# Patient Record
Sex: FEMALE | Race: WHITE | Employment: FULL TIME | URBAN - NONMETROPOLITAN AREA
[De-identification: names, ages, dates, MRNs, and addresses within clinical notes are randomized per-mention and may not be internally consistent; named-entity substitution may affect disease eponyms.]

---

## 2017-01-20 PROBLEM — O41.00X0 OLIGOHYDRAMNIOS: Status: ACTIVE | Noted: 2017-01-20

## 2018-05-17 ENCOUNTER — NURSE TRIAGE (OUTPATIENT)
Dept: ADMINISTRATIVE | Age: 23
End: 2018-05-17

## 2018-05-19 ENCOUNTER — HOSPITAL ENCOUNTER (EMERGENCY)
Age: 23
Discharge: HOME | End: 2018-05-19
Attending: EMERGENCY MEDICINE
Payer: COMMERCIAL

## 2018-05-19 VITALS
OXYGEN SATURATION: 96 % | RESPIRATION RATE: 21 BRPM | HEIGHT: 61 IN | BODY MASS INDEX: 28.51 KG/M2 | TEMPERATURE: 99.1 F | DIASTOLIC BLOOD PRESSURE: 53 MMHG | HEART RATE: 114 BPM | SYSTOLIC BLOOD PRESSURE: 93 MMHG | WEIGHT: 151 LBS

## 2018-05-19 DIAGNOSIS — R65.10 SIRS (SYSTEMIC INFLAMMATORY RESPONSE SYNDROME) (HCC): ICD-10-CM

## 2018-05-19 DIAGNOSIS — J02.9 ACUTE PHARYNGITIS, UNSPECIFIED ETIOLOGY: Primary | ICD-10-CM

## 2018-05-19 LAB
ANION GAP SERPL CALCULATED.3IONS-SCNC: 13 MEQ/L (ref 8–16)
BACTERIA: ABNORMAL /HPF
BASOPHILS # BLD: 0.1 %
BASOPHILS ABSOLUTE: 0 THOU/MM3 (ref 0–0.1)
BILIRUBIN URINE: NEGATIVE
BLOOD, URINE: ABNORMAL
BUN BLDV-MCNC: 12 MG/DL (ref 7–22)
CALCIUM SERPL-MCNC: 8.4 MG/DL (ref 8.5–10.5)
CASTS 2: ABNORMAL /LPF
CASTS UA: ABNORMAL /LPF
CHARACTER, URINE: CLEAR
CHLORIDE BLD-SCNC: 98 MEQ/L (ref 98–111)
CO2: 24 MEQ/L (ref 23–33)
COLOR: YELLOW
CREAT SERPL-MCNC: 0.9 MG/DL (ref 0.4–1.2)
CRYSTALS, UA: ABNORMAL
EOSINOPHIL # BLD: 0 %
EOSINOPHILS ABSOLUTE: 0 THOU/MM3 (ref 0–0.4)
EPITHELIAL CELLS, UA: ABNORMAL /HPF
FLU A ANTIGEN: NEGATIVE
FLU B ANTIGEN: NEGATIVE
GFR SERPL CREATININE-BSD FRML MDRD: 79 ML/MIN/1.73M2
GLUCOSE BLD-MCNC: 105 MG/DL (ref 70–108)
GLUCOSE URINE: NEGATIVE MG/DL
GROUP A STREP CULTURE, REFLEX: NEGATIVE
HCT VFR BLD CALC: 38.5 % (ref 37–47)
HEMOGLOBIN: 13.4 GM/DL (ref 12–16)
HETEROPHILE ANTIBODIES: NEGATIVE
KETONES, URINE: NEGATIVE
LEUKOCYTE ESTERASE, URINE: NEGATIVE
LYMPHOCYTES # BLD: 17.1 %
LYMPHOCYTES ABSOLUTE: 1.8 THOU/MM3 (ref 1–4.8)
MCH RBC QN AUTO: 30.9 PG (ref 27–31)
MCHC RBC AUTO-ENTMCNC: 34.8 GM/DL (ref 33–37)
MCV RBC AUTO: 88.7 FL (ref 81–99)
MISCELLANEOUS 2: ABNORMAL
MONOCYTES # BLD: 8.8 %
MONOCYTES ABSOLUTE: 0.9 THOU/MM3 (ref 0.4–1.3)
NITRITE, URINE: NEGATIVE
NUCLEATED RED BLOOD CELLS: 0 /100 WBC
OSMOLALITY CALCULATION: 270.2 MOSMOL/KG (ref 275–300)
PDW BLD-RTO: 13.1 % (ref 11.5–14.5)
PH UA: 5
PLATELET # BLD: 213 THOU/MM3 (ref 130–400)
PMV BLD AUTO: 8.6 FL (ref 7.4–10.4)
POTASSIUM SERPL-SCNC: 3.6 MEQ/L (ref 3.5–5.2)
PREGNANCY, SERUM: NEGATIVE
PROTEIN UA: NEGATIVE
RBC # BLD: 4.33 MILL/MM3 (ref 4.2–5.4)
RBC URINE: ABNORMAL /HPF
REFLEX THROAT C + S: NORMAL
RENAL EPITHELIAL, UA: ABNORMAL
SEG NEUTROPHILS: 74 %
SEGMENTED NEUTROPHILS ABSOLUTE COUNT: 7.9 THOU/MM3 (ref 1.8–7.7)
SODIUM BLD-SCNC: 135 MEQ/L (ref 135–145)
SPECIFIC GRAVITY, URINE: 1.02 (ref 1–1.03)
UROBILINOGEN, URINE: 0.2 EU/DL
WBC # BLD: 10.7 THOU/MM3 (ref 4.8–10.8)
WBC UA: ABNORMAL /HPF
YEAST: ABNORMAL

## 2018-05-19 PROCEDURE — 96365 THER/PROPH/DIAG IV INF INIT: CPT

## 2018-05-19 PROCEDURE — 36415 COLL VENOUS BLD VENIPUNCTURE: CPT

## 2018-05-19 PROCEDURE — 87070 CULTURE OTHR SPECIMN AEROBIC: CPT

## 2018-05-19 PROCEDURE — 86308 HETEROPHILE ANTIBODY SCREEN: CPT

## 2018-05-19 PROCEDURE — 87880 STREP A ASSAY W/OPTIC: CPT

## 2018-05-19 PROCEDURE — 85025 COMPLETE CBC W/AUTO DIFF WBC: CPT

## 2018-05-19 PROCEDURE — 2580000003 HC RX 258: Performed by: EMERGENCY MEDICINE

## 2018-05-19 PROCEDURE — 99283 EMERGENCY DEPT VISIT LOW MDM: CPT

## 2018-05-19 PROCEDURE — 87040 BLOOD CULTURE FOR BACTERIA: CPT

## 2018-05-19 PROCEDURE — 84703 CHORIONIC GONADOTROPIN ASSAY: CPT

## 2018-05-19 PROCEDURE — 81001 URINALYSIS AUTO W/SCOPE: CPT

## 2018-05-19 PROCEDURE — 80048 BASIC METABOLIC PNL TOTAL CA: CPT

## 2018-05-19 PROCEDURE — 87804 INFLUENZA ASSAY W/OPTIC: CPT

## 2018-05-19 PROCEDURE — 6360000002 HC RX W HCPCS: Performed by: EMERGENCY MEDICINE

## 2018-05-19 PROCEDURE — 6370000000 HC RX 637 (ALT 250 FOR IP): Performed by: EMERGENCY MEDICINE

## 2018-05-19 RX ORDER — 0.9 % SODIUM CHLORIDE 0.9 %
1000 INTRAVENOUS SOLUTION INTRAVENOUS ONCE
Status: COMPLETED | OUTPATIENT
Start: 2018-05-19 | End: 2018-05-19

## 2018-05-19 RX ORDER — ACETAMINOPHEN 500 MG
1000 TABLET ORAL ONCE
Status: COMPLETED | OUTPATIENT
Start: 2018-05-19 | End: 2018-05-19

## 2018-05-19 RX ORDER — 0.9 % SODIUM CHLORIDE 0.9 %
500 INTRAVENOUS SOLUTION INTRAVENOUS ONCE
Status: DISCONTINUED | OUTPATIENT
Start: 2018-05-19 | End: 2018-05-19 | Stop reason: HOSPADM

## 2018-05-19 RX ORDER — IBUPROFEN 800 MG/1
800 TABLET ORAL ONCE
Status: COMPLETED | OUTPATIENT
Start: 2018-05-19 | End: 2018-05-19

## 2018-05-19 RX ADMIN — ACETAMINOPHEN 1000 MG: 500 TABLET ORAL at 04:24

## 2018-05-19 RX ADMIN — IBUPROFEN 800 MG: 800 TABLET ORAL at 05:24

## 2018-05-19 RX ADMIN — SODIUM CHLORIDE 1000 ML: 9 INJECTION, SOLUTION INTRAVENOUS at 04:24

## 2018-05-19 RX ADMIN — CEFTRIAXONE SODIUM 1 G: 1 INJECTION, POWDER, FOR SOLUTION INTRAMUSCULAR; INTRAVENOUS at 04:24

## 2018-05-19 ASSESSMENT — ENCOUNTER SYMPTOMS
SHORTNESS OF BREATH: 0
RHINORRHEA: 0
EYE PAIN: 0
ABDOMINAL PAIN: 0
NAUSEA: 0
WHEEZING: 0
DIARRHEA: 0
EYE DISCHARGE: 0
BACK PAIN: 0
COUGH: 1
VOMITING: 0
SORE THROAT: 1

## 2018-05-19 ASSESSMENT — PAIN DESCRIPTION - LOCATION
LOCATION: THROAT
LOCATION: THROAT

## 2018-05-19 ASSESSMENT — PAIN SCALES - GENERAL
PAINLEVEL_OUTOF10: 9
PAINLEVEL_OUTOF10: 9

## 2018-05-19 ASSESSMENT — PAIN DESCRIPTION - PAIN TYPE
TYPE: ACUTE PAIN
TYPE: ACUTE PAIN

## 2018-05-19 ASSESSMENT — PAIN DESCRIPTION - FREQUENCY
FREQUENCY: CONTINUOUS
FREQUENCY: CONTINUOUS

## 2018-05-19 ASSESSMENT — PAIN DESCRIPTION - DESCRIPTORS: DESCRIPTORS: BURNING

## 2018-05-21 LAB — THROAT/NOSE CULTURE: NORMAL

## 2018-05-24 LAB
BLOOD CULTURE, ROUTINE: NORMAL
BLOOD CULTURE, ROUTINE: NORMAL

## 2018-08-21 ENCOUNTER — HOSPITAL ENCOUNTER (EMERGENCY)
Age: 23
Discharge: HOME | End: 2018-08-21
Attending: EMERGENCY MEDICINE
Payer: COMMERCIAL

## 2018-08-21 ENCOUNTER — APPOINTMENT (OUTPATIENT)
Dept: CT IMAGING | Age: 23
End: 2018-08-21
Payer: COMMERCIAL

## 2018-08-21 VITALS
BODY MASS INDEX: 28.51 KG/M2 | OXYGEN SATURATION: 97 % | TEMPERATURE: 99.2 F | RESPIRATION RATE: 18 BRPM | SYSTOLIC BLOOD PRESSURE: 107 MMHG | HEART RATE: 80 BPM | WEIGHT: 151 LBS | DIASTOLIC BLOOD PRESSURE: 71 MMHG | HEIGHT: 61 IN

## 2018-08-21 DIAGNOSIS — N30.01 ACUTE CYSTITIS WITH HEMATURIA: Primary | ICD-10-CM

## 2018-08-21 LAB
ANION GAP SERPL CALCULATED.3IONS-SCNC: 12 MEQ/L (ref 8–16)
BACTERIA: ABNORMAL /HPF
BASOPHILS # BLD: 0.4 %
BASOPHILS ABSOLUTE: 0 THOU/MM3 (ref 0–0.1)
BILIRUBIN URINE: NEGATIVE
BLOOD, URINE: ABNORMAL
BUN BLDV-MCNC: 12 MG/DL (ref 7–22)
CALCIUM SERPL-MCNC: 9.3 MG/DL (ref 8.5–10.5)
CASTS 2: ABNORMAL /LPF
CASTS UA: ABNORMAL /LPF
CHARACTER, URINE: ABNORMAL
CHLORIDE BLD-SCNC: 102 MEQ/L (ref 98–111)
CO2: 24 MEQ/L (ref 23–33)
COLOR: YELLOW
CREAT SERPL-MCNC: 0.8 MG/DL (ref 0.4–1.2)
CRYSTALS, UA: ABNORMAL
EOSINOPHIL # BLD: 0.9 %
EOSINOPHILS ABSOLUTE: 0.1 THOU/MM3 (ref 0–0.4)
EPITHELIAL CELLS, UA: ABNORMAL /HPF
ERYTHROCYTE [DISTWIDTH] IN BLOOD BY AUTOMATED COUNT: 13.2 % (ref 11.5–14.5)
ERYTHROCYTE [DISTWIDTH] IN BLOOD BY AUTOMATED COUNT: 43 FL (ref 35–45)
GFR SERPL CREATININE-BSD FRML MDRD: > 90 ML/MIN/1.73M2
GLUCOSE BLD-MCNC: 95 MG/DL (ref 70–108)
GLUCOSE URINE: NEGATIVE MG/DL
HCT VFR BLD CALC: 41.6 % (ref 37–47)
HEMOGLOBIN: 14.4 GM/DL (ref 12–16)
IMMATURE GRANS (ABS): 0.06 THOU/MM3 (ref 0–0.07)
IMMATURE GRANULOCYTES: 0.5 %
KETONES, URINE: NEGATIVE
LEUKOCYTE ESTERASE, URINE: ABNORMAL
LYMPHOCYTES # BLD: 25.7 %
LYMPHOCYTES ABSOLUTE: 2.9 THOU/MM3 (ref 1–4.8)
MCH RBC QN AUTO: 31.2 PG (ref 26–33)
MCHC RBC AUTO-ENTMCNC: 34.6 GM/DL (ref 32.2–35.5)
MCV RBC AUTO: 90 FL (ref 81–99)
MISCELLANEOUS 2: ABNORMAL
MONOCYTES # BLD: 6.2 %
MONOCYTES ABSOLUTE: 0.7 THOU/MM3 (ref 0.4–1.3)
NITRITE, URINE: POSITIVE
NUCLEATED RED BLOOD CELLS: 0 /100 WBC
OSMOLALITY CALCULATION: 275.2 MOSMOL/KG (ref 275–300)
PH UA: 5.5
PLATELET # BLD: 278 THOU/MM3 (ref 130–400)
PMV BLD AUTO: 9.6 FL (ref 9.4–12.4)
POTASSIUM SERPL-SCNC: 3.9 MEQ/L (ref 3.5–5.2)
PREGNANCY, SERUM: NEGATIVE
PREGNANCY, URINE: NEGATIVE
PROTEIN UA: 30
RBC # BLD: 4.62 MILL/MM3 (ref 4.2–5.4)
RBC URINE: ABNORMAL /HPF
RENAL EPITHELIAL, UA: ABNORMAL
SEG NEUTROPHILS: 66.3 %
SEGMENTED NEUTROPHILS ABSOLUTE COUNT: 7.6 THOU/MM3 (ref 1.8–7.7)
SODIUM BLD-SCNC: 138 MEQ/L (ref 135–145)
SPECIFIC GRAVITY, URINE: 1.02 (ref 1–1.03)
UROBILINOGEN, URINE: 0.2 EU/DL
WBC # BLD: 11.4 THOU/MM3 (ref 4.8–10.8)
WBC UA: ABNORMAL /HPF
YEAST: ABNORMAL

## 2018-08-21 PROCEDURE — 81001 URINALYSIS AUTO W/SCOPE: CPT

## 2018-08-21 PROCEDURE — 87186 SC STD MICRODIL/AGAR DIL: CPT

## 2018-08-21 PROCEDURE — 87184 SC STD DISK METHOD PER PLATE: CPT

## 2018-08-21 PROCEDURE — 74176 CT ABD & PELVIS W/O CONTRAST: CPT

## 2018-08-21 PROCEDURE — 84703 CHORIONIC GONADOTROPIN ASSAY: CPT

## 2018-08-21 PROCEDURE — 36415 COLL VENOUS BLD VENIPUNCTURE: CPT

## 2018-08-21 PROCEDURE — 99284 EMERGENCY DEPT VISIT MOD MDM: CPT

## 2018-08-21 PROCEDURE — 85025 COMPLETE CBC W/AUTO DIFF WBC: CPT

## 2018-08-21 PROCEDURE — 87086 URINE CULTURE/COLONY COUNT: CPT

## 2018-08-21 PROCEDURE — 87077 CULTURE AEROBIC IDENTIFY: CPT

## 2018-08-21 PROCEDURE — 81025 URINE PREGNANCY TEST: CPT

## 2018-08-21 PROCEDURE — 80048 BASIC METABOLIC PNL TOTAL CA: CPT

## 2018-08-21 RX ORDER — SULFAMETHOXAZOLE AND TRIMETHOPRIM 800; 160 MG/1; MG/1
1 TABLET ORAL 2 TIMES DAILY
Qty: 6 TABLET | Refills: 0 | Status: SHIPPED | OUTPATIENT
Start: 2018-08-21 | End: 2018-08-24

## 2018-08-21 ASSESSMENT — ENCOUNTER SYMPTOMS
BACK PAIN: 0
NAUSEA: 0
COUGH: 0
WHEEZING: 0
ABDOMINAL PAIN: 0
RHINORRHEA: 0
VOMITING: 0
EYE DISCHARGE: 0
SORE THROAT: 0
DIARRHEA: 0
SHORTNESS OF BREATH: 0
EYE PAIN: 0

## 2018-08-21 ASSESSMENT — PAIN DESCRIPTION - ORIENTATION: ORIENTATION: RIGHT;LOWER

## 2018-08-21 ASSESSMENT — PAIN DESCRIPTION - PAIN TYPE: TYPE: ACUTE PAIN

## 2018-08-21 ASSESSMENT — PAIN DESCRIPTION - LOCATION: LOCATION: FLANK;BACK

## 2018-08-21 ASSESSMENT — PAIN SCALES - GENERAL: PAINLEVEL_OUTOF10: 8

## 2018-08-22 NOTE — ED PROVIDER NOTES
[facility]  eMERGENCY dEPARTMENT eNCOUnter          [address] [facility]       Chief Complaint   Patient presents with    Back Pain    Flank Pain       Nurses Notes reviewed and I agree except as noted in the HPI. HISTORY OF PRESENT Astrid Hope is a 26 y.o. female who presents to the Emergency Department with a medical history of nephrolithiasis for the evalution for the evaluation of right sided flank pain. She states that her pain became present at 1000 this morning and reports that it became increasingly exacerbated throughout the day. She rates her current pain as a 8/10 in severity. She states that her pain tracks towards the right side of her abdomen. No additional symptoms or complaints at this time. The HPI was provided by the patient. REVIEW OF SYSTEMS     Review of Systems   Constitutional: Negative for appetite change, chills, fatigue and fever. HENT: Negative for congestion, ear pain, rhinorrhea and sore throat. Eyes: Negative for pain, discharge and visual disturbance. Respiratory: Negative for cough, shortness of breath and wheezing. Cardiovascular: Negative for chest pain, palpitations and leg swelling. Gastrointestinal: Negative for abdominal pain, diarrhea, nausea and vomiting. Genitourinary: Positive for flank pain (right). Negative for difficulty urinating, dysuria, hematuria and vaginal discharge. Musculoskeletal: Negative for arthralgias, back pain, joint swelling and neck pain. Skin: Negative for pallor and rash. Neurological: Negative for dizziness, syncope, weakness, light-headedness, numbness and headaches. Hematological: Negative for adenopathy. Psychiatric/Behavioral: Negative for confusion and suicidal ideas. The patient is not nervous/anxious. PAST MEDICAL HISTORY    has a past medical history of Kidney stone.     SURGICAL HISTORY      has a past surgical history that includes Tympanostomy tube placement; other following:     Blood, Urine LARGE (*)     Protein, UA 30 (*)     Nitrite, Urine POSITIVE (*)     Leukocyte Esterase, Urine LARGE (*)     Character, Urine CLOUDY (*)     All other components within normal limits   BASIC METABOLIC PANEL   HCG, SERUM, QUALITATIVE   PREGNANCY, URINE   ANION GAP   OSMOLALITY   GLOMERULAR FILTRATION RATE, ESTIMATED       EMERGENCY DEPARTMENT COURSE:   Vitals:    Vitals:    08/21/18 1936 08/21/18 2118   BP: 128/71 107/71   Pulse: 82 80   Resp: 25 18   Temp: 99.2 °F (37.3 °C)    TempSrc: Oral    SpO2: 100% 97%   Weight: 151 lb (68.5 kg)    Height: 5' 1\" (1.549 m)        8:33 PM: The patient was seen and evaluated. Labs and imaging were ordered and completed. MDM:  Patient presented with complaint of UTI like symptoms, she has history of kidney stones. CAT scan of the abdomen shows no kidney stones. Otherwise she is nontoxic, patient discharged home with antibiotics. CRITICAL CARE:   None     CONSULTS:  None    PROCEDURES:  None     FINAL IMPRESSION      1. Acute cystitis with hematuria          DISPOSITION/PLAN       PATIENT REFERRED TO:  Luda Fang MD  [address] [facility]  [phone]    In 3 days  RE-CHECK AND FURTHER TESTING AS NEEDED      DISCHARGE MEDICATIONS:  Discharge Medication List as of 8/21/2018  9:18 PM      START taking these medications    Details   sulfamethoxazole-trimethoprim (BACTRIM DS) 800-160 MG per tablet Take 1 tablet by mouth 2 times daily for 3 days, Disp-6 tablet, R-0Print             (Please note that portions of this note were completed with a voice recognition program.  Efforts were made to edit the dictations but occasionally words are mis-transcribed.)    Scribe:  Tuan Black 8/21/18 8:33 PM Scribing for and in the presence of Taty Diane DO.     Signed by: Duane Cristina, 08/23/18 9:57 PM    Provider:  I personally performed the services described in the documentation, reviewed and edited the documentation which was dictated to the scribe in my presence, and it accurately records my words and actions.     Kj Wooten,  8/21/18 9:57 PM        Kj Wooten DO  08/23/18 9915

## 2018-08-23 LAB
ORGANISM: ABNORMAL
URINE CULTURE REFLEX: ABNORMAL

## 2018-10-31 ENCOUNTER — HOSPITAL ENCOUNTER (EMERGENCY)
Age: 23
Discharge: HOME | DRG: 690 | End: 2018-10-31
Payer: COMMERCIAL

## 2018-10-31 ENCOUNTER — APPOINTMENT (OUTPATIENT)
Dept: GENERAL RADIOLOGY | Age: 23
DRG: 690 | End: 2018-10-31
Payer: COMMERCIAL

## 2018-10-31 VITALS
DIASTOLIC BLOOD PRESSURE: 64 MMHG | RESPIRATION RATE: 16 BRPM | OXYGEN SATURATION: 99 % | SYSTOLIC BLOOD PRESSURE: 102 MMHG | TEMPERATURE: 98.9 F | HEART RATE: 106 BPM

## 2018-10-31 DIAGNOSIS — N39.0 URINARY TRACT INFECTION WITH HEMATURIA, SITE UNSPECIFIED: Primary | ICD-10-CM

## 2018-10-31 DIAGNOSIS — R31.9 URINARY TRACT INFECTION WITH HEMATURIA, SITE UNSPECIFIED: Primary | ICD-10-CM

## 2018-10-31 LAB
ANION GAP SERPL CALCULATED.3IONS-SCNC: 14 MEQ/L (ref 8–16)
BACTERIA: ABNORMAL /HPF
BASOPHILS # BLD: 0.2 %
BASOPHILS ABSOLUTE: 0 THOU/MM3 (ref 0–0.1)
BILIRUBIN URINE: NEGATIVE
BLOOD, URINE: ABNORMAL
BUN BLDV-MCNC: 9 MG/DL (ref 7–22)
CALCIUM SERPL-MCNC: 8.8 MG/DL (ref 8.5–10.5)
CASTS 2: ABNORMAL /LPF
CASTS UA: ABNORMAL /LPF
CHARACTER, URINE: ABNORMAL
CHLORIDE BLD-SCNC: 102 MEQ/L (ref 98–111)
CO2: 19 MEQ/L (ref 23–33)
COLOR: YELLOW
CREAT SERPL-MCNC: 0.8 MG/DL (ref 0.4–1.2)
CRYSTALS, UA: ABNORMAL
EOSINOPHIL # BLD: 0 %
EOSINOPHILS ABSOLUTE: 0 THOU/MM3 (ref 0–0.4)
EPITHELIAL CELLS, UA: ABNORMAL /HPF
ERYTHROCYTE [DISTWIDTH] IN BLOOD BY AUTOMATED COUNT: 12.7 % (ref 11.5–14.5)
ERYTHROCYTE [DISTWIDTH] IN BLOOD BY AUTOMATED COUNT: 42.5 FL (ref 35–45)
FLU A ANTIGEN: NEGATIVE
FLU B ANTIGEN: NEGATIVE
GFR SERPL CREATININE-BSD FRML MDRD: > 90 ML/MIN/1.73M2
GLUCOSE BLD-MCNC: 128 MG/DL (ref 70–108)
GLUCOSE URINE: NEGATIVE MG/DL
GROUP A STREP CULTURE, REFLEX: NEGATIVE
HCT VFR BLD CALC: 41.2 % (ref 37–47)
HEMOGLOBIN: 13.8 GM/DL (ref 12–16)
IMMATURE GRANS (ABS): 0.08 THOU/MM3 (ref 0–0.07)
IMMATURE GRANULOCYTES: 0.5 %
KETONES, URINE: NEGATIVE
LACTIC ACID: 2 MMOL/L (ref 0.5–2.2)
LEUKOCYTE ESTERASE, URINE: ABNORMAL
LYMPHOCYTES # BLD: 7.9 %
LYMPHOCYTES ABSOLUTE: 1.2 THOU/MM3 (ref 1–4.8)
MCH RBC QN AUTO: 30.7 PG (ref 26–33)
MCHC RBC AUTO-ENTMCNC: 33.5 GM/DL (ref 32.2–35.5)
MCV RBC AUTO: 91.8 FL (ref 81–99)
MISCELLANEOUS 2: ABNORMAL
MONOCYTES # BLD: 8 %
MONOCYTES ABSOLUTE: 1.3 THOU/MM3 (ref 0.4–1.3)
MUCUS: ABNORMAL
NITRITE, URINE: POSITIVE
NUCLEATED RED BLOOD CELLS: 0 /100 WBC
OSMOLALITY CALCULATION: 270.4 MOSMOL/KG (ref 275–300)
PH UA: 6
PLATELET # BLD: 231 THOU/MM3 (ref 130–400)
PLATELET ESTIMATE: ABNORMAL
PMV BLD AUTO: 9.8 FL (ref 9.4–12.4)
POTASSIUM SERPL-SCNC: 3.8 MEQ/L (ref 3.5–5.2)
PREGNANCY, SERUM: NEGATIVE
PROCALCITONIN: 0.4 NG/ML (ref 0.01–0.09)
PROTEIN UA: 30
RBC # BLD: 4.49 MILL/MM3 (ref 4.2–5.4)
RBC URINE: ABNORMAL /HPF
REFLEX THROAT C + S: NORMAL
RENAL EPITHELIAL, UA: ABNORMAL
SCAN OF BLOOD SMEAR: NORMAL
SEG NEUTROPHILS: 83.4 %
SEGMENTED NEUTROPHILS ABSOLUTE COUNT: 13.1 THOU/MM3 (ref 1.8–7.7)
SODIUM BLD-SCNC: 135 MEQ/L (ref 135–145)
SPECIFIC GRAVITY, URINE: 1.01 (ref 1–1.03)
UROBILINOGEN, URINE: 0.2 EU/DL
WBC # BLD: 15.7 THOU/MM3 (ref 4.8–10.8)
WBC UA: > 200 /HPF
YEAST: ABNORMAL

## 2018-10-31 PROCEDURE — 6360000002 HC RX W HCPCS: Performed by: EMERGENCY MEDICINE

## 2018-10-31 PROCEDURE — 87070 CULTURE OTHR SPECIMN AEROBIC: CPT

## 2018-10-31 PROCEDURE — 87040 BLOOD CULTURE FOR BACTERIA: CPT

## 2018-10-31 PROCEDURE — 2580000003 HC RX 258: Performed by: EMERGENCY MEDICINE

## 2018-10-31 PROCEDURE — 6370000000 HC RX 637 (ALT 250 FOR IP): Performed by: PHYSICIAN ASSISTANT

## 2018-10-31 PROCEDURE — 87184 SC STD DISK METHOD PER PLATE: CPT

## 2018-10-31 PROCEDURE — 96365 THER/PROPH/DIAG IV INF INIT: CPT

## 2018-10-31 PROCEDURE — 84145 PROCALCITONIN (PCT): CPT

## 2018-10-31 PROCEDURE — 71046 X-RAY EXAM CHEST 2 VIEWS: CPT

## 2018-10-31 PROCEDURE — 2580000003 HC RX 258: Performed by: PHYSICIAN ASSISTANT

## 2018-10-31 PROCEDURE — 87077 CULTURE AEROBIC IDENTIFY: CPT

## 2018-10-31 PROCEDURE — 87186 SC STD MICRODIL/AGAR DIL: CPT

## 2018-10-31 PROCEDURE — 84703 CHORIONIC GONADOTROPIN ASSAY: CPT

## 2018-10-31 PROCEDURE — 83605 ASSAY OF LACTIC ACID: CPT

## 2018-10-31 PROCEDURE — 87086 URINE CULTURE/COLONY COUNT: CPT

## 2018-10-31 PROCEDURE — 99283 EMERGENCY DEPT VISIT LOW MDM: CPT

## 2018-10-31 PROCEDURE — 80048 BASIC METABOLIC PNL TOTAL CA: CPT

## 2018-10-31 PROCEDURE — 85025 COMPLETE CBC W/AUTO DIFF WBC: CPT

## 2018-10-31 PROCEDURE — 87880 STREP A ASSAY W/OPTIC: CPT

## 2018-10-31 PROCEDURE — 36415 COLL VENOUS BLD VENIPUNCTURE: CPT

## 2018-10-31 PROCEDURE — 87804 INFLUENZA ASSAY W/OPTIC: CPT

## 2018-10-31 PROCEDURE — 81001 URINALYSIS AUTO W/SCOPE: CPT

## 2018-10-31 RX ORDER — 0.9 % SODIUM CHLORIDE 0.9 %
1000 INTRAVENOUS SOLUTION INTRAVENOUS ONCE
Status: COMPLETED | OUTPATIENT
Start: 2018-10-31 | End: 2018-10-31

## 2018-10-31 RX ORDER — ACETAMINOPHEN 500 MG
1000 TABLET ORAL ONCE
Status: COMPLETED | OUTPATIENT
Start: 2018-10-31 | End: 2018-10-31

## 2018-10-31 RX ORDER — SULFAMETHOXAZOLE AND TRIMETHOPRIM 800; 160 MG/1; MG/1
1 TABLET ORAL 2 TIMES DAILY
Qty: 14 TABLET | Refills: 0 | Status: ON HOLD | OUTPATIENT
Start: 2018-10-31 | End: 2018-11-02

## 2018-10-31 RX ADMIN — ACETAMINOPHEN 1000 MG: 500 TABLET, FILM COATED ORAL at 12:14

## 2018-10-31 RX ADMIN — SODIUM CHLORIDE 1000 ML: 9 INJECTION, SOLUTION INTRAVENOUS at 12:48

## 2018-10-31 RX ADMIN — CEFTRIAXONE SODIUM 1 G: 1 INJECTION, POWDER, FOR SOLUTION INTRAMUSCULAR; INTRAVENOUS at 13:15

## 2018-10-31 ASSESSMENT — ENCOUNTER SYMPTOMS
VOMITING: 0
FACIAL SWELLING: 0
SINUS PAIN: 0
SINUS PRESSURE: 0
DIARRHEA: 0
ABDOMINAL PAIN: 0
COLOR CHANGE: 0
NAUSEA: 0
TROUBLE SWALLOWING: 0
RHINORRHEA: 0
SHORTNESS OF BREATH: 0
COUGH: 0
SORE THROAT: 0

## 2018-10-31 ASSESSMENT — PAIN DESCRIPTION - FREQUENCY: FREQUENCY: CONTINUOUS

## 2018-10-31 ASSESSMENT — PAIN DESCRIPTION - LOCATION: LOCATION: HEAD

## 2018-10-31 ASSESSMENT — PAIN SCALES - GENERAL
PAINLEVEL_OUTOF10: 5
PAINLEVEL_OUTOF10: 8
PAINLEVEL_OUTOF10: 8

## 2018-10-31 ASSESSMENT — PAIN DESCRIPTION - PAIN TYPE: TYPE: ACUTE PAIN

## 2018-10-31 NOTE — ED PROVIDER NOTES
[facility]  eMERGENCY dEPARTMENT eNCOUnter          CHIEF COMPLAINT       Chief Complaint   Patient presents with    Generalized Body Aches       Nurses Notes reviewed and I agree except as noted in the HPI. HISTORY OF PRESENT Lionel Yates is a 26 y.o. female who presents to the Emergency Department for the evaluation of Fever, body aches, chills. Patient reports that her symptoms began yesterday morning and have persisted. She states that having gotten any worse or better. She states she feels that she cannot get warm. She denies being around anybody else sick. She denies sore throat, she denies cough or shortness of breath. She denies urinary symptoms. She does voice some minor right flank pain when asked, but states has not been severe. She denies voiding more frequently, denies urgency, dysuria. The HPI was provided by the patient. REVIEW OF SYSTEMS     Review of Systems   Constitutional: Positive for appetite change, chills, fatigue and fever. Negative for activity change and diaphoresis. HENT: Negative for facial swelling, rhinorrhea, sinus pain, sinus pressure, sore throat and trouble swallowing. Respiratory: Negative for cough and shortness of breath. Cardiovascular: Negative for chest pain and leg swelling. Gastrointestinal: Negative for abdominal pain, diarrhea, nausea and vomiting. Genitourinary: Positive for flank pain (right, mild). Negative for difficulty urinating, dyspareunia, dysuria and frequency. Musculoskeletal: Negative for arthralgias. Skin: Negative for color change and pallor. Neurological: Negative for dizziness. Psychiatric/Behavioral: Negative for behavioral problems. PAST MEDICAL HISTORY    has a past medical history of Kidney stone.     SURGICAL HISTORY      has a past surgical history that includes Tympanostomy tube placement; other surgical history (16 April 2015); and Dilation and curettage of uterus (12/21/15). CURRENT MEDICATIONS       Discharge Medication List as of 10/31/2018  3:50 PM          ALLERGIES     has No Known Allergies. FAMILY HISTORY     indicated that her mother is alive. She indicated that her father is alive. She indicated that the status of her paternal grandfather is unknown.    family history includes Heart Disease in her father and paternal grandfather. SOCIAL HISTORY      reports that she has never smoked. She has never used smokeless tobacco. She reports that she does not drink alcohol or use drugs. PHYSICAL EXAM     INITIAL VITALS:  oral temperature is 98.9 °F (37.2 °C). Her blood pressure is 102/64 and her pulse is 106. Her respiration is 16 and oxygen saturation is 99%. Physical Exam   Constitutional: She is oriented to person, place, and time. She appears well-developed and well-nourished. No distress. HENT:   Head: Normocephalic. Right Ear: Tympanic membrane, external ear and ear canal normal.   Left Ear: Tympanic membrane and external ear normal.   Nose: Nose normal.   Mouth/Throat: Uvula is midline, oropharynx is clear and moist and mucous membranes are normal. Tonsils are 0 on the right. Tonsils are 0 on the left. Eyes: Pupils are equal, round, and reactive to light. EOM are normal. Right eye exhibits no discharge. Left eye exhibits no discharge. Neck: Normal range of motion. Cardiovascular: Regular rhythm. Tachycardia present. Pulmonary/Chest: Effort normal and breath sounds normal. No respiratory distress. She has no wheezes. Abdominal: Soft. Bowel sounds are normal. She exhibits no distension. There is no tenderness. There is CVA tenderness (mild CVA tenderness). There is no guarding, no tenderness at McBurney's point and negative Meng's sign. Neurological: She is alert and oriented to person, place, and time. Skin: Skin is warm and dry. Capillary refill takes less than 2 seconds. Psychiatric: She has a normal mood and affect.  Her behavior is normal. Thought content normal.        DIFFERENTIAL DIAGNOSIS:   UTI, influenza, pneumonia, viral illness, sepsis    DIAGNOSTIC RESULTS     EKG: All EKG's are interpreted by the Emergency Department Physician who either signs or Co-signs this chart in the absence of a cardiologist.    None    RADIOLOGY: non-plainfilm images(s) such as CT, Ultrasound and MRI are read by the radiologist.    XR CHEST STANDARD (2 VW)   Final Result      No acute findings      **This report has been created using voice recognition software. It may contain minor errors which are inherent in voice recognition technology. **      Final report electronically signed by Dr. John Cho on 10/31/2018 12:50 PM          LABS:     Labs Reviewed   CBC WITH AUTO DIFFERENTIAL - Abnormal; Notable for the following:        Result Value    WBC 15.7 (*)     Segs Absolute 13.1 (*)     Immature Grans (Abs) 0.08 (*)     All other components within normal limits   BASIC METABOLIC PANEL - Abnormal; Notable for the following:     CO2 19 (*)     Glucose 128 (*)     All other components within normal limits   PROCALCITONIN - Abnormal; Notable for the following:     Procalcitonin 0.40 (*)     All other components within normal limits   URINE WITH REFLEXED MICRO - Abnormal; Notable for the following:     Blood, Urine MODERATE (*)     Protein, UA 30 (*)     Nitrite, Urine POSITIVE (*)     Leukocyte Esterase, Urine LARGE (*)     Character, Urine TURBID (*)     All other components within normal limits   OSMOLALITY - Abnormal; Notable for the following:     Osmolality Calc 270.4 (*)     All other components within normal limits   RAPID INFLUENZA A/B ANTIGENS   CULTURE BLOOD #1   CULTURE BLOOD #2   URINE CULTURE, REFLEXED   THROAT CULTURE    Narrative:     Source: throat       Site: throat          Current Antibiotics: none   GROUP A STREP, REFLEX   HCG, SERUM, QUALITATIVE   LACTIC ACID, PLASMA   SCAN OF BLOOD SMEAR   GLOMERULAR FILTRATION RATE, ESTIMATED

## 2018-11-01 ENCOUNTER — HOSPITAL ENCOUNTER (INPATIENT)
Age: 23
LOS: 1 days | Discharge: HOME | DRG: 690 | End: 2018-11-02
Attending: EMERGENCY MEDICINE | Admitting: INTERNAL MEDICINE
Payer: COMMERCIAL

## 2018-11-01 ENCOUNTER — NURSE TRIAGE (OUTPATIENT)
Dept: ADMINISTRATIVE | Age: 23
End: 2018-11-01

## 2018-11-01 DIAGNOSIS — N10 ACUTE PYELONEPHRITIS: Primary | ICD-10-CM

## 2018-11-01 LAB
ALBUMIN SERPL-MCNC: 3.2 G/DL (ref 3.5–5.1)
ALP BLD-CCNC: 50 U/L (ref 38–126)
ALT SERPL-CCNC: 11 U/L (ref 11–66)
ANION GAP SERPL CALCULATED.3IONS-SCNC: 13 MEQ/L (ref 8–16)
AST SERPL-CCNC: 14 U/L (ref 5–40)
BACTERIA: ABNORMAL
BASOPHILS # BLD: 0.1 %
BASOPHILS ABSOLUTE: 0 THOU/MM3 (ref 0–0.1)
BILIRUB SERPL-MCNC: 0.4 MG/DL (ref 0.3–1.2)
BILIRUBIN URINE: ABNORMAL
BLOOD, URINE: ABNORMAL
BUN BLDV-MCNC: 9 MG/DL (ref 7–22)
CALCIUM SERPL-MCNC: 8.8 MG/DL (ref 8.5–10.5)
CASTS: ABNORMAL /LPF
CASTS: ABNORMAL /LPF
CHARACTER, URINE: ABNORMAL
CHLORIDE BLD-SCNC: 102 MEQ/L (ref 98–111)
CO2: 20 MEQ/L (ref 23–33)
COLOR: ABNORMAL
CREAT SERPL-MCNC: 0.8 MG/DL (ref 0.4–1.2)
CRYSTALS: ABNORMAL
EOSINOPHIL # BLD: 0 %
EOSINOPHILS ABSOLUTE: 0 THOU/MM3 (ref 0–0.4)
EPITHELIAL CELLS, UA: ABNORMAL /HPF
ERYTHROCYTE [DISTWIDTH] IN BLOOD BY AUTOMATED COUNT: 12.7 % (ref 11.5–14.5)
ERYTHROCYTE [DISTWIDTH] IN BLOOD BY AUTOMATED COUNT: 43.2 FL (ref 35–45)
GFR SERPL CREATININE-BSD FRML MDRD: > 90 ML/MIN/1.73M2
GLUCOSE BLD-MCNC: 106 MG/DL (ref 70–108)
GLUCOSE, URINE: NEGATIVE MG/DL
HCT VFR BLD CALC: 38.1 % (ref 37–47)
HEMOGLOBIN: 12.5 GM/DL (ref 12–16)
ICTOTEST: NEGATIVE
IMMATURE GRANS (ABS): 0.06 THOU/MM3 (ref 0–0.07)
IMMATURE GRANULOCYTES: 0.4 %
KETONES, URINE: 80
LACTIC ACID, SEPSIS: 0.8 MMOL/L (ref 0.5–1.9)
LACTIC ACID, SEPSIS: 1.5 MMOL/L (ref 0.5–1.9)
LEUKOCYTE EST, POC: ABNORMAL
LYMPHOCYTES # BLD: 7.1 %
LYMPHOCYTES ABSOLUTE: 1.1 THOU/MM3 (ref 1–4.8)
MCH RBC QN AUTO: 30.7 PG (ref 26–33)
MCHC RBC AUTO-ENTMCNC: 32.8 GM/DL (ref 32.2–35.5)
MCV RBC AUTO: 93.6 FL (ref 81–99)
MISCELLANEOUS LAB TEST RESULT: ABNORMAL
MONOCYTES # BLD: 9.7 %
MONOCYTES ABSOLUTE: 1.5 THOU/MM3 (ref 0.4–1.3)
NITRITE, URINE: NEGATIVE
NUCLEATED RED BLOOD CELLS: 0 /100 WBC
OSMOLALITY CALCULATION: 269.2 MOSMOL/KG (ref 275–300)
PH UA: 6
PLATELET # BLD: 212 THOU/MM3 (ref 130–400)
PMV BLD AUTO: 9.8 FL (ref 9.4–12.4)
POTASSIUM SERPL-SCNC: 3.9 MEQ/L (ref 3.5–5.2)
PROTEIN UA: 100 MG/DL
RBC # BLD: 4.07 MILL/MM3 (ref 4.2–5.4)
RBC URINE: > 200 /HPF
RENAL EPITHELIAL, UA: ABNORMAL
SEG NEUTROPHILS: 82.7 %
SEGMENTED NEUTROPHILS ABSOLUTE COUNT: 12.6 THOU/MM3 (ref 1.8–7.7)
SODIUM BLD-SCNC: 135 MEQ/L (ref 135–145)
SPECIFIC GRAVITY UA: 1.02 (ref 1–1.03)
TOTAL PROTEIN: 7 G/DL (ref 6.1–8)
UROBILINOGEN, URINE: 0.2 EU/DL
WBC # BLD: 15.2 THOU/MM3 (ref 4.8–10.8)
WBC UA: ABNORMAL /HPF
YEAST: ABNORMAL

## 2018-11-01 PROCEDURE — 6360000002 HC RX W HCPCS: Performed by: EMERGENCY MEDICINE

## 2018-11-01 PROCEDURE — 6370000000 HC RX 637 (ALT 250 FOR IP): Performed by: EMERGENCY MEDICINE

## 2018-11-01 PROCEDURE — 36415 COLL VENOUS BLD VENIPUNCTURE: CPT

## 2018-11-01 PROCEDURE — 99284 EMERGENCY DEPT VISIT MOD MDM: CPT

## 2018-11-01 PROCEDURE — 2709999900 HC NON-CHARGEABLE SUPPLY

## 2018-11-01 PROCEDURE — 96365 THER/PROPH/DIAG IV INF INIT: CPT

## 2018-11-01 PROCEDURE — 6360000002 HC RX W HCPCS: Performed by: INTERNAL MEDICINE

## 2018-11-01 PROCEDURE — 2580000003 HC RX 258: Performed by: EMERGENCY MEDICINE

## 2018-11-01 PROCEDURE — 2580000003 HC RX 258: Performed by: INTERNAL MEDICINE

## 2018-11-01 PROCEDURE — 87040 BLOOD CULTURE FOR BACTERIA: CPT

## 2018-11-01 PROCEDURE — 1200000000 HC SEMI PRIVATE

## 2018-11-01 PROCEDURE — 80053 COMPREHEN METABOLIC PANEL: CPT

## 2018-11-01 PROCEDURE — 85025 COMPLETE CBC W/AUTO DIFF WBC: CPT

## 2018-11-01 PROCEDURE — 81001 URINALYSIS AUTO W/SCOPE: CPT

## 2018-11-01 PROCEDURE — 99222 1ST HOSP IP/OBS MODERATE 55: CPT | Performed by: INTERNAL MEDICINE

## 2018-11-01 PROCEDURE — 87086 URINE CULTURE/COLONY COUNT: CPT

## 2018-11-01 PROCEDURE — 94760 N-INVAS EAR/PLS OXIMETRY 1: CPT

## 2018-11-01 PROCEDURE — 83605 ASSAY OF LACTIC ACID: CPT

## 2018-11-01 RX ORDER — FAMOTIDINE 20 MG/1
20 TABLET, FILM COATED ORAL 2 TIMES DAILY
Status: DISCONTINUED | OUTPATIENT
Start: 2018-11-01 | End: 2018-11-02 | Stop reason: HOSPADM

## 2018-11-01 RX ORDER — SODIUM CHLORIDE 9 MG/ML
INJECTION, SOLUTION INTRAVENOUS CONTINUOUS
Status: DISCONTINUED | OUTPATIENT
Start: 2018-11-01 | End: 2018-11-02 | Stop reason: HOSPADM

## 2018-11-01 RX ORDER — 0.9 % SODIUM CHLORIDE 0.9 %
1000 INTRAVENOUS SOLUTION INTRAVENOUS ONCE
Status: COMPLETED | OUTPATIENT
Start: 2018-11-01 | End: 2018-11-01

## 2018-11-01 RX ORDER — SODIUM CHLORIDE 0.9 % (FLUSH) 0.9 %
10 SYRINGE (ML) INJECTION PRN
Status: DISCONTINUED | OUTPATIENT
Start: 2018-11-01 | End: 2018-11-02 | Stop reason: HOSPADM

## 2018-11-01 RX ORDER — ACETAMINOPHEN 325 MG/1
650 TABLET ORAL EVERY 4 HOURS PRN
Status: DISCONTINUED | OUTPATIENT
Start: 2018-11-01 | End: 2018-11-02 | Stop reason: HOSPADM

## 2018-11-01 RX ORDER — POTASSIUM CHLORIDE 7.45 MG/ML
10 INJECTION INTRAVENOUS PRN
Status: DISCONTINUED | OUTPATIENT
Start: 2018-11-01 | End: 2018-11-02 | Stop reason: HOSPADM

## 2018-11-01 RX ORDER — SODIUM CHLORIDE 0.9 % (FLUSH) 0.9 %
10 SYRINGE (ML) INJECTION EVERY 12 HOURS SCHEDULED
Status: DISCONTINUED | OUTPATIENT
Start: 2018-11-01 | End: 2018-11-02 | Stop reason: HOSPADM

## 2018-11-01 RX ORDER — ONDANSETRON 2 MG/ML
4 INJECTION INTRAMUSCULAR; INTRAVENOUS EVERY 6 HOURS PRN
Status: DISCONTINUED | OUTPATIENT
Start: 2018-11-01 | End: 2018-11-02 | Stop reason: HOSPADM

## 2018-11-01 RX ORDER — POTASSIUM CHLORIDE 20 MEQ/1
40 TABLET, EXTENDED RELEASE ORAL PRN
Status: DISCONTINUED | OUTPATIENT
Start: 2018-11-01 | End: 2018-11-02 | Stop reason: HOSPADM

## 2018-11-01 RX ORDER — ACETAMINOPHEN 500 MG
1000 TABLET ORAL ONCE
Status: COMPLETED | OUTPATIENT
Start: 2018-11-01 | End: 2018-11-01

## 2018-11-01 RX ORDER — POTASSIUM CHLORIDE 20MEQ/15ML
40 LIQUID (ML) ORAL PRN
Status: DISCONTINUED | OUTPATIENT
Start: 2018-11-01 | End: 2018-11-02 | Stop reason: HOSPADM

## 2018-11-01 RX ADMIN — CEFTRIAXONE SODIUM 1 G: 1 INJECTION, POWDER, FOR SOLUTION INTRAMUSCULAR; INTRAVENOUS at 16:26

## 2018-11-01 RX ADMIN — PIPERACILLIN SODIUM,TAZOBACTAM SODIUM 3.38 G: 3; .375 INJECTION, POWDER, FOR SOLUTION INTRAVENOUS at 20:42

## 2018-11-01 RX ADMIN — SODIUM CHLORIDE 1000 ML: 9 INJECTION, SOLUTION INTRAVENOUS at 16:25

## 2018-11-01 RX ADMIN — SODIUM CHLORIDE: 9 INJECTION, SOLUTION INTRAVENOUS at 18:35

## 2018-11-01 RX ADMIN — ACETAMINOPHEN 1000 MG: 500 TABLET, FILM COATED ORAL at 16:26

## 2018-11-01 ASSESSMENT — PAIN SCALES - GENERAL
PAINLEVEL_OUTOF10: 10
PAINLEVEL_OUTOF10: 0
PAINLEVEL_OUTOF10: 10
PAINLEVEL_OUTOF10: 5

## 2018-11-01 ASSESSMENT — ENCOUNTER SYMPTOMS
RESPIRATORY NEGATIVE: 1
EYES NEGATIVE: 1
GASTROINTESTINAL NEGATIVE: 1

## 2018-11-01 ASSESSMENT — PAIN DESCRIPTION - LOCATION
LOCATION: ABDOMEN
LOCATION: ABDOMEN

## 2018-11-01 ASSESSMENT — PAIN DESCRIPTION - PAIN TYPE
TYPE: ACUTE PAIN
TYPE: ACUTE PAIN

## 2018-11-02 ENCOUNTER — TELEPHONE (OUTPATIENT)
Dept: FAMILY MEDICINE CLINIC | Age: 23
End: 2018-11-02

## 2018-11-02 VITALS
SYSTOLIC BLOOD PRESSURE: 91 MMHG | TEMPERATURE: 97.9 F | WEIGHT: 167.2 LBS | HEIGHT: 61 IN | RESPIRATION RATE: 20 BRPM | BODY MASS INDEX: 31.57 KG/M2 | OXYGEN SATURATION: 98 % | HEART RATE: 81 BPM | DIASTOLIC BLOOD PRESSURE: 56 MMHG

## 2018-11-02 LAB
ANION GAP SERPL CALCULATED.3IONS-SCNC: 10 MEQ/L (ref 8–16)
BASOPHILS # BLD: 0.2 %
BASOPHILS ABSOLUTE: 0 THOU/MM3 (ref 0–0.1)
BUN BLDV-MCNC: 7 MG/DL (ref 7–22)
CALCIUM SERPL-MCNC: 8.5 MG/DL (ref 8.5–10.5)
CHLORIDE BLD-SCNC: 107 MEQ/L (ref 98–111)
CO2: 20 MEQ/L (ref 23–33)
CREAT SERPL-MCNC: 0.7 MG/DL (ref 0.4–1.2)
EOSINOPHIL # BLD: 0.5 %
EOSINOPHILS ABSOLUTE: 0.1 THOU/MM3 (ref 0–0.4)
ERYTHROCYTE [DISTWIDTH] IN BLOOD BY AUTOMATED COUNT: 12.8 % (ref 11.5–14.5)
ERYTHROCYTE [DISTWIDTH] IN BLOOD BY AUTOMATED COUNT: 42.7 FL (ref 35–45)
GFR SERPL CREATININE-BSD FRML MDRD: > 90 ML/MIN/1.73M2
GLUCOSE BLD-MCNC: 87 MG/DL (ref 70–108)
HCT VFR BLD CALC: 33.9 % (ref 37–47)
HEMOGLOBIN: 11.2 GM/DL (ref 12–16)
IMMATURE GRANS (ABS): 0.03 THOU/MM3 (ref 0–0.07)
IMMATURE GRANULOCYTES: 0.3 %
LYMPHOCYTES # BLD: 24.2 %
LYMPHOCYTES ABSOLUTE: 2.4 THOU/MM3 (ref 1–4.8)
MCH RBC QN AUTO: 30.4 PG (ref 26–33)
MCHC RBC AUTO-ENTMCNC: 33 GM/DL (ref 32.2–35.5)
MCV RBC AUTO: 91.9 FL (ref 81–99)
MONOCYTES # BLD: 9.6 %
MONOCYTES ABSOLUTE: 1 THOU/MM3 (ref 0.4–1.3)
NUCLEATED RED BLOOD CELLS: 0 /100 WBC
ORGANISM: ABNORMAL
ORGANISM: ABNORMAL
PLATELET # BLD: 200 THOU/MM3 (ref 130–400)
PMV BLD AUTO: 9.9 FL (ref 9.4–12.4)
POTASSIUM REFLEX MAGNESIUM: 3.7 MEQ/L (ref 3.5–5.2)
RBC # BLD: 3.69 MILL/MM3 (ref 4.2–5.4)
SEG NEUTROPHILS: 65.2 %
SEGMENTED NEUTROPHILS ABSOLUTE COUNT: 6.6 THOU/MM3 (ref 1.8–7.7)
SODIUM BLD-SCNC: 137 MEQ/L (ref 135–145)
THROAT/NOSE CULTURE: NORMAL
URINE CULTURE REFLEX: ABNORMAL
URINE CULTURE, ROUTINE: ABNORMAL
URINE CULTURE, ROUTINE: ABNORMAL
WBC # BLD: 10.1 THOU/MM3 (ref 4.8–10.8)

## 2018-11-02 PROCEDURE — 85025 COMPLETE CBC W/AUTO DIFF WBC: CPT

## 2018-11-02 PROCEDURE — 2580000003 HC RX 258: Performed by: INTERNAL MEDICINE

## 2018-11-02 PROCEDURE — 6370000000 HC RX 637 (ALT 250 FOR IP): Performed by: INTERNAL MEDICINE

## 2018-11-02 PROCEDURE — 80048 BASIC METABOLIC PNL TOTAL CA: CPT

## 2018-11-02 PROCEDURE — 2709999900 HC NON-CHARGEABLE SUPPLY

## 2018-11-02 PROCEDURE — 6360000002 HC RX W HCPCS: Performed by: INTERNAL MEDICINE

## 2018-11-02 PROCEDURE — 99238 HOSP IP/OBS DSCHRG MGMT 30/<: CPT | Performed by: INTERNAL MEDICINE

## 2018-11-02 PROCEDURE — 36415 COLL VENOUS BLD VENIPUNCTURE: CPT

## 2018-11-02 RX ORDER — SULFAMETHOXAZOLE AND TRIMETHOPRIM 800; 160 MG/1; MG/1
1 TABLET ORAL EVERY 12 HOURS SCHEDULED
Status: DISCONTINUED | OUTPATIENT
Start: 2018-11-02 | End: 2018-11-02 | Stop reason: HOSPADM

## 2018-11-02 RX ORDER — SULFAMETHOXAZOLE AND TRIMETHOPRIM 800; 160 MG/1; MG/1
1 TABLET ORAL 2 TIMES DAILY
Qty: 14 TABLET | Refills: 0 | Status: SHIPPED | OUTPATIENT
Start: 2018-11-02 | End: 2018-11-16

## 2018-11-02 RX ORDER — GENTAMICIN SULFATE 80 MG/100ML
80 INJECTION, SOLUTION INTRAVENOUS ONCE
Status: COMPLETED | OUTPATIENT
Start: 2018-11-02 | End: 2018-11-02

## 2018-11-02 RX ADMIN — SULFAMETHOXAZOLE AND TRIMETHOPRIM 1 TABLET: 800; 160 TABLET ORAL at 10:56

## 2018-11-02 RX ADMIN — PIPERACILLIN SODIUM,TAZOBACTAM SODIUM 3.38 G: 3; .375 INJECTION, POWDER, FOR SOLUTION INTRAVENOUS at 05:35

## 2018-11-02 RX ADMIN — FAMOTIDINE 20 MG: 20 TABLET ORAL at 08:19

## 2018-11-02 RX ADMIN — GENTAMICIN SULFATE 80 MG: 80 INJECTION, SOLUTION INTRAVENOUS at 10:56

## 2018-11-02 ASSESSMENT — PAIN DESCRIPTION - PROGRESSION
CLINICAL_PROGRESSION: NOT CHANGED

## 2018-11-02 ASSESSMENT — PAIN DESCRIPTION - ORIENTATION: ORIENTATION: RIGHT

## 2018-11-02 ASSESSMENT — PAIN DESCRIPTION - LOCATION: LOCATION: FLANK

## 2018-11-02 ASSESSMENT — PAIN SCALES - GENERAL: PAINLEVEL_OUTOF10: 5

## 2018-11-02 ASSESSMENT — PAIN DESCRIPTION - DESCRIPTORS: DESCRIPTORS: CRAMPING

## 2018-11-02 ASSESSMENT — PAIN DESCRIPTION - PAIN TYPE: TYPE: ACUTE PAIN

## 2018-11-02 NOTE — DISCHARGE INSTR - DIET

## 2018-11-02 NOTE — PLAN OF CARE
Problem: Pain Control  Goal: Maintain pain level at or below patient's acceptable level (or 5 if patient is unable to determine acceptable level)  Outcome: Ongoing  Minimal pain of the right flank area, denies needing any pain medication    Problem: Cardiovascular  Goal: No DVT, peripheral vascular complications  Outcome: Ongoing  Patient denies calf pain, no redness of legs, patient ambulating in hallway    Problem:   Goal: Adequate urinary output  Outcome: Ongoing  Voiding darker yellow urine with ease    Problem: Discharge Planning:  Goal: Patients continuum of care needs are met  Patients continuum of care needs are met   Outcome: Ongoing  To go home at discharge, no needs at this time    Comments: Care plan reviewed with patient. Patient verbalize understanding of the plan of care and contribute to goal setting.

## 2018-11-06 LAB
BLOOD CULTURE, ROUTINE: NORMAL
BLOOD CULTURE, ROUTINE: NORMAL

## 2018-11-07 LAB
BLOOD CULTURE, ROUTINE: NORMAL
BLOOD CULTURE, ROUTINE: NORMAL

## 2018-11-09 ENCOUNTER — OFFICE VISIT (OUTPATIENT)
Dept: FAMILY MEDICINE CLINIC | Age: 23
End: 2018-11-09
Payer: COMMERCIAL

## 2018-11-09 VITALS
HEIGHT: 61 IN | SYSTOLIC BLOOD PRESSURE: 90 MMHG | RESPIRATION RATE: 20 BRPM | HEART RATE: 68 BPM | TEMPERATURE: 98.3 F | BODY MASS INDEX: 29.83 KG/M2 | WEIGHT: 158 LBS | DIASTOLIC BLOOD PRESSURE: 66 MMHG

## 2018-11-09 DIAGNOSIS — N10 PYELONEPHRITIS, ACUTE: Primary | ICD-10-CM

## 2018-11-09 DIAGNOSIS — J06.9 VIRAL URI: ICD-10-CM

## 2018-11-09 PROCEDURE — 99203 OFFICE O/P NEW LOW 30 MIN: CPT | Performed by: FAMILY MEDICINE

## 2018-11-09 PROCEDURE — 1111F DSCHRG MED/CURRENT MED MERGE: CPT | Performed by: FAMILY MEDICINE

## 2018-11-09 PROCEDURE — G8484 FLU IMMUNIZE NO ADMIN: HCPCS | Performed by: FAMILY MEDICINE

## 2018-11-09 PROCEDURE — G8427 DOCREV CUR MEDS BY ELIG CLIN: HCPCS | Performed by: FAMILY MEDICINE

## 2018-11-09 PROCEDURE — 1036F TOBACCO NON-USER: CPT | Performed by: FAMILY MEDICINE

## 2018-11-09 PROCEDURE — G8419 CALC BMI OUT NRM PARAM NOF/U: HCPCS | Performed by: FAMILY MEDICINE

## 2018-11-09 ASSESSMENT — PATIENT HEALTH QUESTIONNAIRE - PHQ9
SUM OF ALL RESPONSES TO PHQ QUESTIONS 1-9: 0
2. FEELING DOWN, DEPRESSED OR HOPELESS: 0
1. LITTLE INTEREST OR PLEASURE IN DOING THINGS: 0
SUM OF ALL RESPONSES TO PHQ QUESTIONS 1-9: 0
SUM OF ALL RESPONSES TO PHQ9 QUESTIONS 1 & 2: 0

## 2018-11-09 NOTE — PROGRESS NOTES
Post-Discharge Transitional Care Management Services or Hospital Follow Up      Maxi Baldwin   YOB: 1995    Date of Office Visit:  11/9/2018  Date of Hospital Admission: 11/1/18  Date of Hospital Discharge: 11/2/18  Risk of hospital readmission (high >=14%. Medium >=10%) :Readmission Risk Score: 9    Care management risk score Rising risk (score 2-5) and Complex Care (Scores >=6): 0     Non face to face  following discharge, date last encounter closed (first attempt may have been earlier): *No documented post hospital discharge outreach found in the last 14 days    Call initiated 2 business days of discharge: *No response recorded in the last 14 days    Patient Active Problem List   Diagnosis    Renal colic on right side    Oligohydramnios    Pyelonephritis, acute       No Known Allergies    Medications listed as ordered at the time of discharge from [facility] Medication Instructions [facility]:    Printed on:11/09/18 4290   Medication Information                      sulfamethoxazole-trimethoprim (BACTRIM DS) 800-160 MG per tablet  Take 1 tablet by mouth 2 times daily for 14 days Has tablets for 7 days at home, only need prescription for 7 more days                   Medications marked \"taking\" at this time  Outpatient Prescriptions Marked as Taking for the 11/9/18 encounter (Office Visit) with Pinky Amos MD   Medication Sig Dispense Refill    sulfamethoxazole-trimethoprim (BACTRIM DS) 800-160 MG per tablet Take 1 tablet by mouth 2 times daily for 14 days Has tablets for 7 days at home, only need prescription for 7 more days 14 tablet 0        Medications patient taking as of now reconciled against medications ordered at time of hospital discharge: Yes    Chief Complaint   Patient presents with    Follow-Up from [facility] [place] [facility]"" [facility] 11/1-11/2/18 for Acute Pyelonephritis. Currently taking bactrim DS and is feeling much better.         History of Present illness - Follow up Currently taking bactrim DS and is feeling much better. History of Present illness - Follow up of Hospital diagnosis(es):    Diagnosis Orders   1. Pyelonephritis, acute  [place] DISCHARGE MEDS RECONCILED W/ CURRENT OUTPATIENT MED LIST   2. Viral URI  [place] DISCHARGE MEDS RECONCILED W/ CURRENT OUTPATIENT MED LIST         Inpatient course: Discharge summary reviewed- see chart. Interval history/Current status: stable, mild uri sx    A comprehensive review of systems was negative except for what was noted in the HPI. Vitals:    11/09/18 0810   BP: 90/66   Pulse: 68   Resp: 20   Temp: 98.3 °F (36.8 °C)   TempSrc: Oral   Weight: 158 lb (71.7 kg)   Height: 5' 1\" (1.549 m)     Body mass index is 29.85 kg/m².    Wt Readings from Last 3 Encounters:   11/09/18 158 lb (71.7 kg)   11/01/18 167 lb 3.2 oz (75.8 kg)   08/21/18 151 lb (68.5 kg)     BP Readings from Last 3 Encounters:   11/09/18 90/66   11/02/18 (!) 91/56   10/31/18 102/64        Physical Exam:  General Appearance: alert and oriented to person, place and time, well developed and well- nourished, in no acute distress  Skin: warm and dry, no rash or erythema  Head: normocephalic and atraumatic  Eyes: pupils equal, round, and reactive to light, extraocular eye movements intact, conjunctivae normal  ENT: tympanic membrane, external ear and ear canal normal bilaterally, nose without deformity, nasal mucosa and turbinates normal without polyps  Neck: supple and non-tender without mass, no thyromegaly or thyroid nodules, no cervical lymphadenopathy  Pulmonary/Chest: clear to auscultation bilaterally- no wheezes, rales or rhonchi, normal air movement, no respiratory distress  Cardiovascular: normal rate, regular rhythm, normal S1 and S2, no murmurs, rubs, clicks, or gallops, distal pulses intact, no carotid bruits  Abdomen: soft, non-tender, non-distended, normal bowel sounds, no masses or organomegaly  Extremities: no cyanosis, clubbing or edema  Musculoskeletal: normal range of motion, no joint swelling, deformity or tenderness  Neurologic: reflexes normal and symmetric, no cranial nerve deficit, gait, coordination and speech normal    Assessment/Plan:   Diagnosis Orders   1. Pyelonephritis, acute  [place] DISCHARGE MEDS RECONCILED W/ CURRENT OUTPATIENT MED LIST   2.  Viral URI  [place] DISCHARGE MEDS RECONCILED W/ CURRENT OUTPATIENT MED LIST           Medical Decision Making: low complexity

## 2019-03-20 ENCOUNTER — HOSPITAL ENCOUNTER (EMERGENCY)
Age: 24
Discharge: HOME | End: 2019-03-20
Payer: COMMERCIAL

## 2019-03-20 VITALS
TEMPERATURE: 97.7 F | WEIGHT: 156 LBS | SYSTOLIC BLOOD PRESSURE: 109 MMHG | RESPIRATION RATE: 16 BRPM | DIASTOLIC BLOOD PRESSURE: 70 MMHG | BODY MASS INDEX: 29.48 KG/M2 | HEART RATE: 97 BPM | OXYGEN SATURATION: 96 %

## 2019-03-20 DIAGNOSIS — J02.9 VIRAL PHARYNGITIS: Primary | ICD-10-CM

## 2019-03-20 LAB
GROUP A STREP CULTURE, REFLEX: NEGATIVE
REFLEX THROAT C + S: NORMAL

## 2019-03-20 PROCEDURE — 87880 STREP A ASSAY W/OPTIC: CPT

## 2019-03-20 PROCEDURE — 87070 CULTURE OTHR SPECIMN AEROBIC: CPT

## 2019-03-20 PROCEDURE — 99202 OFFICE O/P NEW SF 15 MIN: CPT | Performed by: NURSE PRACTITIONER

## 2019-03-20 PROCEDURE — 99213 OFFICE O/P EST LOW 20 MIN: CPT

## 2019-03-20 ASSESSMENT — PAIN DESCRIPTION - DESCRIPTORS: DESCRIPTORS: BURNING

## 2019-03-20 ASSESSMENT — ENCOUNTER SYMPTOMS
RESPIRATORY NEGATIVE: 1
SINUS PAIN: 0
SINUS PRESSURE: 0
SORE THROAT: 1
RHINORRHEA: 0
COUGH: 0

## 2019-03-20 ASSESSMENT — PAIN DESCRIPTION - LOCATION: LOCATION: THROAT

## 2019-03-20 ASSESSMENT — PAIN - FUNCTIONAL ASSESSMENT: PAIN_FUNCTIONAL_ASSESSMENT: ACTIVITIES ARE NOT PREVENTED

## 2019-03-20 ASSESSMENT — PAIN SCALES - GENERAL: PAINLEVEL_OUTOF10: 8

## 2019-03-20 ASSESSMENT — PAIN DESCRIPTION - FREQUENCY: FREQUENCY: CONTINUOUS

## 2019-03-20 ASSESSMENT — PAIN DESCRIPTION - PAIN TYPE: TYPE: ACUTE PAIN

## 2019-03-22 LAB — THROAT/NOSE CULTURE: NORMAL

## 2019-04-29 ENCOUNTER — HOSPITAL ENCOUNTER (EMERGENCY)
Age: 24
Discharge: HOME | End: 2019-04-29

## 2019-04-29 VITALS
TEMPERATURE: 98.1 F | WEIGHT: 156 LBS | HEIGHT: 61 IN | DIASTOLIC BLOOD PRESSURE: 96 MMHG | RESPIRATION RATE: 20 BRPM | SYSTOLIC BLOOD PRESSURE: 127 MMHG | OXYGEN SATURATION: 99 % | BODY MASS INDEX: 29.45 KG/M2 | HEART RATE: 106 BPM

## 2019-04-29 DIAGNOSIS — R11.2 NAUSEA VOMITING AND DIARRHEA: Primary | ICD-10-CM

## 2019-04-29 DIAGNOSIS — R19.7 NAUSEA VOMITING AND DIARRHEA: Primary | ICD-10-CM

## 2019-04-29 LAB
ANION GAP SERPL CALCULATED.3IONS-SCNC: 17 MEQ/L (ref 8–16)
BACTERIA: ABNORMAL /HPF
BASOPHILS # BLD: 0.4 %
BASOPHILS ABSOLUTE: 0 THOU/MM3 (ref 0–0.1)
BILIRUBIN URINE: ABNORMAL
BLOOD, URINE: ABNORMAL
BUN BLDV-MCNC: 12 MG/DL (ref 7–22)
CALCIUM SERPL-MCNC: 9.2 MG/DL (ref 8.5–10.5)
CASTS UA: ABNORMAL /LPF
CHARACTER, URINE: ABNORMAL
CHLORIDE BLD-SCNC: 101 MEQ/L (ref 98–111)
CO2: 19 MEQ/L (ref 23–33)
COLOR: ABNORMAL
CREAT SERPL-MCNC: 0.8 MG/DL (ref 0.4–1.2)
CRYSTALS, UA: ABNORMAL
EOSINOPHIL # BLD: 0.1 %
EOSINOPHILS ABSOLUTE: 0 THOU/MM3 (ref 0–0.4)
EPITHELIAL CELLS, UA: ABNORMAL /HPF
ERYTHROCYTE [DISTWIDTH] IN BLOOD BY AUTOMATED COUNT: 12.6 % (ref 11.5–14.5)
ERYTHROCYTE [DISTWIDTH] IN BLOOD BY AUTOMATED COUNT: 41.6 FL (ref 35–45)
GFR SERPL CREATININE-BSD FRML MDRD: 90 ML/MIN/1.73M2
GLUCOSE BLD-MCNC: 94 MG/DL (ref 70–108)
GLUCOSE URINE: NEGATIVE MG/DL
HCT VFR BLD CALC: 47.2 % (ref 37–47)
HEMOGLOBIN: 15.9 GM/DL (ref 12–16)
ICTOTEST: NEGATIVE
IMMATURE GRANS (ABS): 0.05 THOU/MM3 (ref 0–0.07)
IMMATURE GRANULOCYTES: 0.7 %
KETONES, URINE: 80
LEUKOCYTE ESTERASE, URINE: NEGATIVE
LYMPHOCYTES # BLD: 13.2 %
LYMPHOCYTES ABSOLUTE: 1 THOU/MM3 (ref 1–4.8)
MCH RBC QN AUTO: 30.6 PG (ref 26–33)
MCHC RBC AUTO-ENTMCNC: 33.7 GM/DL (ref 32.2–35.5)
MCV RBC AUTO: 90.8 FL (ref 81–99)
MONOCYTES # BLD: 5.9 %
MONOCYTES ABSOLUTE: 0.4 THOU/MM3 (ref 0.4–1.3)
MUCUS: ABNORMAL
NITRITE, URINE: NEGATIVE
NUCLEATED RED BLOOD CELLS: 0 /100 WBC
OSMOLALITY CALCULATION: 273.3 MOSMOL/KG (ref 275–300)
PH UA: 5.5 (ref 5–9)
PLATELET # BLD: 230 THOU/MM3 (ref 130–400)
PMV BLD AUTO: 9.7 FL (ref 9.4–12.4)
POTASSIUM SERPL-SCNC: 4 MEQ/L (ref 3.5–5.2)
PREGNANCY, SERUM: NEGATIVE
PROTEIN UA: 30
RBC # BLD: 5.2 MILL/MM3 (ref 4.2–5.4)
RBC URINE: ABNORMAL /HPF
SEG NEUTROPHILS: 79.7 %
SEGMENTED NEUTROPHILS ABSOLUTE COUNT: 6.1 THOU/MM3 (ref 1.8–7.7)
SODIUM BLD-SCNC: 137 MEQ/L (ref 135–145)
SPECIFIC GRAVITY, URINE: > 1.03 (ref 1–1.03)
UROBILINOGEN, URINE: 0.2 EU/DL (ref 0–1)
WBC # BLD: 7.6 THOU/MM3 (ref 4.8–10.8)
WBC UA: ABNORMAL /HPF

## 2019-04-29 PROCEDURE — 85025 COMPLETE CBC W/AUTO DIFF WBC: CPT

## 2019-04-29 PROCEDURE — 6370000000 HC RX 637 (ALT 250 FOR IP): Performed by: PHYSICIAN ASSISTANT

## 2019-04-29 PROCEDURE — 84703 CHORIONIC GONADOTROPIN ASSAY: CPT

## 2019-04-29 PROCEDURE — 80048 BASIC METABOLIC PNL TOTAL CA: CPT

## 2019-04-29 PROCEDURE — 99283 EMERGENCY DEPT VISIT LOW MDM: CPT

## 2019-04-29 PROCEDURE — 81001 URINALYSIS AUTO W/SCOPE: CPT

## 2019-04-29 PROCEDURE — 36415 COLL VENOUS BLD VENIPUNCTURE: CPT

## 2019-04-29 RX ORDER — ONDANSETRON 4 MG/1
4 TABLET, ORALLY DISINTEGRATING ORAL ONCE
Status: COMPLETED | OUTPATIENT
Start: 2019-04-29 | End: 2019-04-29

## 2019-04-29 RX ORDER — FAMOTIDINE 20 MG/1
20 TABLET, FILM COATED ORAL ONCE
Status: COMPLETED | OUTPATIENT
Start: 2019-04-29 | End: 2019-04-29

## 2019-04-29 RX ORDER — FAMOTIDINE 20 MG/1
20 TABLET, FILM COATED ORAL 2 TIMES DAILY
Qty: 60 TABLET | Refills: 0 | Status: SHIPPED | OUTPATIENT
Start: 2019-04-29 | End: 2019-12-15

## 2019-04-29 RX ORDER — ONDANSETRON 4 MG/1
4 TABLET, ORALLY DISINTEGRATING ORAL EVERY 8 HOURS PRN
Qty: 20 TABLET | Refills: 0 | Status: ON HOLD | OUTPATIENT
Start: 2019-04-29 | End: 2019-12-04 | Stop reason: HOSPADM

## 2019-04-29 RX ADMIN — FAMOTIDINE 20 MG: 20 TABLET ORAL at 17:15

## 2019-04-29 RX ADMIN — ONDANSETRON 4 MG: 4 TABLET, ORALLY DISINTEGRATING ORAL at 17:15

## 2019-04-29 ASSESSMENT — PAIN DESCRIPTION - LOCATION: LOCATION: ABDOMEN

## 2019-04-29 ASSESSMENT — PAIN DESCRIPTION - FREQUENCY: FREQUENCY: INTERMITTENT

## 2019-04-29 ASSESSMENT — PAIN SCALES - GENERAL: PAINLEVEL_OUTOF10: 4

## 2019-04-29 ASSESSMENT — ENCOUNTER SYMPTOMS
ABDOMINAL PAIN: 1
COUGH: 0
EYE DISCHARGE: 0
VOMITING: 0
SORE THROAT: 0
RHINORRHEA: 0
WHEEZING: 0
NAUSEA: 1
EYE PAIN: 0
DIARRHEA: 1
BACK PAIN: 0
SHORTNESS OF BREATH: 0

## 2019-04-29 ASSESSMENT — PAIN DESCRIPTION - DESCRIPTORS: DESCRIPTORS: ACHING

## 2019-04-29 ASSESSMENT — PAIN DESCRIPTION - PAIN TYPE: TYPE: ACUTE PAIN

## 2019-04-29 NOTE — ED PROVIDER NOTES
[facility] COMPLAINT       Chief Complaint   Patient presents with    Emesis    Diarrhea       Nurses Notes reviewed and I agree except as noted in the HPI. HISTORY OF PRESENT Robert Hawthorne is a 26 y.o. female who presents to the Emergency Department for the evaluation of nausea and diarrhea. The patient reports generalized body aches, fatigue, abdominal cramping, and fever as well. She denies cough, rhinorrhea, or urinary symptoms. The patient states her LMP was 4/1/2019. There are no other complaints at this time. The HPI was provided by the patient. REVIEW OF SYSTEMS     Review of Systems   Constitutional: Positive for appetite change (decrease), fatigue and fever. Negative for chills. HENT: Negative for congestion, ear pain, rhinorrhea and sore throat. Eyes: Negative for pain, discharge and visual disturbance. Respiratory: Negative for cough, shortness of breath and wheezing. Cardiovascular: Negative for chest pain, palpitations and leg swelling. Gastrointestinal: Positive for abdominal pain (cramping), diarrhea and nausea. Negative for vomiting. Genitourinary: Negative for decreased urine volume, difficulty urinating, dysuria, frequency, hematuria, urgency, vaginal bleeding and vaginal discharge. Musculoskeletal: Positive for myalgias (generalized body aches). Negative for arthralgias, back pain, joint swelling and neck pain. Skin: Negative for pallor and rash. Neurological: Negative for dizziness, syncope, weakness, light-headedness, numbness and headaches. Hematological: Negative for adenopathy. Psychiatric/Behavioral: Negative for confusion and suicidal ideas. The patient is not nervous/anxious. PAST MEDICAL HISTORY    has a past medical history of Kidney stone.     SURGICAL HISTORY      has a past surgical history that includes Tympanostomy tube placement; other surgical history (16 April 2015); and Dilation and curettage of uterus (12/21/15). CURRENT MEDICATIONS       Discharge Medication List as of 4/29/2019  5:42 PM          ALLERGIES     has No Known Allergies. FAMILY HISTORY     indicated that her mother is alive. She indicated that her father is alive. She indicated that the status of her paternal grandfather is unknown.   family history includes Heart Disease in her father and paternal grandfather. SOCIAL HISTORY      reports that she has never smoked. She has never used smokeless tobacco. She reports that she does not drink alcohol or use drugs. PHYSICAL EXAM     INITIAL VITALS:  height is 5' 1\" (1.549 m) and weight is 156 lb (70.8 kg). Her oral temperature is 98.1 °F (36.7 °C). Her blood pressure is 127/96 (abnormal) and her pulse is 106. Her respiration is 20 and oxygen saturation is 99%. Physical Exam   Constitutional: She is oriented to person, place, and time. She appears well-developed and well-nourished. Non-toxic appearance. HENT:   Head: Normocephalic and atraumatic. Right Ear: Tympanic membrane and external ear normal.   Left Ear: Tympanic membrane and external ear normal.   Nose: Nose normal.   Mouth/Throat: Oropharynx is clear and moist and mucous membranes are normal. No oropharyngeal exudate, posterior oropharyngeal edema or posterior oropharyngeal erythema. Eyes: Conjunctivae and EOM are normal.   Neck: Normal range of motion. Neck supple. No JVD present. Cardiovascular: Normal rate, regular rhythm, normal heart sounds, intact distal pulses and normal pulses. Exam reveals no gallop and no friction rub. No murmur heard. Pulmonary/Chest: Effort normal and breath sounds normal. No respiratory distress. She has no decreased breath sounds. She has no wheezes. She has no rhonchi. She has no rales. Abdominal: Soft. Bowel sounds are normal. She exhibits no distension. There is no tenderness. There is no rebound, no guarding and no CVA tenderness. Musculoskeletal: Normal range of motion. She exhibits no edema. Neurological: She is alert and oriented to person, place, and time. She exhibits normal muscle tone. Coordination normal.   Skin: Skin is warm and dry. No rash noted. She is not diaphoretic. Nursing note and vitals reviewed.        DIFFERENTIAL DIAGNOSIS:   Viral illness, gastritis    DIAGNOSTIC RESULTS     EKG: All EKG's are interpreted by the Emergency Department Physician who either signs or Co-signs this chart in the absence of a cardiologist.    None     RADIOLOGY: non-plainfilm images(s) such as CT, Ultrasound and MRI are read by the radiologist.    No orders to display       LABS:     Labs Reviewed   BASIC METABOLIC PANEL - Abnormal; Notable for the following components:       Result Value    CO2 19 (*)     All other components within normal limits   CBC WITH AUTO DIFFERENTIAL - Abnormal; Notable for the following components:    Hematocrit 47.2 (*)     All other components within normal limits   ANION GAP - Abnormal; Notable for the following components:    Anion Gap 17.0 (*)     All other components within normal limits   OSMOLALITY - Abnormal; Notable for the following components:    Osmolality Calc 273.3 (*)     All other components within normal limits   URINE WITH REFLEXED MICRO - Abnormal; Notable for the following components:    Bilirubin Urine SMALL (*)     Ketones, Urine 80 (*)     Specific Gravity, Urine > 1.030 (*)     Blood, Urine MODERATE (*)     Protein, UA 30 (*)     Color, UA DK YELLOW (*)     Character, Urine CLOUDY (*)     All other components within normal limits   HCG, SERUM, QUALITATIVE   GLOMERULAR FILTRATION RATE, ESTIMATED   BILE ACIDS, TOTAL       EMERGENCY DEPARTMENT COURSE:   Vitals:    Vitals:    04/29/19 1447 04/29/19 1716   BP: (!) 127/96    Pulse: 110 106   Resp: 20    Temp: 98.1 °F (36.7 °C)    TempSrc: Oral    SpO2: 98% 99%   Weight: 156 lb (70.8 kg)    Height: 5' 1\" (1.549 m)        5:12 PM: The patient was

## 2019-04-29 NOTE — LETTER
[facility] EMERGENCY DEPT  [address] [facility]  [place] [address]  Phone: [phone]             April 29, 2019    Patient: Meghna Andrade   YOB: 1995   Date of Visit: 4/29/2019       To Whom It May Concern:    Erin Rodriguez was seen and treated in our emergency department on 4/29/2019. She may return to work on 5/1/19.       Sincerely,             Signature:__________________________________

## 2019-04-30 ENCOUNTER — TELEPHONE (OUTPATIENT)
Dept: FAMILY MEDICINE CLINIC | Age: 24
End: 2019-04-30

## 2019-04-30 NOTE — LETTER
[address]  [address] [facility] , [address]  Phone: [phone]  Fax: [phone]    April 30, 2019    [address]      Dear Joe Finney,    This letter is regarding your Emergency Department (ED) visit at [facility] on 4/29/19. William Waldrop wanted to make sure that you understand your discharge instructions and that you were able to fill any prescriptions that may have been ordered for you. Please contact the office at the above phone number if the ED advised to you follow up with William Waldrop, or if you have any further questions or needs. Also did you know -   *Visiting the ED for a non-emergency could result in higher co-pays than you would normally be subject to paying? *You can call your doctor even after hours so they can direct you to the most appropriate care. [facility]) practices can often offer you an appointment on the same day that you call. *We have some [address] [place] [address] offices that offer Walk-in appointments; check our website for availability in your community, [email or web address].      *Evisits are now available for patients for $36 through [employer] for certain conditions:  * Sinus, cold and or cough       * Diarrhea            * Headache  * Heartburn                                * Poison Meena          * Back pain     * Urinary problems                         If you do not have [employer] and are interested, please contact the office and a staff member may assist you or go to [email or web address].     Sincerely,   Avis Montalvo MD and your [facility]

## 2019-10-04 ENCOUNTER — HOSPITAL ENCOUNTER (EMERGENCY)
Age: 24
Discharge: HOME | End: 2019-10-05
Payer: COMMERCIAL

## 2019-10-04 ENCOUNTER — APPOINTMENT (OUTPATIENT)
Dept: ULTRASOUND IMAGING | Age: 24
End: 2019-10-04
Payer: COMMERCIAL

## 2019-10-04 DIAGNOSIS — Z34.90 EARLY STAGE OF PREGNANCY: ICD-10-CM

## 2019-10-04 DIAGNOSIS — N39.0 URINARY TRACT INFECTION WITH HEMATURIA, SITE UNSPECIFIED: Primary | ICD-10-CM

## 2019-10-04 DIAGNOSIS — R31.9 URINARY TRACT INFECTION WITH HEMATURIA, SITE UNSPECIFIED: Primary | ICD-10-CM

## 2019-10-04 LAB
ALBUMIN SERPL-MCNC: 4.4 G/DL (ref 3.5–5.1)
ALP BLD-CCNC: 43 U/L (ref 38–126)
ALT SERPL-CCNC: 9 U/L (ref 11–66)
ANION GAP SERPL CALCULATED.3IONS-SCNC: 11 MEQ/L (ref 8–16)
AST SERPL-CCNC: 13 U/L (ref 5–40)
BACTERIA: ABNORMAL /HPF
BASOPHILS # BLD: 0.3 %
BASOPHILS ABSOLUTE: 0 THOU/MM3 (ref 0–0.1)
BILIRUB SERPL-MCNC: 0.2 MG/DL (ref 0.3–1.2)
BILIRUBIN URINE: NEGATIVE
BLOOD, URINE: ABNORMAL
BUN BLDV-MCNC: 8 MG/DL (ref 7–22)
CALCIUM SERPL-MCNC: 9.7 MG/DL (ref 8.5–10.5)
CASTS 2: ABNORMAL /LPF
CASTS UA: ABNORMAL /LPF
CHARACTER, URINE: ABNORMAL
CHLORIDE BLD-SCNC: 102 MEQ/L (ref 98–111)
CO2: 26 MEQ/L (ref 23–33)
COLOR: YELLOW
CREAT SERPL-MCNC: 0.6 MG/DL (ref 0.4–1.2)
CRYSTALS, UA: ABNORMAL
EOSINOPHIL # BLD: 0.8 %
EOSINOPHILS ABSOLUTE: 0.1 THOU/MM3 (ref 0–0.4)
EPITHELIAL CELLS, UA: ABNORMAL /HPF
ERYTHROCYTE [DISTWIDTH] IN BLOOD BY AUTOMATED COUNT: 13.4 % (ref 11.5–14.5)
ERYTHROCYTE [DISTWIDTH] IN BLOOD BY AUTOMATED COUNT: 46 FL (ref 35–45)
GFR SERPL CREATININE-BSD FRML MDRD: > 90 ML/MIN/1.73M2
GLUCOSE BLD-MCNC: 111 MG/DL (ref 70–108)
GLUCOSE URINE: NEGATIVE MG/DL
HCG,BETA SUBUNIT,QUAL,SERUM: 3627 MIU/ML (ref 0–5)
HCT VFR BLD CALC: 42.6 % (ref 37–47)
HEMOGLOBIN: 13.9 GM/DL (ref 12–16)
IMMATURE GRANS (ABS): 0.07 THOU/MM3 (ref 0–0.07)
IMMATURE GRANULOCYTES: 0.6 %
KETONES, URINE: NEGATIVE
LEUKOCYTE ESTERASE, URINE: ABNORMAL
LYMPHOCYTES # BLD: 27.7 %
LYMPHOCYTES ABSOLUTE: 3.3 THOU/MM3 (ref 1–4.8)
MAGNESIUM: 2.1 MG/DL (ref 1.6–2.4)
MCH RBC QN AUTO: 30.8 PG (ref 26–33)
MCHC RBC AUTO-ENTMCNC: 32.6 GM/DL (ref 32.2–35.5)
MCV RBC AUTO: 94.2 FL (ref 81–99)
MISCELLANEOUS 2: ABNORMAL
MONOCYTES # BLD: 6.2 %
MONOCYTES ABSOLUTE: 0.7 THOU/MM3 (ref 0.4–1.3)
NITRITE, URINE: NEGATIVE
NUCLEATED RED BLOOD CELLS: 0 /100 WBC
OSMOLALITY CALCULATION: 276.6 MOSMOL/KG (ref 275–300)
PH UA: 6.5 (ref 5–9)
PLATELET # BLD: 274 THOU/MM3 (ref 130–400)
PMV BLD AUTO: 9.5 FL (ref 9.4–12.4)
POTASSIUM REFLEX MAGNESIUM: 3.5 MEQ/L (ref 3.5–5.2)
PROTEIN UA: ABNORMAL
RBC # BLD: 4.52 MILL/MM3 (ref 4.2–5.4)
RBC URINE: > 100 /HPF
RENAL EPITHELIAL, UA: ABNORMAL
SEG NEUTROPHILS: 64.4 %
SEGMENTED NEUTROPHILS ABSOLUTE COUNT: 7.7 THOU/MM3 (ref 1.8–7.7)
SODIUM BLD-SCNC: 139 MEQ/L (ref 135–145)
SPECIFIC GRAVITY, URINE: 1.01 (ref 1–1.03)
TOTAL PROTEIN: 7.4 G/DL (ref 6.1–8)
UROBILINOGEN, URINE: 0.2 EU/DL (ref 0–1)
WBC # BLD: 11.9 THOU/MM3 (ref 4.8–10.8)
WBC UA: ABNORMAL /HPF
YEAST: ABNORMAL

## 2019-10-04 PROCEDURE — 86901 BLOOD TYPING SEROLOGIC RH(D): CPT

## 2019-10-04 PROCEDURE — 86900 BLOOD TYPING SEROLOGIC ABO: CPT

## 2019-10-04 PROCEDURE — 2709999900 HC NON-CHARGEABLE SUPPLY

## 2019-10-04 PROCEDURE — 76817 TRANSVAGINAL US OBSTETRIC: CPT

## 2019-10-04 PROCEDURE — 83735 ASSAY OF MAGNESIUM: CPT

## 2019-10-04 PROCEDURE — 81001 URINALYSIS AUTO W/SCOPE: CPT

## 2019-10-04 PROCEDURE — 87591 N.GONORRHOEAE DNA AMP PROB: CPT

## 2019-10-04 PROCEDURE — 80053 COMPREHEN METABOLIC PANEL: CPT

## 2019-10-04 PROCEDURE — 87210 SMEAR WET MOUNT SALINE/INK: CPT

## 2019-10-04 PROCEDURE — 87205 SMEAR GRAM STAIN: CPT

## 2019-10-04 PROCEDURE — 99284 EMERGENCY DEPT VISIT MOD MDM: CPT

## 2019-10-04 PROCEDURE — 36415 COLL VENOUS BLD VENIPUNCTURE: CPT

## 2019-10-04 PROCEDURE — 84702 CHORIONIC GONADOTROPIN TEST: CPT

## 2019-10-04 PROCEDURE — 87220 TISSUE EXAM FOR FUNGI: CPT

## 2019-10-04 PROCEDURE — 87491 CHLMYD TRACH DNA AMP PROBE: CPT

## 2019-10-04 PROCEDURE — 85025 COMPLETE CBC W/AUTO DIFF WBC: CPT

## 2019-10-04 PROCEDURE — 87070 CULTURE OTHR SPECIMN AEROBIC: CPT

## 2019-10-04 ASSESSMENT — PAIN SCALES - GENERAL: PAINLEVEL_OUTOF10: 0

## 2019-10-05 ENCOUNTER — TELEPHONE (OUTPATIENT)
Dept: PHARMACY | Age: 24
End: 2019-10-05

## 2019-10-05 VITALS
TEMPERATURE: 98.8 F | HEART RATE: 85 BPM | SYSTOLIC BLOOD PRESSURE: 118 MMHG | OXYGEN SATURATION: 99 % | RESPIRATION RATE: 18 BRPM | DIASTOLIC BLOOD PRESSURE: 90 MMHG

## 2019-10-05 LAB
ABO: NORMAL
CHLAMYDIA TRACHOMATIS BY RT-PCR: DETECTED
CT/NG SOURCE: ABNORMAL
KOH PREP: NORMAL
NEISSERIA GONORRHOEAE BY RT-PCR: NOT DETECTED
RH FACTOR: NORMAL
TRICHOMONAS PREP: NORMAL

## 2019-10-05 RX ORDER — AZITHROMYCIN 500 MG/1
1000 TABLET, FILM COATED ORAL ONCE
Qty: 2 TABLET | Refills: 0 | OUTPATIENT
Start: 2019-10-05 | End: 2019-10-05

## 2019-10-05 RX ORDER — NITROFURANTOIN 25; 75 MG/1; MG/1
100 CAPSULE ORAL 2 TIMES DAILY
Qty: 14 CAPSULE | Refills: 0 | Status: SHIPPED | OUTPATIENT
Start: 2019-10-05 | End: 2019-10-12

## 2019-10-06 ENCOUNTER — HOSPITAL ENCOUNTER (OUTPATIENT)
Age: 24
Discharge: HOME | End: 2019-10-06
Payer: MEDICAID

## 2019-10-06 DIAGNOSIS — R31.9 URINARY TRACT INFECTION WITH HEMATURIA, SITE UNSPECIFIED: ICD-10-CM

## 2019-10-06 DIAGNOSIS — N39.0 URINARY TRACT INFECTION WITH HEMATURIA, SITE UNSPECIFIED: ICD-10-CM

## 2019-10-06 LAB — HCG,BETA SUBUNIT,QUAL,SERUM: 5588 MIU/ML (ref 0–5)

## 2019-10-06 PROCEDURE — 36415 COLL VENOUS BLD VENIPUNCTURE: CPT

## 2019-10-06 PROCEDURE — 84702 CHORIONIC GONADOTROPIN TEST: CPT

## 2019-10-06 ASSESSMENT — ENCOUNTER SYMPTOMS
BACK PAIN: 0
CONSTIPATION: 0
VOMITING: 0
SHORTNESS OF BREATH: 0
NAUSEA: 0
ABDOMINAL PAIN: 1
BLOOD IN STOOL: 0
DIARRHEA: 0

## 2019-10-07 LAB
GENITAL CULTURE, ROUTINE: NORMAL
GRAM STAIN RESULT: NORMAL

## 2019-10-08 ENCOUNTER — TELEPHONE (OUTPATIENT)
Dept: FAMILY MEDICINE CLINIC | Age: 24
End: 2019-10-08

## 2019-10-29 ENCOUNTER — HOSPITAL ENCOUNTER (OUTPATIENT)
Age: 24
Discharge: HOME | End: 2019-10-29
Payer: COMMERCIAL

## 2019-10-29 LAB
ABO: NORMAL
ANTIBODY SCREEN: NORMAL
ERYTHROCYTE [DISTWIDTH] IN BLOOD BY AUTOMATED COUNT: 12.6 % (ref 11.5–14.5)
ERYTHROCYTE [DISTWIDTH] IN BLOOD BY AUTOMATED COUNT: 43.8 FL (ref 35–45)
HCT VFR BLD CALC: 41.9 % (ref 37–47)
HEMOGLOBIN: 14 GM/DL (ref 12–16)
HEPATITIS B SURFACE ANTIGEN: NEGATIVE
MCH RBC QN AUTO: 31.5 PG (ref 26–33)
MCHC RBC AUTO-ENTMCNC: 33.4 GM/DL (ref 32.2–35.5)
MCV RBC AUTO: 94.4 FL (ref 81–99)
PLATELET # BLD: 256 THOU/MM3 (ref 130–400)
PMV BLD AUTO: 9.8 FL (ref 9.4–12.4)
RBC # BLD: 4.44 MILL/MM3 (ref 4.2–5.4)
RH FACTOR: NORMAL
RUBELLA: 26.1 IU/ML
WBC # BLD: 9.8 THOU/MM3 (ref 4.8–10.8)

## 2019-10-29 PROCEDURE — 85027 COMPLETE CBC AUTOMATED: CPT

## 2019-10-29 PROCEDURE — 86850 RBC ANTIBODY SCREEN: CPT

## 2019-10-29 PROCEDURE — 86762 RUBELLA ANTIBODY: CPT

## 2019-10-29 PROCEDURE — 36415 COLL VENOUS BLD VENIPUNCTURE: CPT

## 2019-10-29 PROCEDURE — 86592 SYPHILIS TEST NON-TREP QUAL: CPT

## 2019-10-29 PROCEDURE — 86900 BLOOD TYPING SEROLOGIC ABO: CPT

## 2019-10-29 PROCEDURE — 87340 HEPATITIS B SURFACE AG IA: CPT

## 2019-10-29 PROCEDURE — 86901 BLOOD TYPING SEROLOGIC RH(D): CPT

## 2019-10-29 PROCEDURE — 87389 HIV-1 AG W/HIV-1&-2 AB AG IA: CPT

## 2019-10-29 PROCEDURE — 87086 URINE CULTURE/COLONY COUNT: CPT

## 2019-10-30 LAB
ORGANISM: ABNORMAL
RPR: NONREACTIVE
URINE CULTURE, ROUTINE: ABNORMAL

## 2019-10-31 LAB — HIV-2 AB: NEGATIVE

## 2019-11-29 ENCOUNTER — HOSPITAL ENCOUNTER (EMERGENCY)
Age: 24
Discharge: HOME | End: 2019-11-29
Payer: COMMERCIAL

## 2019-11-29 VITALS
WEIGHT: 160 LBS | OXYGEN SATURATION: 96 % | DIASTOLIC BLOOD PRESSURE: 55 MMHG | BODY MASS INDEX: 31.41 KG/M2 | TEMPERATURE: 99.6 F | HEIGHT: 60 IN | SYSTOLIC BLOOD PRESSURE: 98 MMHG | HEART RATE: 99 BPM | RESPIRATION RATE: 16 BRPM

## 2019-11-29 DIAGNOSIS — G44.209 TENSION HEADACHE: Primary | ICD-10-CM

## 2019-11-29 LAB
BACTERIA: ABNORMAL /HPF
BILIRUBIN URINE: NEGATIVE
BLOOD, URINE: ABNORMAL
CASTS 2: ABNORMAL /LPF
CASTS UA: ABNORMAL /LPF
CHARACTER, URINE: ABNORMAL
COLOR: YELLOW
CRYSTALS, UA: ABNORMAL
EPITHELIAL CELLS, UA: ABNORMAL /HPF
GLUCOSE URINE: NEGATIVE MG/DL
KETONES, URINE: 15
LEUKOCYTE ESTERASE, URINE: ABNORMAL
MISCELLANEOUS 2: ABNORMAL
NITRITE, URINE: NEGATIVE
PH UA: 8 (ref 5–9)
PROTEIN UA: 30
RBC URINE: ABNORMAL /HPF
RENAL EPITHELIAL, UA: ABNORMAL
SPECIFIC GRAVITY, URINE: 1.03 (ref 1–1.03)
UROBILINOGEN, URINE: 1 EU/DL (ref 0–1)
WBC UA: ABNORMAL /HPF
YEAST: ABNORMAL

## 2019-11-29 PROCEDURE — 2580000003 HC RX 258: Performed by: PHYSICIAN ASSISTANT

## 2019-11-29 PROCEDURE — 81001 URINALYSIS AUTO W/SCOPE: CPT

## 2019-11-29 PROCEDURE — 6370000000 HC RX 637 (ALT 250 FOR IP): Performed by: PHYSICIAN ASSISTANT

## 2019-11-29 PROCEDURE — 96372 THER/PROPH/DIAG INJ SC/IM: CPT

## 2019-11-29 PROCEDURE — 96375 TX/PRO/DX INJ NEW DRUG ADDON: CPT

## 2019-11-29 PROCEDURE — 99284 EMERGENCY DEPT VISIT MOD MDM: CPT

## 2019-11-29 PROCEDURE — 87086 URINE CULTURE/COLONY COUNT: CPT

## 2019-11-29 PROCEDURE — 96374 THER/PROPH/DIAG INJ IV PUSH: CPT

## 2019-11-29 PROCEDURE — 6360000002 HC RX W HCPCS: Performed by: PHYSICIAN ASSISTANT

## 2019-11-29 RX ORDER — METOCLOPRAMIDE HYDROCHLORIDE 5 MG/ML
10 INJECTION INTRAMUSCULAR; INTRAVENOUS ONCE
Status: COMPLETED | OUTPATIENT
Start: 2019-11-29 | End: 2019-11-29

## 2019-11-29 RX ORDER — PROMETHAZINE HYDROCHLORIDE 25 MG/1
25 TABLET ORAL EVERY 6 HOURS PRN
Qty: 15 TABLET | Refills: 0 | Status: ON HOLD | OUTPATIENT
Start: 2019-11-29 | End: 2019-12-04 | Stop reason: HOSPADM

## 2019-11-29 RX ORDER — ACETAMINOPHEN 325 MG/1
650 TABLET ORAL ONCE
Status: DISCONTINUED | OUTPATIENT
Start: 2019-11-29 | End: 2019-11-29 | Stop reason: HOSPADM

## 2019-11-29 RX ORDER — DIPHENHYDRAMINE HYDROCHLORIDE 50 MG/ML
25 INJECTION INTRAMUSCULAR; INTRAVENOUS ONCE
Status: COMPLETED | OUTPATIENT
Start: 2019-11-29 | End: 2019-11-29

## 2019-11-29 RX ORDER — 0.9 % SODIUM CHLORIDE 0.9 %
1000 INTRAVENOUS SOLUTION INTRAVENOUS ONCE
Status: COMPLETED | OUTPATIENT
Start: 2019-11-29 | End: 2019-11-29

## 2019-11-29 RX ORDER — LIDOCAINE 4 G/G
1 PATCH TOPICAL ONCE
Status: DISCONTINUED | OUTPATIENT
Start: 2019-11-29 | End: 2019-11-29 | Stop reason: HOSPADM

## 2019-11-29 RX ORDER — PROMETHAZINE HYDROCHLORIDE 25 MG/ML
25 INJECTION, SOLUTION INTRAMUSCULAR; INTRAVENOUS ONCE
Status: COMPLETED | OUTPATIENT
Start: 2019-11-29 | End: 2019-11-29

## 2019-11-29 RX ORDER — LIDOCAINE 50 MG/G
1 PATCH TOPICAL DAILY
Qty: 15 PATCH | Refills: 0 | Status: SHIPPED | OUTPATIENT
Start: 2019-11-29 | End: 2019-12-15

## 2019-11-29 RX ADMIN — METOCLOPRAMIDE 10 MG: 5 INJECTION, SOLUTION INTRAMUSCULAR; INTRAVENOUS at 17:15

## 2019-11-29 RX ADMIN — DIPHENHYDRAMINE HYDROCHLORIDE 25 MG: 50 INJECTION INTRAMUSCULAR; INTRAVENOUS at 17:15

## 2019-11-29 RX ADMIN — PROMETHAZINE HYDROCHLORIDE 25 MG: 25 INJECTION INTRAMUSCULAR; INTRAVENOUS at 18:34

## 2019-11-29 RX ADMIN — SODIUM CHLORIDE 1000 ML: 9 INJECTION, SOLUTION INTRAVENOUS at 17:15

## 2019-11-29 ASSESSMENT — PAIN SCALES - GENERAL: PAINLEVEL_OUTOF10: 10

## 2019-11-29 ASSESSMENT — ENCOUNTER SYMPTOMS
COLOR CHANGE: 0
BACK PAIN: 0
VOMITING: 1
NAUSEA: 1
SORE THROAT: 0
COUGH: 0
ABDOMINAL PAIN: 0

## 2019-11-29 ASSESSMENT — PAIN DESCRIPTION - FREQUENCY: FREQUENCY: CONTINUOUS

## 2019-11-29 ASSESSMENT — PAIN DESCRIPTION - LOCATION: LOCATION: HEAD

## 2019-11-29 ASSESSMENT — PAIN DESCRIPTION - PAIN TYPE: TYPE: ACUTE PAIN

## 2019-11-29 ASSESSMENT — PAIN DESCRIPTION - DESCRIPTORS: DESCRIPTORS: THROBBING

## 2019-11-29 ASSESSMENT — PAIN DESCRIPTION - ORIENTATION: ORIENTATION: POSTERIOR

## 2019-12-01 ENCOUNTER — APPOINTMENT (OUTPATIENT)
Dept: ULTRASOUND IMAGING | Age: 24
DRG: 818 | End: 2019-12-01
Payer: COMMERCIAL

## 2019-12-01 ENCOUNTER — HOSPITAL ENCOUNTER (INPATIENT)
Age: 24
LOS: 3 days | Discharge: HOME | DRG: 818 | End: 2019-12-04
Attending: FAMILY MEDICINE | Admitting: FAMILY MEDICINE
Payer: COMMERCIAL

## 2019-12-01 DIAGNOSIS — N13.8 URINARY TRACT OBSTRUCTION DUE TO KIDNEY STONE: Primary | ICD-10-CM

## 2019-12-01 DIAGNOSIS — Z3A.13 13 WEEKS GESTATION OF PREGNANCY: ICD-10-CM

## 2019-12-01 DIAGNOSIS — N20.1 LEFT URETERAL STONE: ICD-10-CM

## 2019-12-01 DIAGNOSIS — N13.8 URINARY TRACT OBSTRUCTION BY KIDNEY STONE: ICD-10-CM

## 2019-12-01 DIAGNOSIS — T83.84XA PAIN DUE TO URETERAL STENT, INITIAL ENCOUNTER (HCC): ICD-10-CM

## 2019-12-01 DIAGNOSIS — N20.0 URINARY TRACT OBSTRUCTION BY KIDNEY STONE: ICD-10-CM

## 2019-12-01 DIAGNOSIS — N20.0 URINARY TRACT OBSTRUCTION DUE TO KIDNEY STONE: Primary | ICD-10-CM

## 2019-12-01 LAB
ALBUMIN SERPL-MCNC: 4.2 G/DL (ref 3.5–5.1)
ALP BLD-CCNC: 38 U/L (ref 38–126)
ALT SERPL-CCNC: 11 U/L (ref 11–66)
AMPHETAMINE+METHAMPHETAMINE URINE SCREEN: NEGATIVE
ANION GAP SERPL CALCULATED.3IONS-SCNC: 18 MEQ/L (ref 8–16)
AST SERPL-CCNC: 16 U/L (ref 5–40)
BACTERIA: ABNORMAL /HPF
BARBITURATE QUANTITATIVE URINE: NEGATIVE
BASOPHILS # BLD: 0.1 %
BASOPHILS ABSOLUTE: 0 THOU/MM3 (ref 0–0.1)
BENZODIAZEPINE QUANTITATIVE URINE: NEGATIVE
BILIRUB SERPL-MCNC: 0.3 MG/DL (ref 0.3–1.2)
BILIRUBIN DIRECT: < 0.2 MG/DL (ref 0–0.3)
BILIRUBIN URINE: NEGATIVE
BLOOD, URINE: ABNORMAL
BUN BLDV-MCNC: 9 MG/DL (ref 7–22)
CALCIUM SERPL-MCNC: 9.6 MG/DL (ref 8.5–10.5)
CANNABINOID QUANTITATIVE URINE: POSITIVE
CASTS 2: ABNORMAL /LPF
CASTS UA: ABNORMAL /LPF
CHARACTER, URINE: CLEAR
CHLORIDE BLD-SCNC: 103 MEQ/L (ref 98–111)
CO2: 20 MEQ/L (ref 23–33)
COCAINE METABOLITE QUANTITATIVE URINE: NEGATIVE
COLOR: YELLOW
CREAT SERPL-MCNC: 0.6 MG/DL (ref 0.4–1.2)
CRYSTALS, UA: ABNORMAL
EOSINOPHIL # BLD: 0.4 %
EOSINOPHILS ABSOLUTE: 0.1 THOU/MM3 (ref 0–0.4)
EPITHELIAL CELLS, UA: ABNORMAL /HPF
ERYTHROCYTE [DISTWIDTH] IN BLOOD BY AUTOMATED COUNT: 12.2 % (ref 11.5–14.5)
ERYTHROCYTE [DISTWIDTH] IN BLOOD BY AUTOMATED COUNT: 42.2 FL (ref 35–45)
GFR SERPL CREATININE-BSD FRML MDRD: > 90 ML/MIN/1.73M2
GLUCOSE BLD-MCNC: 100 MG/DL (ref 70–108)
GLUCOSE URINE: NEGATIVE MG/DL
HCG,BETA SUBUNIT,QUAL,SERUM: ABNORMAL MIU/ML (ref 0–5)
HCT VFR BLD CALC: 42 % (ref 37–47)
HEMOGLOBIN: 14.2 GM/DL (ref 12–16)
IMMATURE GRANS (ABS): 0.11 THOU/MM3 (ref 0–0.07)
IMMATURE GRANULOCYTES: 0.8 %
KETONES, URINE: >= 160
LEUKOCYTE ESTERASE, URINE: ABNORMAL
LIPASE: 22.3 U/L (ref 5.6–51.3)
LYMPHOCYTES # BLD: 19.8 %
LYMPHOCYTES ABSOLUTE: 2.7 THOU/MM3 (ref 1–4.8)
MCH RBC QN AUTO: 31.8 PG (ref 26–33)
MCHC RBC AUTO-ENTMCNC: 33.8 GM/DL (ref 32.2–35.5)
MCV RBC AUTO: 94 FL (ref 81–99)
MISCELLANEOUS 2: ABNORMAL
MONOCYTES # BLD: 5.6 %
MONOCYTES ABSOLUTE: 0.8 THOU/MM3 (ref 0.4–1.3)
MUCUS: ABNORMAL
NITRITE, URINE: NEGATIVE
NUCLEATED RED BLOOD CELLS: 0 /100 WBC
OPIATES, URINE: NEGATIVE
ORGANISM: ABNORMAL
OSMOLALITY CALCULATION: 280 MOSMOL/KG (ref 275–300)
OXYCODONE: NEGATIVE
PH UA: 7 (ref 5–9)
PHENCYCLIDINE QUANTITATIVE URINE: NEGATIVE
PLATELET # BLD: 225 THOU/MM3 (ref 130–400)
PMV BLD AUTO: 10.6 FL (ref 9.4–12.4)
POTASSIUM SERPL-SCNC: 3.9 MEQ/L (ref 3.5–5.2)
PROTEIN UA: 30
RBC # BLD: 4.47 MILL/MM3 (ref 4.2–5.4)
RBC URINE: ABNORMAL /HPF
RENAL EPITHELIAL, UA: ABNORMAL
SEG NEUTROPHILS: 73.3 %
SEGMENTED NEUTROPHILS ABSOLUTE COUNT: 10 THOU/MM3 (ref 1.8–7.7)
SODIUM BLD-SCNC: 141 MEQ/L (ref 135–145)
SPECIFIC GRAVITY, URINE: 1.02 (ref 1–1.03)
TOTAL PROTEIN: 7.4 G/DL (ref 6.1–8)
URINE CULTURE REFLEX: ABNORMAL
UROBILINOGEN, URINE: 1 EU/DL (ref 0–1)
WBC # BLD: 13.6 THOU/MM3 (ref 4.8–10.8)
WBC UA: ABNORMAL /HPF
YEAST: ABNORMAL

## 2019-12-01 PROCEDURE — 76801 OB US < 14 WKS SINGLE FETUS: CPT

## 2019-12-01 PROCEDURE — 6370000000 HC RX 637 (ALT 250 FOR IP): Performed by: PHYSICIAN ASSISTANT

## 2019-12-01 PROCEDURE — 1200000003 HC TELEMETRY R&B

## 2019-12-01 PROCEDURE — 6360000002 HC RX W HCPCS: Performed by: PHYSICIAN ASSISTANT

## 2019-12-01 PROCEDURE — 80307 DRUG TEST PRSMV CHEM ANLYZR: CPT

## 2019-12-01 PROCEDURE — 76770 US EXAM ABDO BACK WALL COMP: CPT

## 2019-12-01 PROCEDURE — 80053 COMPREHEN METABOLIC PANEL: CPT

## 2019-12-01 PROCEDURE — 36415 COLL VENOUS BLD VENIPUNCTURE: CPT

## 2019-12-01 PROCEDURE — 99223 1ST HOSP IP/OBS HIGH 75: CPT | Performed by: FAMILY MEDICINE

## 2019-12-01 PROCEDURE — 85025 COMPLETE CBC W/AUTO DIFF WBC: CPT

## 2019-12-01 PROCEDURE — 87086 URINE CULTURE/COLONY COUNT: CPT

## 2019-12-01 PROCEDURE — 84702 CHORIONIC GONADOTROPIN TEST: CPT

## 2019-12-01 PROCEDURE — 83690 ASSAY OF LIPASE: CPT

## 2019-12-01 PROCEDURE — 81001 URINALYSIS AUTO W/SCOPE: CPT

## 2019-12-01 PROCEDURE — 82248 BILIRUBIN DIRECT: CPT

## 2019-12-01 PROCEDURE — 2580000003 HC RX 258: Performed by: PHYSICIAN ASSISTANT

## 2019-12-01 PROCEDURE — 99285 EMERGENCY DEPT VISIT HI MDM: CPT

## 2019-12-01 RX ORDER — ONDANSETRON 2 MG/ML
4 INJECTION INTRAMUSCULAR; INTRAVENOUS EVERY 6 HOURS PRN
Status: DISCONTINUED | OUTPATIENT
Start: 2019-12-01 | End: 2019-12-04 | Stop reason: HOSPADM

## 2019-12-01 RX ORDER — ACETAMINOPHEN 325 MG/1
650 TABLET ORAL EVERY 4 HOURS PRN
Status: DISCONTINUED | OUTPATIENT
Start: 2019-12-01 | End: 2019-12-02

## 2019-12-01 RX ORDER — ONDANSETRON 4 MG/1
4 TABLET, ORALLY DISINTEGRATING ORAL EVERY 8 HOURS PRN
Status: DISCONTINUED | OUTPATIENT
Start: 2019-12-01 | End: 2019-12-01

## 2019-12-01 RX ORDER — SODIUM CHLORIDE 9 MG/ML
INJECTION, SOLUTION INTRAVENOUS CONTINUOUS
Status: DISCONTINUED | OUTPATIENT
Start: 2019-12-01 | End: 2019-12-03

## 2019-12-01 RX ORDER — 0.9 % SODIUM CHLORIDE 0.9 %
1000 INTRAVENOUS SOLUTION INTRAVENOUS ONCE
Status: COMPLETED | OUTPATIENT
Start: 2019-12-01 | End: 2019-12-01

## 2019-12-01 RX ORDER — SODIUM CHLORIDE 0.9 % (FLUSH) 0.9 %
10 SYRINGE (ML) INJECTION PRN
Status: DISCONTINUED | OUTPATIENT
Start: 2019-12-01 | End: 2019-12-04 | Stop reason: HOSPADM

## 2019-12-01 RX ORDER — ACETAMINOPHEN 325 MG/1
650 TABLET ORAL ONCE
Status: COMPLETED | OUTPATIENT
Start: 2019-12-01 | End: 2019-12-01

## 2019-12-01 RX ORDER — METOCLOPRAMIDE HYDROCHLORIDE 5 MG/ML
10 INJECTION INTRAMUSCULAR; INTRAVENOUS ONCE
Status: COMPLETED | OUTPATIENT
Start: 2019-12-01 | End: 2019-12-01

## 2019-12-01 RX ORDER — SODIUM CHLORIDE 0.9 % (FLUSH) 0.9 %
10 SYRINGE (ML) INJECTION EVERY 12 HOURS SCHEDULED
Status: DISCONTINUED | OUTPATIENT
Start: 2019-12-01 | End: 2019-12-04 | Stop reason: HOSPADM

## 2019-12-01 RX ORDER — CEFAZOLIN SODIUM 1 G/50ML
1 INJECTION, SOLUTION INTRAVENOUS EVERY 8 HOURS
Status: DISCONTINUED | OUTPATIENT
Start: 2019-12-02 | End: 2019-12-04 | Stop reason: HOSPADM

## 2019-12-01 RX ADMIN — SODIUM CHLORIDE: 9 INJECTION, SOLUTION INTRAVENOUS at 21:27

## 2019-12-01 RX ADMIN — SODIUM CHLORIDE 1000 ML: 9 INJECTION, SOLUTION INTRAVENOUS at 19:10

## 2019-12-01 RX ADMIN — ACETAMINOPHEN 650 MG: 325 TABLET ORAL at 21:27

## 2019-12-01 RX ADMIN — CEFAZOLIN 1 G: 1 INJECTION, POWDER, FOR SOLUTION INTRAMUSCULAR; INTRAVENOUS at 23:14

## 2019-12-01 RX ADMIN — METOCLOPRAMIDE 10 MG: 5 INJECTION, SOLUTION INTRAMUSCULAR; INTRAVENOUS at 21:27

## 2019-12-01 ASSESSMENT — PAIN DESCRIPTION - FREQUENCY
FREQUENCY: CONTINUOUS

## 2019-12-01 ASSESSMENT — PAIN DESCRIPTION - ORIENTATION
ORIENTATION: RIGHT;LEFT;LOWER
ORIENTATION: LEFT;RIGHT
ORIENTATION: RIGHT;LEFT;LOWER
ORIENTATION: RIGHT;LEFT;LOWER

## 2019-12-01 ASSESSMENT — ENCOUNTER SYMPTOMS
CONSTIPATION: 0
SHORTNESS OF BREATH: 0
VOMITING: 1
BACK PAIN: 0
NAUSEA: 1
DIARRHEA: 0
ABDOMINAL PAIN: 1
COLOR CHANGE: 0

## 2019-12-01 ASSESSMENT — PAIN DESCRIPTION - PROGRESSION: CLINICAL_PROGRESSION: GRADUALLY WORSENING

## 2019-12-01 ASSESSMENT — PAIN - FUNCTIONAL ASSESSMENT: PAIN_FUNCTIONAL_ASSESSMENT: ACTIVITIES ARE NOT PREVENTED

## 2019-12-01 ASSESSMENT — PAIN DESCRIPTION - DESCRIPTORS
DESCRIPTORS: CRAMPING;DISCOMFORT
DESCRIPTORS: SHARP
DESCRIPTORS: CRAMPING;DISCOMFORT
DESCRIPTORS: CRAMPING

## 2019-12-01 ASSESSMENT — PAIN DESCRIPTION - LOCATION
LOCATION: ABDOMEN

## 2019-12-01 ASSESSMENT — PAIN SCALES - GENERAL
PAINLEVEL_OUTOF10: 3
PAINLEVEL_OUTOF10: 7
PAINLEVEL_OUTOF10: 3
PAINLEVEL_OUTOF10: 7
PAINLEVEL_OUTOF10: 10

## 2019-12-01 ASSESSMENT — PAIN DESCRIPTION - PAIN TYPE
TYPE: ACUTE PAIN

## 2019-12-01 ASSESSMENT — PAIN DESCRIPTION - ONSET: ONSET: ON-GOING

## 2019-12-02 ENCOUNTER — ANESTHESIA EVENT (OUTPATIENT)
Dept: OPERATING ROOM | Age: 24
DRG: 818 | End: 2019-12-02
Payer: COMMERCIAL

## 2019-12-02 ENCOUNTER — APPOINTMENT (OUTPATIENT)
Dept: ULTRASOUND IMAGING | Age: 24
DRG: 818 | End: 2019-12-02
Payer: COMMERCIAL

## 2019-12-02 ENCOUNTER — TELEPHONE (OUTPATIENT)
Dept: UROLOGY | Age: 24
End: 2019-12-02

## 2019-12-02 ENCOUNTER — ANESTHESIA (OUTPATIENT)
Dept: OPERATING ROOM | Age: 24
DRG: 818 | End: 2019-12-02
Payer: COMMERCIAL

## 2019-12-02 VITALS — DIASTOLIC BLOOD PRESSURE: 71 MMHG | SYSTOLIC BLOOD PRESSURE: 108 MMHG | OXYGEN SATURATION: 97 %

## 2019-12-02 LAB
ALBUMIN SERPL-MCNC: 3.1 G/DL (ref 3.5–5.1)
ALP BLD-CCNC: 27 U/L (ref 38–126)
ALT SERPL-CCNC: 8 U/L (ref 11–66)
ANION GAP SERPL CALCULATED.3IONS-SCNC: 13 MEQ/L (ref 8–16)
AST SERPL-CCNC: 12 U/L (ref 5–40)
BASOPHILS # BLD: 0.1 %
BASOPHILS ABSOLUTE: 0 THOU/MM3 (ref 0–0.1)
BILIRUB SERPL-MCNC: 0.3 MG/DL (ref 0.3–1.2)
BUN BLDV-MCNC: 6 MG/DL (ref 7–22)
CALCIUM SERPL-MCNC: 8.3 MG/DL (ref 8.5–10.5)
CHLORIDE BLD-SCNC: 109 MEQ/L (ref 98–111)
CO2: 17 MEQ/L (ref 23–33)
CREAT SERPL-MCNC: 0.4 MG/DL (ref 0.4–1.2)
EOSINOPHIL # BLD: 0.3 %
EOSINOPHILS ABSOLUTE: 0 THOU/MM3 (ref 0–0.4)
ERYTHROCYTE [DISTWIDTH] IN BLOOD BY AUTOMATED COUNT: 12.3 % (ref 11.5–14.5)
ERYTHROCYTE [DISTWIDTH] IN BLOOD BY AUTOMATED COUNT: 42.3 FL (ref 35–45)
GFR SERPL CREATININE-BSD FRML MDRD: > 90 ML/MIN/1.73M2
GLUCOSE BLD-MCNC: 92 MG/DL (ref 70–108)
HCT VFR BLD CALC: 33.3 % (ref 37–47)
HEMOGLOBIN: 11.3 GM/DL (ref 12–16)
IMMATURE GRANS (ABS): 0.05 THOU/MM3 (ref 0–0.07)
IMMATURE GRANULOCYTES: 0.6 %
INR BLD: 1.09 (ref 0.85–1.13)
LYMPHOCYTES # BLD: 30.2 %
LYMPHOCYTES ABSOLUTE: 2.7 THOU/MM3 (ref 1–4.8)
MAGNESIUM: 1.7 MG/DL (ref 1.6–2.4)
MCH RBC QN AUTO: 32 PG (ref 26–33)
MCHC RBC AUTO-ENTMCNC: 33.9 GM/DL (ref 32.2–35.5)
MCV RBC AUTO: 94.3 FL (ref 81–99)
MONOCYTES # BLD: 5.5 %
MONOCYTES ABSOLUTE: 0.5 THOU/MM3 (ref 0.4–1.3)
NUCLEATED RED BLOOD CELLS: 0 /100 WBC
OSMOLALITY CALCULATION: 274.8 MOSMOL/KG (ref 275–300)
PLATELET # BLD: 186 THOU/MM3 (ref 130–400)
PMV BLD AUTO: 10.5 FL (ref 9.4–12.4)
POTASSIUM REFLEX MAGNESIUM: 3.4 MEQ/L (ref 3.5–5.2)
RBC # BLD: 3.53 MILL/MM3 (ref 4.2–5.4)
SEG NEUTROPHILS: 63.3 %
SEGMENTED NEUTROPHILS ABSOLUTE COUNT: 5.7 THOU/MM3 (ref 1.8–7.7)
SODIUM BLD-SCNC: 139 MEQ/L (ref 135–145)
TOTAL PROTEIN: 5.5 G/DL (ref 6.1–8)
WBC # BLD: 9 THOU/MM3 (ref 4.8–10.8)

## 2019-12-02 PROCEDURE — 36415 COLL VENOUS BLD VENIPUNCTURE: CPT

## 2019-12-02 PROCEDURE — 6360000002 HC RX W HCPCS: Performed by: ANESTHESIOLOGY

## 2019-12-02 PROCEDURE — 83735 ASSAY OF MAGNESIUM: CPT

## 2019-12-02 PROCEDURE — C1769 GUIDE WIRE: HCPCS | Performed by: UROLOGY

## 2019-12-02 PROCEDURE — 6370000000 HC RX 637 (ALT 250 FOR IP): Performed by: FAMILY MEDICINE

## 2019-12-02 PROCEDURE — 2709999900 HC NON-CHARGEABLE SUPPLY

## 2019-12-02 PROCEDURE — 85610 PROTHROMBIN TIME: CPT

## 2019-12-02 PROCEDURE — 3600000013 HC SURGERY LEVEL 3 ADDTL 15MIN: Performed by: UROLOGY

## 2019-12-02 PROCEDURE — 6360000002 HC RX W HCPCS: Performed by: FAMILY MEDICINE

## 2019-12-02 PROCEDURE — 3600000003 HC SURGERY LEVEL 3 BASE: Performed by: UROLOGY

## 2019-12-02 PROCEDURE — 2580000003 HC RX 258: Performed by: FAMILY MEDICINE

## 2019-12-02 PROCEDURE — 1200000003 HC TELEMETRY R&B

## 2019-12-02 PROCEDURE — 99253 IP/OBS CNSLTJ NEW/EST LOW 45: CPT | Performed by: NURSE PRACTITIONER

## 2019-12-02 PROCEDURE — 99232 SBSQ HOSP IP/OBS MODERATE 35: CPT | Performed by: FAMILY MEDICINE

## 2019-12-02 PROCEDURE — 2709999900 HC NON-CHARGEABLE SUPPLY: Performed by: UROLOGY

## 2019-12-02 PROCEDURE — 2500000003 HC RX 250 WO HCPCS: Performed by: ANESTHESIOLOGY

## 2019-12-02 PROCEDURE — C2617 STENT, NON-COR, TEM W/O DEL: HCPCS | Performed by: UROLOGY

## 2019-12-02 PROCEDURE — 85025 COMPLETE CBC W/AUTO DIFF WBC: CPT

## 2019-12-02 PROCEDURE — 0T778DZ DILATION OF LEFT URETER WITH INTRALUMINAL DEVICE, VIA NATURAL OR ARTIFICIAL OPENING ENDOSCOPIC: ICD-10-PCS | Performed by: UROLOGY

## 2019-12-02 PROCEDURE — 80053 COMPREHEN METABOLIC PANEL: CPT

## 2019-12-02 PROCEDURE — 76775 US EXAM ABDO BACK WALL LIM: CPT

## 2019-12-02 PROCEDURE — 3700000000 HC ANESTHESIA ATTENDED CARE: Performed by: UROLOGY

## 2019-12-02 PROCEDURE — 94760 N-INVAS EAR/PLS OXIMETRY 1: CPT

## 2019-12-02 PROCEDURE — 6360000002 HC RX W HCPCS: Performed by: OBSTETRICS & GYNECOLOGY

## 2019-12-02 PROCEDURE — 3700000001 HC ADD 15 MINUTES (ANESTHESIA): Performed by: UROLOGY

## 2019-12-02 DEVICE — URETERAL STENT
Type: IMPLANTABLE DEVICE | Site: URETER | Status: FUNCTIONAL
Brand: PERCUFLEX™ PLUS

## 2019-12-02 RX ORDER — PROPOFOL 10 MG/ML
INJECTION, EMULSION INTRAVENOUS PRN
Status: DISCONTINUED | OUTPATIENT
Start: 2019-12-02 | End: 2019-12-02 | Stop reason: SDUPTHER

## 2019-12-02 RX ORDER — ACETAMINOPHEN 325 MG/1
650 TABLET ORAL EVERY 4 HOURS PRN
Status: DISCONTINUED | OUTPATIENT
Start: 2019-12-02 | End: 2019-12-04 | Stop reason: HOSPADM

## 2019-12-02 RX ORDER — LIDOCAINE HCL/PF 100 MG/5ML
SYRINGE (ML) INJECTION PRN
Status: DISCONTINUED | OUTPATIENT
Start: 2019-12-02 | End: 2019-12-02 | Stop reason: SDUPTHER

## 2019-12-02 RX ORDER — METOCLOPRAMIDE HYDROCHLORIDE 5 MG/ML
10 INJECTION INTRAMUSCULAR; INTRAVENOUS EVERY 6 HOURS PRN
Status: DISCONTINUED | OUTPATIENT
Start: 2019-12-02 | End: 2019-12-04 | Stop reason: HOSPADM

## 2019-12-02 RX ORDER — CEFAZOLIN SODIUM 1 G/3ML
INJECTION, POWDER, FOR SOLUTION INTRAMUSCULAR; INTRAVENOUS PRN
Status: DISCONTINUED | OUTPATIENT
Start: 2019-12-02 | End: 2019-12-02 | Stop reason: SDUPTHER

## 2019-12-02 RX ORDER — POTASSIUM CHLORIDE 7.45 MG/ML
10 INJECTION INTRAVENOUS
Status: COMPLETED | OUTPATIENT
Start: 2019-12-02 | End: 2019-12-02

## 2019-12-02 RX ORDER — MORPHINE SULFATE 2 MG/ML
2 INJECTION, SOLUTION INTRAMUSCULAR; INTRAVENOUS
Status: DISCONTINUED | OUTPATIENT
Start: 2019-12-02 | End: 2019-12-04 | Stop reason: HOSPADM

## 2019-12-02 RX ORDER — FENTANYL CITRATE 50 UG/ML
INJECTION, SOLUTION INTRAMUSCULAR; INTRAVENOUS PRN
Status: DISCONTINUED | OUTPATIENT
Start: 2019-12-02 | End: 2019-12-02 | Stop reason: SDUPTHER

## 2019-12-02 RX ORDER — OXYCODONE HYDROCHLORIDE AND ACETAMINOPHEN 5; 325 MG/1; MG/1
2 TABLET ORAL EVERY 4 HOURS PRN
Status: DISCONTINUED | OUTPATIENT
Start: 2019-12-02 | End: 2019-12-04 | Stop reason: HOSPADM

## 2019-12-02 RX ORDER — OXYCODONE HYDROCHLORIDE AND ACETAMINOPHEN 5; 325 MG/1; MG/1
1 TABLET ORAL EVERY 4 HOURS PRN
Status: DISCONTINUED | OUTPATIENT
Start: 2019-12-02 | End: 2019-12-04 | Stop reason: HOSPADM

## 2019-12-02 RX ADMIN — POTASSIUM CHLORIDE 10 MEQ: 7.46 INJECTION, SOLUTION INTRAVENOUS at 17:11

## 2019-12-02 RX ADMIN — METOCLOPRAMIDE 10 MG: 5 INJECTION, SOLUTION INTRAMUSCULAR; INTRAVENOUS at 20:34

## 2019-12-02 RX ADMIN — CEFAZOLIN SODIUM 1 G: 1 INJECTION, SOLUTION INTRAVENOUS at 15:49

## 2019-12-02 RX ADMIN — PROPOFOL 30 MG: 10 INJECTION, EMULSION INTRAVENOUS at 13:32

## 2019-12-02 RX ADMIN — CEFAZOLIN SODIUM 1 G: 1 INJECTION, SOLUTION INTRAVENOUS at 22:13

## 2019-12-02 RX ADMIN — MORPHINE SULFATE 2 MG: 2 INJECTION, SOLUTION INTRAMUSCULAR; INTRAVENOUS at 17:07

## 2019-12-02 RX ADMIN — POTASSIUM CHLORIDE 10 MEQ: 7.46 INJECTION, SOLUTION INTRAVENOUS at 14:49

## 2019-12-02 RX ADMIN — SODIUM CHLORIDE: 9 INJECTION, SOLUTION INTRAVENOUS at 01:13

## 2019-12-02 RX ADMIN — Medication 10 ML: at 00:22

## 2019-12-02 RX ADMIN — POTASSIUM CHLORIDE 10 MEQ: 7.46 INJECTION, SOLUTION INTRAVENOUS at 19:17

## 2019-12-02 RX ADMIN — MORPHINE SULFATE 2 MG: 2 INJECTION, SOLUTION INTRAMUSCULAR; INTRAVENOUS at 19:23

## 2019-12-02 RX ADMIN — Medication 40 MG: at 13:26

## 2019-12-02 RX ADMIN — PROPOFOL 30 MG: 10 INJECTION, EMULSION INTRAVENOUS at 13:24

## 2019-12-02 RX ADMIN — PROPOFOL 30 MG: 10 INJECTION, EMULSION INTRAVENOUS at 13:30

## 2019-12-02 RX ADMIN — ONDANSETRON 4 MG: 2 INJECTION INTRAMUSCULAR; INTRAVENOUS at 15:44

## 2019-12-02 RX ADMIN — SODIUM CHLORIDE: 9 INJECTION, SOLUTION INTRAVENOUS at 11:34

## 2019-12-02 RX ADMIN — POTASSIUM CHLORIDE 10 MEQ: 7.46 INJECTION, SOLUTION INTRAVENOUS at 22:11

## 2019-12-02 RX ADMIN — CEFAZOLIN 1000 MG: 1 INJECTION, POWDER, FOR SOLUTION INTRAMUSCULAR; INTRAVENOUS; PARENTERAL at 13:30

## 2019-12-02 RX ADMIN — ONDANSETRON 4 MG: 2 INJECTION INTRAMUSCULAR; INTRAVENOUS at 00:19

## 2019-12-02 RX ADMIN — ACETAMINOPHEN 650 MG: 325 TABLET ORAL at 01:27

## 2019-12-02 RX ADMIN — FENTANYL CITRATE 100 MCG: 50 INJECTION INTRAMUSCULAR; INTRAVENOUS at 13:24

## 2019-12-02 RX ADMIN — CEFAZOLIN SODIUM 1 G: 1 INJECTION, SOLUTION INTRAVENOUS at 06:20

## 2019-12-02 RX ADMIN — PROPOFOL 30 MG: 10 INJECTION, EMULSION INTRAVENOUS at 13:28

## 2019-12-02 ASSESSMENT — PAIN DESCRIPTION - PROGRESSION
CLINICAL_PROGRESSION: NOT CHANGED

## 2019-12-02 ASSESSMENT — PAIN SCALES - GENERAL
PAINLEVEL_OUTOF10: 10
PAINLEVEL_OUTOF10: 0
PAINLEVEL_OUTOF10: 8
PAINLEVEL_OUTOF10: 5
PAINLEVEL_OUTOF10: 10
PAINLEVEL_OUTOF10: 0
PAINLEVEL_OUTOF10: 6
PAINLEVEL_OUTOF10: 5
PAINLEVEL_OUTOF10: 10
PAINLEVEL_OUTOF10: 8

## 2019-12-02 ASSESSMENT — PAIN - FUNCTIONAL ASSESSMENT
PAIN_FUNCTIONAL_ASSESSMENT: ACTIVITIES ARE NOT PREVENTED

## 2019-12-02 ASSESSMENT — PULMONARY FUNCTION TESTS
PIF_VALUE: 1
PIF_VALUE: 0
PIF_VALUE: 1
PIF_VALUE: 0
PIF_VALUE: 1

## 2019-12-02 ASSESSMENT — PAIN DESCRIPTION - ONSET
ONSET: ON-GOING

## 2019-12-02 ASSESSMENT — PAIN DESCRIPTION - DESCRIPTORS
DESCRIPTORS: SHARP
DESCRIPTORS: SHARP
DESCRIPTORS: ACHING

## 2019-12-02 ASSESSMENT — PAIN DESCRIPTION - PAIN TYPE
TYPE: ACUTE PAIN;SURGICAL PAIN

## 2019-12-02 ASSESSMENT — PAIN DESCRIPTION - LOCATION
LOCATION: ABDOMEN

## 2019-12-02 ASSESSMENT — PAIN DESCRIPTION - ORIENTATION
ORIENTATION: LOWER
ORIENTATION: LEFT;LOWER
ORIENTATION: LEFT;LOWER

## 2019-12-02 ASSESSMENT — PAIN DESCRIPTION - FREQUENCY
FREQUENCY: CONTINUOUS

## 2019-12-03 ENCOUNTER — TELEPHONE (OUTPATIENT)
Dept: UROLOGY | Age: 24
End: 2019-12-03

## 2019-12-03 DIAGNOSIS — Z01.818 PRE-OP TESTING: ICD-10-CM

## 2019-12-03 DIAGNOSIS — N20.0 KIDNEY STONE: Primary | ICD-10-CM

## 2019-12-03 DIAGNOSIS — Z3A.13 13 WEEKS GESTATION OF PREGNANCY: ICD-10-CM

## 2019-12-03 LAB
ANION GAP SERPL CALCULATED.3IONS-SCNC: 17 MEQ/L (ref 8–16)
BASOPHILS # BLD: 0.2 %
BASOPHILS ABSOLUTE: 0 THOU/MM3 (ref 0–0.1)
BUN BLDV-MCNC: 3 MG/DL (ref 7–22)
CALCIUM SERPL-MCNC: 8.5 MG/DL (ref 8.5–10.5)
CHLORIDE BLD-SCNC: 103 MEQ/L (ref 98–111)
CO2: 17 MEQ/L (ref 23–33)
CREAT SERPL-MCNC: 0.4 MG/DL (ref 0.4–1.2)
EOSINOPHIL # BLD: 0.5 %
EOSINOPHILS ABSOLUTE: 0 THOU/MM3 (ref 0–0.4)
ERYTHROCYTE [DISTWIDTH] IN BLOOD BY AUTOMATED COUNT: 12.3 % (ref 11.5–14.5)
ERYTHROCYTE [DISTWIDTH] IN BLOOD BY AUTOMATED COUNT: 42.3 FL (ref 35–45)
GFR SERPL CREATININE-BSD FRML MDRD: > 90 ML/MIN/1.73M2
GLUCOSE BLD-MCNC: 83 MG/DL (ref 70–108)
HCT VFR BLD CALC: 34.8 % (ref 37–47)
HEMOGLOBIN: 11.6 GM/DL (ref 12–16)
IMMATURE GRANS (ABS): 0.04 THOU/MM3 (ref 0–0.07)
IMMATURE GRANULOCYTES: 0.5 %
LYMPHOCYTES # BLD: 27.9 %
LYMPHOCYTES ABSOLUTE: 2.3 THOU/MM3 (ref 1–4.8)
MAGNESIUM: 1.6 MG/DL (ref 1.6–2.4)
MCH RBC QN AUTO: 31.4 PG (ref 26–33)
MCHC RBC AUTO-ENTMCNC: 33.3 GM/DL (ref 32.2–35.5)
MCV RBC AUTO: 94.1 FL (ref 81–99)
MONOCYTES # BLD: 6.1 %
MONOCYTES ABSOLUTE: 0.5 THOU/MM3 (ref 0.4–1.3)
NUCLEATED RED BLOOD CELLS: 0 /100 WBC
ORGANISM: ABNORMAL
PLATELET # BLD: 183 THOU/MM3 (ref 130–400)
PMV BLD AUTO: 10.2 FL (ref 9.4–12.4)
POTASSIUM SERPL-SCNC: 3.4 MEQ/L (ref 3.5–5.2)
RBC # BLD: 3.7 MILL/MM3 (ref 4.2–5.4)
SEG NEUTROPHILS: 64.8 %
SEGMENTED NEUTROPHILS ABSOLUTE COUNT: 5.2 THOU/MM3 (ref 1.8–7.7)
SODIUM BLD-SCNC: 137 MEQ/L (ref 135–145)
URINE CULTURE REFLEX: ABNORMAL
WBC # BLD: 8.1 THOU/MM3 (ref 4.8–10.8)

## 2019-12-03 PROCEDURE — 6360000002 HC RX W HCPCS: Performed by: FAMILY MEDICINE

## 2019-12-03 PROCEDURE — 83735 ASSAY OF MAGNESIUM: CPT

## 2019-12-03 PROCEDURE — 2580000003 HC RX 258: Performed by: FAMILY MEDICINE

## 2019-12-03 PROCEDURE — 6370000000 HC RX 637 (ALT 250 FOR IP): Performed by: OBSTETRICS & GYNECOLOGY

## 2019-12-03 PROCEDURE — 2709999900 HC NON-CHARGEABLE SUPPLY

## 2019-12-03 PROCEDURE — 1200000003 HC TELEMETRY R&B

## 2019-12-03 PROCEDURE — 6370000000 HC RX 637 (ALT 250 FOR IP): Performed by: FAMILY MEDICINE

## 2019-12-03 PROCEDURE — 6370000000 HC RX 637 (ALT 250 FOR IP): Performed by: NURSE PRACTITIONER

## 2019-12-03 PROCEDURE — 6360000002 HC RX W HCPCS: Performed by: OBSTETRICS & GYNECOLOGY

## 2019-12-03 PROCEDURE — 85025 COMPLETE CBC W/AUTO DIFF WBC: CPT

## 2019-12-03 PROCEDURE — 99232 SBSQ HOSP IP/OBS MODERATE 35: CPT | Performed by: NURSE PRACTITIONER

## 2019-12-03 PROCEDURE — 36415 COLL VENOUS BLD VENIPUNCTURE: CPT

## 2019-12-03 PROCEDURE — 99232 SBSQ HOSP IP/OBS MODERATE 35: CPT | Performed by: FAMILY MEDICINE

## 2019-12-03 PROCEDURE — 80048 BASIC METABOLIC PNL TOTAL CA: CPT

## 2019-12-03 RX ORDER — OXYBUTYNIN CHLORIDE 5 MG/1
5 TABLET ORAL EVERY 8 HOURS PRN
Status: DISCONTINUED | OUTPATIENT
Start: 2019-12-03 | End: 2019-12-04 | Stop reason: HOSPADM

## 2019-12-03 RX ORDER — TAMSULOSIN HYDROCHLORIDE 0.4 MG/1
0.4 CAPSULE ORAL DAILY
Status: DISCONTINUED | OUTPATIENT
Start: 2019-12-03 | End: 2019-12-04 | Stop reason: HOSPADM

## 2019-12-03 RX ORDER — SODIUM CHLORIDE, SODIUM LACTATE, POTASSIUM CHLORIDE, CALCIUM CHLORIDE 600; 310; 30; 20 MG/100ML; MG/100ML; MG/100ML; MG/100ML
INJECTION, SOLUTION INTRAVENOUS CONTINUOUS
Status: DISCONTINUED | OUTPATIENT
Start: 2019-12-03 | End: 2019-12-04

## 2019-12-03 RX ORDER — POTASSIUM CHLORIDE 750 MG/1
40 TABLET, FILM COATED, EXTENDED RELEASE ORAL ONCE
Status: COMPLETED | OUTPATIENT
Start: 2019-12-03 | End: 2019-12-03

## 2019-12-03 RX ADMIN — OXYCODONE HYDROCHLORIDE AND ACETAMINOPHEN 2 TABLET: 5; 325 TABLET ORAL at 11:43

## 2019-12-03 RX ADMIN — CEFAZOLIN SODIUM 1 G: 1 INJECTION, SOLUTION INTRAVENOUS at 15:09

## 2019-12-03 RX ADMIN — CEFAZOLIN SODIUM 1 G: 1 INJECTION, SOLUTION INTRAVENOUS at 23:07

## 2019-12-03 RX ADMIN — OXYBUTYNIN CHLORIDE 5 MG: 5 TABLET ORAL at 15:11

## 2019-12-03 RX ADMIN — MORPHINE SULFATE 2 MG: 2 INJECTION, SOLUTION INTRAMUSCULAR; INTRAVENOUS at 08:04

## 2019-12-03 RX ADMIN — CEFAZOLIN SODIUM 1 G: 1 INJECTION, SOLUTION INTRAVENOUS at 05:49

## 2019-12-03 RX ADMIN — SODIUM CHLORIDE, POTASSIUM CHLORIDE, SODIUM LACTATE AND CALCIUM CHLORIDE: 600; 310; 30; 20 INJECTION, SOLUTION INTRAVENOUS at 22:59

## 2019-12-03 RX ADMIN — METOCLOPRAMIDE 10 MG: 5 INJECTION, SOLUTION INTRAMUSCULAR; INTRAVENOUS at 08:10

## 2019-12-03 RX ADMIN — SODIUM CHLORIDE: 9 INJECTION, SOLUTION INTRAVENOUS at 05:50

## 2019-12-03 RX ADMIN — OXYCODONE HYDROCHLORIDE AND ACETAMINOPHEN 2 TABLET: 5; 325 TABLET ORAL at 21:17

## 2019-12-03 RX ADMIN — METOCLOPRAMIDE 10 MG: 5 INJECTION, SOLUTION INTRAMUSCULAR; INTRAVENOUS at 15:10

## 2019-12-03 RX ADMIN — METOCLOPRAMIDE 10 MG: 5 INJECTION, SOLUTION INTRAMUSCULAR; INTRAVENOUS at 21:18

## 2019-12-03 RX ADMIN — POTASSIUM CHLORIDE 40 MEQ: 750 TABLET, FILM COATED, EXTENDED RELEASE ORAL at 11:43

## 2019-12-03 RX ADMIN — TAMSULOSIN HYDROCHLORIDE 0.4 MG: 0.4 CAPSULE ORAL at 15:11

## 2019-12-03 RX ADMIN — SODIUM CHLORIDE, POTASSIUM CHLORIDE, SODIUM LACTATE AND CALCIUM CHLORIDE: 600; 310; 30; 20 INJECTION, SOLUTION INTRAVENOUS at 22:58

## 2019-12-03 RX ADMIN — SODIUM CHLORIDE, POTASSIUM CHLORIDE, SODIUM LACTATE AND CALCIUM CHLORIDE: 600; 310; 30; 20 INJECTION, SOLUTION INTRAVENOUS at 11:48

## 2019-12-03 RX ADMIN — MORPHINE SULFATE 2 MG: 2 INJECTION, SOLUTION INTRAMUSCULAR; INTRAVENOUS at 00:33

## 2019-12-03 ASSESSMENT — PAIN DESCRIPTION - PROGRESSION
CLINICAL_PROGRESSION: NOT CHANGED

## 2019-12-03 ASSESSMENT — PAIN DESCRIPTION - PAIN TYPE
TYPE: ACUTE PAIN;SURGICAL PAIN
TYPE: ACUTE PAIN

## 2019-12-03 ASSESSMENT — PAIN SCALES - GENERAL
PAINLEVEL_OUTOF10: 1
PAINLEVEL_OUTOF10: 7
PAINLEVEL_OUTOF10: 9
PAINLEVEL_OUTOF10: 5
PAINLEVEL_OUTOF10: 8
PAINLEVEL_OUTOF10: 6
PAINLEVEL_OUTOF10: 9
PAINLEVEL_OUTOF10: 8
PAINLEVEL_OUTOF10: 8

## 2019-12-03 ASSESSMENT — PAIN DESCRIPTION - DESCRIPTORS
DESCRIPTORS: ACHING
DESCRIPTORS: ACHING

## 2019-12-03 ASSESSMENT — PAIN DESCRIPTION - FREQUENCY
FREQUENCY: CONTINUOUS
FREQUENCY: CONTINUOUS

## 2019-12-03 ASSESSMENT — PAIN DESCRIPTION - ONSET
ONSET: ON-GOING
ONSET: ON-GOING

## 2019-12-03 ASSESSMENT — PAIN DESCRIPTION - LOCATION
LOCATION: ABDOMEN
LOCATION: ABDOMEN

## 2019-12-03 ASSESSMENT — PAIN - FUNCTIONAL ASSESSMENT
PAIN_FUNCTIONAL_ASSESSMENT: ACTIVITIES ARE NOT PREVENTED
PAIN_FUNCTIONAL_ASSESSMENT: ACTIVITIES ARE NOT PREVENTED

## 2019-12-03 ASSESSMENT — PAIN DESCRIPTION - ORIENTATION
ORIENTATION: LOWER
ORIENTATION: LOWER

## 2019-12-03 ASSESSMENT — PAIN DESCRIPTION - DIRECTION: RADIATING_TOWARDS: NO

## 2019-12-04 VITALS
WEIGHT: 136.2 LBS | SYSTOLIC BLOOD PRESSURE: 103 MMHG | BODY MASS INDEX: 25.06 KG/M2 | TEMPERATURE: 98.4 F | HEIGHT: 62 IN | DIASTOLIC BLOOD PRESSURE: 63 MMHG | HEART RATE: 93 BPM | OXYGEN SATURATION: 97 % | RESPIRATION RATE: 15 BRPM

## 2019-12-04 LAB
ANION GAP SERPL CALCULATED.3IONS-SCNC: 18 MEQ/L (ref 8–16)
BASOPHILS # BLD: 0.2 %
BASOPHILS ABSOLUTE: 0 THOU/MM3 (ref 0–0.1)
BUN BLDV-MCNC: 4 MG/DL (ref 7–22)
CALCIUM SERPL-MCNC: 8.6 MG/DL (ref 8.5–10.5)
CHLORIDE BLD-SCNC: 102 MEQ/L (ref 98–111)
CO2: 14 MEQ/L (ref 23–33)
CREAT SERPL-MCNC: 0.4 MG/DL (ref 0.4–1.2)
EOSINOPHIL # BLD: 0.4 %
EOSINOPHILS ABSOLUTE: 0 THOU/MM3 (ref 0–0.4)
ERYTHROCYTE [DISTWIDTH] IN BLOOD BY AUTOMATED COUNT: 12.1 % (ref 11.5–14.5)
ERYTHROCYTE [DISTWIDTH] IN BLOOD BY AUTOMATED COUNT: 41.6 FL (ref 35–45)
GFR SERPL CREATININE-BSD FRML MDRD: > 90 ML/MIN/1.73M2
GLUCOSE BLD-MCNC: 69 MG/DL (ref 70–108)
HCT VFR BLD CALC: 35.3 % (ref 37–47)
HEMOGLOBIN: 11.8 GM/DL (ref 12–16)
IMMATURE GRANS (ABS): 0.04 THOU/MM3 (ref 0–0.07)
IMMATURE GRANULOCYTES: 0.5 %
LYMPHOCYTES # BLD: 25.4 %
LYMPHOCYTES ABSOLUTE: 2.1 THOU/MM3 (ref 1–4.8)
MAGNESIUM: 1.6 MG/DL (ref 1.6–2.4)
MCH RBC QN AUTO: 31.5 PG (ref 26–33)
MCHC RBC AUTO-ENTMCNC: 33.4 GM/DL (ref 32.2–35.5)
MCV RBC AUTO: 94.1 FL (ref 81–99)
MONOCYTES # BLD: 6.3 %
MONOCYTES ABSOLUTE: 0.5 THOU/MM3 (ref 0.4–1.3)
NUCLEATED RED BLOOD CELLS: 0 /100 WBC
PLATELET # BLD: 196 THOU/MM3 (ref 130–400)
PMV BLD AUTO: 10.8 FL (ref 9.4–12.4)
POTASSIUM SERPL-SCNC: 3.7 MEQ/L (ref 3.5–5.2)
RBC # BLD: 3.75 MILL/MM3 (ref 4.2–5.4)
SEG NEUTROPHILS: 67.2 %
SEGMENTED NEUTROPHILS ABSOLUTE COUNT: 5.4 THOU/MM3 (ref 1.8–7.7)
SODIUM BLD-SCNC: 134 MEQ/L (ref 135–145)
WBC # BLD: 8.1 THOU/MM3 (ref 4.8–10.8)

## 2019-12-04 PROCEDURE — 6370000000 HC RX 637 (ALT 250 FOR IP): Performed by: OBSTETRICS & GYNECOLOGY

## 2019-12-04 PROCEDURE — 6360000002 HC RX W HCPCS: Performed by: FAMILY MEDICINE

## 2019-12-04 PROCEDURE — 83735 ASSAY OF MAGNESIUM: CPT

## 2019-12-04 PROCEDURE — 99238 HOSP IP/OBS DSCHRG MGMT 30/<: CPT | Performed by: INTERNAL MEDICINE

## 2019-12-04 PROCEDURE — 2580000003 HC RX 258: Performed by: FAMILY MEDICINE

## 2019-12-04 PROCEDURE — 6370000000 HC RX 637 (ALT 250 FOR IP): Performed by: NURSE PRACTITIONER

## 2019-12-04 PROCEDURE — 85025 COMPLETE CBC W/AUTO DIFF WBC: CPT

## 2019-12-04 PROCEDURE — 36415 COLL VENOUS BLD VENIPUNCTURE: CPT

## 2019-12-04 PROCEDURE — 80048 BASIC METABOLIC PNL TOTAL CA: CPT

## 2019-12-04 RX ORDER — TAMSULOSIN HYDROCHLORIDE 0.4 MG/1
0.4 CAPSULE ORAL DAILY
Qty: 30 CAPSULE | Refills: 0 | Status: SHIPPED | OUTPATIENT
Start: 2019-12-04 | End: 2019-12-15 | Stop reason: ALTCHOICE

## 2019-12-04 RX ORDER — OXYCODONE HYDROCHLORIDE AND ACETAMINOPHEN 5; 325 MG/1; MG/1
1 TABLET ORAL EVERY 4 HOURS PRN
Qty: 10 TABLET | Refills: 0 | Status: SHIPPED | OUTPATIENT
Start: 2019-12-04 | End: 2019-12-07

## 2019-12-04 RX ORDER — METOCLOPRAMIDE 10 MG/1
10 TABLET ORAL EVERY 6 HOURS PRN
Qty: 12 TABLET | Refills: 3 | Status: SHIPPED | OUTPATIENT
Start: 2019-12-04 | End: 2020-02-24

## 2019-12-04 RX ORDER — OXYBUTYNIN CHLORIDE 5 MG/1
5 TABLET ORAL EVERY 8 HOURS PRN
Qty: 30 TABLET | Refills: 1 | Status: SHIPPED | OUTPATIENT
Start: 2019-12-04 | End: 2019-12-15 | Stop reason: ALTCHOICE

## 2019-12-04 RX ADMIN — ONDANSETRON 4 MG: 2 INJECTION INTRAMUSCULAR; INTRAVENOUS at 08:38

## 2019-12-04 RX ADMIN — TAMSULOSIN HYDROCHLORIDE 0.4 MG: 0.4 CAPSULE ORAL at 08:39

## 2019-12-04 RX ADMIN — Medication 10 ML: at 08:41

## 2019-12-04 RX ADMIN — OXYBUTYNIN CHLORIDE 5 MG: 5 TABLET ORAL at 02:52

## 2019-12-04 RX ADMIN — OXYCODONE HYDROCHLORIDE AND ACETAMINOPHEN 2 TABLET: 5; 325 TABLET ORAL at 08:39

## 2019-12-04 RX ADMIN — CEFAZOLIN SODIUM 1 G: 1 INJECTION, SOLUTION INTRAVENOUS at 06:38

## 2019-12-04 ASSESSMENT — PAIN DESCRIPTION - ORIENTATION: ORIENTATION: LOWER

## 2019-12-04 ASSESSMENT — PAIN SCALES - GENERAL
PAINLEVEL_OUTOF10: 0
PAINLEVEL_OUTOF10: 7
PAINLEVEL_OUTOF10: 7

## 2019-12-04 ASSESSMENT — PAIN DESCRIPTION - LOCATION: LOCATION: ABDOMEN

## 2019-12-04 ASSESSMENT — PAIN DESCRIPTION - PAIN TYPE: TYPE: ACUTE PAIN

## 2019-12-05 ENCOUNTER — TELEPHONE (OUTPATIENT)
Dept: FAMILY MEDICINE CLINIC | Age: 24
End: 2019-12-05

## 2019-12-08 ENCOUNTER — HOSPITAL ENCOUNTER (EMERGENCY)
Age: 24
Discharge: HOME | End: 2019-12-08
Attending: FAMILY MEDICINE
Payer: COMMERCIAL

## 2019-12-08 ENCOUNTER — APPOINTMENT (OUTPATIENT)
Dept: ULTRASOUND IMAGING | Age: 24
End: 2019-12-08
Payer: COMMERCIAL

## 2019-12-08 VITALS
HEART RATE: 78 BPM | WEIGHT: 160 LBS | TEMPERATURE: 98.1 F | OXYGEN SATURATION: 100 % | HEIGHT: 60 IN | RESPIRATION RATE: 18 BRPM | DIASTOLIC BLOOD PRESSURE: 60 MMHG | SYSTOLIC BLOOD PRESSURE: 102 MMHG | BODY MASS INDEX: 31.41 KG/M2

## 2019-12-08 DIAGNOSIS — Z96.0 URETERAL STENT RETAINED: ICD-10-CM

## 2019-12-08 DIAGNOSIS — R55 SYNCOPE AND COLLAPSE: Primary | ICD-10-CM

## 2019-12-08 DIAGNOSIS — N30.01 ACUTE CYSTITIS WITH HEMATURIA: ICD-10-CM

## 2019-12-08 DIAGNOSIS — E87.6 HYPOKALEMIA: ICD-10-CM

## 2019-12-08 DIAGNOSIS — I95.89 OTHER SPECIFIED HYPOTENSION: ICD-10-CM

## 2019-12-08 LAB
ALBUMIN SERPL-MCNC: 3.8 G/DL (ref 3.5–5.1)
ALP BLD-CCNC: 42 U/L (ref 38–126)
ALT SERPL-CCNC: 10 U/L (ref 11–66)
AMORPHOUS: ABNORMAL
AMPHETAMINE+METHAMPHETAMINE URINE SCREEN: NEGATIVE
ANION GAP SERPL CALCULATED.3IONS-SCNC: 14 MEQ/L (ref 8–16)
AST SERPL-CCNC: 16 U/L (ref 5–40)
BACTERIA: ABNORMAL /HPF
BARBITURATE QUANTITATIVE URINE: NEGATIVE
BASOPHILS # BLD: 0.3 %
BASOPHILS ABSOLUTE: 0 THOU/MM3 (ref 0–0.1)
BENZODIAZEPINE QUANTITATIVE URINE: NEGATIVE
BILIRUB SERPL-MCNC: 0.2 MG/DL (ref 0.3–1.2)
BILIRUBIN DIRECT: < 0.2 MG/DL (ref 0–0.3)
BILIRUBIN URINE: ABNORMAL
BLOOD, URINE: ABNORMAL
BUN BLDV-MCNC: 6 MG/DL (ref 7–22)
CALCIUM SERPL-MCNC: 9.3 MG/DL (ref 8.5–10.5)
CANNABINOID QUANTITATIVE URINE: POSITIVE
CASTS 2: ABNORMAL /LPF
CASTS UA: ABNORMAL /LPF
CHARACTER, URINE: ABNORMAL
CHLORIDE BLD-SCNC: 98 MEQ/L (ref 98–111)
CO2: 25 MEQ/L (ref 23–33)
COCAINE METABOLITE QUANTITATIVE URINE: NEGATIVE
COLOR: ABNORMAL
CREAT SERPL-MCNC: 0.5 MG/DL (ref 0.4–1.2)
CRYSTALS, UA: ABNORMAL
EKG ATRIAL RATE: 70 BPM
EKG P AXIS: 57 DEGREES
EKG P-R INTERVAL: 132 MS
EKG Q-T INTERVAL: 368 MS
EKG QRS DURATION: 92 MS
EKG QTC CALCULATION (BAZETT): 397 MS
EKG R AXIS: 69 DEGREES
EKG T AXIS: 20 DEGREES
EKG VENTRICULAR RATE: 70 BPM
EOSINOPHIL # BLD: 0.4 %
EOSINOPHILS ABSOLUTE: 0 THOU/MM3 (ref 0–0.4)
EPITHELIAL CELLS, UA: ABNORMAL /HPF
ERYTHROCYTE [DISTWIDTH] IN BLOOD BY AUTOMATED COUNT: 12.6 % (ref 11.5–14.5)
ERYTHROCYTE [DISTWIDTH] IN BLOOD BY AUTOMATED COUNT: 42.5 FL (ref 35–45)
ETHYL ALCOHOL, SERUM: < 0.01 %
GFR SERPL CREATININE-BSD FRML MDRD: > 90 ML/MIN/1.73M2
GLUCOSE BLD-MCNC: 101 MG/DL (ref 70–108)
GLUCOSE URINE: NEGATIVE MG/DL
HCG,BETA SUBUNIT,QUAL,SERUM: ABNORMAL MIU/ML (ref 0–5)
HCT VFR BLD CALC: 38.1 % (ref 37–47)
HEMOGLOBIN: 12.9 GM/DL (ref 12–16)
ICTOTEST: NEGATIVE
IMMATURE GRANS (ABS): 0.04 THOU/MM3 (ref 0–0.07)
IMMATURE GRANULOCYTES: 0.5 %
KETONES, URINE: 80
LEUKOCYTE ESTERASE, URINE: ABNORMAL
LIPASE: 17.4 U/L (ref 5.6–51.3)
LYMPHOCYTES # BLD: 15.7 %
LYMPHOCYTES ABSOLUTE: 1.2 THOU/MM3 (ref 1–4.8)
MCH RBC QN AUTO: 31.4 PG (ref 26–33)
MCHC RBC AUTO-ENTMCNC: 33.9 GM/DL (ref 32.2–35.5)
MCV RBC AUTO: 92.7 FL (ref 81–99)
MISCELLANEOUS 2: ABNORMAL
MONOCYTES # BLD: 6.5 %
MONOCYTES ABSOLUTE: 0.5 THOU/MM3 (ref 0.4–1.3)
MUCUS: ABNORMAL
NITRITE, URINE: NEGATIVE
NUCLEATED RED BLOOD CELLS: 0 /100 WBC
OPIATES, URINE: NEGATIVE
OSMOLALITY CALCULATION: 271.6 MOSMOL/KG (ref 275–300)
OXYCODONE: POSITIVE
PH UA: 6 (ref 5–9)
PHENCYCLIDINE QUANTITATIVE URINE: POSITIVE
PLATELET # BLD: 231 THOU/MM3 (ref 130–400)
PMV BLD AUTO: 10.6 FL (ref 9.4–12.4)
POTASSIUM SERPL-SCNC: 3.1 MEQ/L (ref 3.5–5.2)
PROTEIN UA: 300
RBC # BLD: 4.11 MILL/MM3 (ref 4.2–5.4)
RBC URINE: > 200 /HPF
RENAL EPITHELIAL, UA: ABNORMAL
SEG NEUTROPHILS: 76.6 %
SEGMENTED NEUTROPHILS ABSOLUTE COUNT: 5.9 THOU/MM3 (ref 1.8–7.7)
SODIUM BLD-SCNC: 137 MEQ/L (ref 135–145)
SPECIFIC GRAVITY, URINE: 1.02 (ref 1–1.03)
TOTAL PROTEIN: 7 G/DL (ref 6.1–8)
TROPONIN T: < 0.01 NG/ML
UROBILINOGEN, URINE: 1 EU/DL (ref 0–1)
WBC # BLD: 7.7 THOU/MM3 (ref 4.8–10.8)
WBC UA: ABNORMAL /HPF
YEAST: ABNORMAL

## 2019-12-08 PROCEDURE — 76770 US EXAM ABDO BACK WALL COMP: CPT

## 2019-12-08 PROCEDURE — 84484 ASSAY OF TROPONIN QUANT: CPT

## 2019-12-08 PROCEDURE — 6360000002 HC RX W HCPCS: Performed by: FAMILY MEDICINE

## 2019-12-08 PROCEDURE — 2580000003 HC RX 258: Performed by: FAMILY MEDICINE

## 2019-12-08 PROCEDURE — 36415 COLL VENOUS BLD VENIPUNCTURE: CPT

## 2019-12-08 PROCEDURE — 87086 URINE CULTURE/COLONY COUNT: CPT

## 2019-12-08 PROCEDURE — 81001 URINALYSIS AUTO W/SCOPE: CPT

## 2019-12-08 PROCEDURE — 80053 COMPREHEN METABOLIC PANEL: CPT

## 2019-12-08 PROCEDURE — 93010 ELECTROCARDIOGRAM REPORT: CPT | Performed by: INTERNAL MEDICINE

## 2019-12-08 PROCEDURE — 87106 FUNGI IDENTIFICATION YEAST: CPT

## 2019-12-08 PROCEDURE — 87040 BLOOD CULTURE FOR BACTERIA: CPT

## 2019-12-08 PROCEDURE — G0480 DRUG TEST DEF 1-7 CLASSES: HCPCS

## 2019-12-08 PROCEDURE — 96365 THER/PROPH/DIAG IV INF INIT: CPT

## 2019-12-08 PROCEDURE — 85025 COMPLETE CBC W/AUTO DIFF WBC: CPT

## 2019-12-08 PROCEDURE — 80307 DRUG TEST PRSMV CHEM ANLYZR: CPT

## 2019-12-08 PROCEDURE — 84702 CHORIONIC GONADOTROPIN TEST: CPT

## 2019-12-08 PROCEDURE — 96375 TX/PRO/DX INJ NEW DRUG ADDON: CPT

## 2019-12-08 PROCEDURE — 82248 BILIRUBIN DIRECT: CPT

## 2019-12-08 PROCEDURE — 6370000000 HC RX 637 (ALT 250 FOR IP): Performed by: FAMILY MEDICINE

## 2019-12-08 PROCEDURE — 93005 ELECTROCARDIOGRAM TRACING: CPT | Performed by: FAMILY MEDICINE

## 2019-12-08 PROCEDURE — 83690 ASSAY OF LIPASE: CPT

## 2019-12-08 PROCEDURE — 99284 EMERGENCY DEPT VISIT MOD MDM: CPT

## 2019-12-08 RX ORDER — ACETAMINOPHEN 500 MG
1000 TABLET ORAL ONCE
Status: COMPLETED | OUTPATIENT
Start: 2019-12-08 | End: 2019-12-08

## 2019-12-08 RX ORDER — ONDANSETRON 2 MG/ML
4 INJECTION INTRAMUSCULAR; INTRAVENOUS ONCE
Status: COMPLETED | OUTPATIENT
Start: 2019-12-08 | End: 2019-12-08

## 2019-12-08 RX ORDER — CEPHALEXIN 500 MG/1
500 CAPSULE ORAL 2 TIMES DAILY
Qty: 14 CAPSULE | Refills: 0 | Status: ON HOLD | OUTPATIENT
Start: 2019-12-08 | End: 2019-12-17 | Stop reason: HOSPADM

## 2019-12-08 RX ORDER — 0.9 % SODIUM CHLORIDE 0.9 %
1000 INTRAVENOUS SOLUTION INTRAVENOUS ONCE
Status: COMPLETED | OUTPATIENT
Start: 2019-12-08 | End: 2019-12-08

## 2019-12-08 RX ORDER — POTASSIUM CHLORIDE 20 MEQ/1
40 TABLET, EXTENDED RELEASE ORAL ONCE
Status: COMPLETED | OUTPATIENT
Start: 2019-12-08 | End: 2019-12-08

## 2019-12-08 RX ADMIN — SODIUM CHLORIDE 1000 ML: 9 INJECTION, SOLUTION INTRAVENOUS at 15:27

## 2019-12-08 RX ADMIN — POTASSIUM CHLORIDE 40 MEQ: 20 TABLET, EXTENDED RELEASE ORAL at 17:49

## 2019-12-08 RX ADMIN — ONDANSETRON 4 MG: 2 INJECTION INTRAMUSCULAR; INTRAVENOUS at 15:27

## 2019-12-08 RX ADMIN — CEFTRIAXONE SODIUM 1 G: 1 INJECTION, POWDER, FOR SOLUTION INTRAMUSCULAR; INTRAVENOUS at 17:50

## 2019-12-08 RX ADMIN — ACETAMINOPHEN 1000 MG: 500 TABLET ORAL at 15:27

## 2019-12-08 ASSESSMENT — PAIN DESCRIPTION - LOCATION: LOCATION: HEAD

## 2019-12-08 ASSESSMENT — ENCOUNTER SYMPTOMS
WHEEZING: 0
NAUSEA: 0
SHORTNESS OF BREATH: 0
RHINORRHEA: 0
SORE THROAT: 0
DIARRHEA: 0
COUGH: 0
EYE PAIN: 0
BACK PAIN: 0
ABDOMINAL PAIN: 0
EYE DISCHARGE: 0
VOMITING: 0

## 2019-12-08 ASSESSMENT — PAIN SCALES - GENERAL: PAINLEVEL_OUTOF10: 9

## 2019-12-08 ASSESSMENT — PAIN DESCRIPTION - DESCRIPTORS: DESCRIPTORS: HEADACHE

## 2019-12-08 ASSESSMENT — PAIN DESCRIPTION - PAIN TYPE: TYPE: ACUTE PAIN

## 2019-12-08 ASSESSMENT — PAIN DESCRIPTION - FREQUENCY: FREQUENCY: CONTINUOUS

## 2019-12-10 ENCOUNTER — OFFICE VISIT (OUTPATIENT)
Dept: FAMILY MEDICINE CLINIC | Age: 24
End: 2019-12-10
Payer: COMMERCIAL

## 2019-12-10 ENCOUNTER — TELEPHONE (OUTPATIENT)
Dept: FAMILY MEDICINE CLINIC | Age: 24
End: 2019-12-10

## 2019-12-10 VITALS
WEIGHT: 153 LBS | HEIGHT: 60 IN | BODY MASS INDEX: 30.04 KG/M2 | SYSTOLIC BLOOD PRESSURE: 96 MMHG | TEMPERATURE: 98.3 F | DIASTOLIC BLOOD PRESSURE: 68 MMHG | HEART RATE: 64 BPM | RESPIRATION RATE: 12 BRPM

## 2019-12-10 DIAGNOSIS — Z3A.14 14 WEEKS GESTATION OF PREGNANCY: ICD-10-CM

## 2019-12-10 DIAGNOSIS — N13.30 HYDRONEPHROSIS OF LEFT KIDNEY: ICD-10-CM

## 2019-12-10 DIAGNOSIS — F32.9 REACTIVE DEPRESSION: ICD-10-CM

## 2019-12-10 DIAGNOSIS — E87.6 HYPOKALEMIA: ICD-10-CM

## 2019-12-10 DIAGNOSIS — N20.0 URINARY TRACT OBSTRUCTION BY KIDNEY STONE: ICD-10-CM

## 2019-12-10 DIAGNOSIS — N13.8 URINARY TRACT OBSTRUCTION BY KIDNEY STONE: ICD-10-CM

## 2019-12-10 DIAGNOSIS — N20.1 LEFT URETERAL STONE: Primary | ICD-10-CM

## 2019-12-10 PROCEDURE — G8484 FLU IMMUNIZE NO ADMIN: HCPCS | Performed by: FAMILY MEDICINE

## 2019-12-10 PROCEDURE — 1111F DSCHRG MED/CURRENT MED MERGE: CPT | Performed by: FAMILY MEDICINE

## 2019-12-10 PROCEDURE — 1036F TOBACCO NON-USER: CPT | Performed by: FAMILY MEDICINE

## 2019-12-10 PROCEDURE — G8427 DOCREV CUR MEDS BY ELIG CLIN: HCPCS | Performed by: FAMILY MEDICINE

## 2019-12-10 PROCEDURE — G8419 CALC BMI OUT NRM PARAM NOF/U: HCPCS | Performed by: FAMILY MEDICINE

## 2019-12-10 PROCEDURE — 99213 OFFICE O/P EST LOW 20 MIN: CPT | Performed by: FAMILY MEDICINE

## 2019-12-10 ASSESSMENT — PATIENT HEALTH QUESTIONNAIRE - PHQ9
10. IF YOU CHECKED OFF ANY PROBLEMS, HOW DIFFICULT HAVE THESE PROBLEMS MADE IT FOR YOU TO DO YOUR WORK, TAKE CARE OF THINGS AT HOME, OR GET ALONG WITH OTHER PEOPLE: 2
9. THOUGHTS THAT YOU WOULD BE BETTER OFF DEAD, OR OF HURTING YOURSELF: 3
SUM OF ALL RESPONSES TO PHQ QUESTIONS 1-9: 24
5. POOR APPETITE OR OVEREATING: 3
2. FEELING DOWN, DEPRESSED OR HOPELESS: 3
1. LITTLE INTEREST OR PLEASURE IN DOING THINGS: 3
SUM OF ALL RESPONSES TO PHQ9 QUESTIONS 1 & 2: 6
3. TROUBLE FALLING OR STAYING ASLEEP: 3
6. FEELING BAD ABOUT YOURSELF - OR THAT YOU ARE A FAILURE OR HAVE LET YOURSELF OR YOUR FAMILY DOWN: 3
4. FEELING TIRED OR HAVING LITTLE ENERGY: 3
8. MOVING OR SPEAKING SO SLOWLY THAT OTHER PEOPLE COULD HAVE NOTICED. OR THE OPPOSITE, BEING SO FIGETY OR RESTLESS THAT YOU HAVE BEEN MOVING AROUND A LOT MORE THAN USUAL: 0
7. TROUBLE CONCENTRATING ON THINGS, SUCH AS READING THE NEWSPAPER OR WATCHING TELEVISION: 3
SUM OF ALL RESPONSES TO PHQ QUESTIONS 1-9: 24

## 2019-12-10 ASSESSMENT — COLUMBIA-SUICIDE SEVERITY RATING SCALE - C-SSRS
6. HAVE YOU EVER DONE ANYTHING, STARTED TO DO ANYTHING, OR PREPARED TO DO ANYTHING TO END YOUR LIFE?: NO
1. WITHIN THE PAST MONTH, HAVE YOU WISHED YOU WERE DEAD OR WISHED YOU COULD GO TO SLEEP AND NOT WAKE UP?: YES
2. HAVE YOU ACTUALLY HAD ANY THOUGHTS OF KILLING YOURSELF?: NO

## 2019-12-13 ENCOUNTER — TELEPHONE (OUTPATIENT)
Dept: UROLOGY | Age: 24
End: 2019-12-13

## 2019-12-13 DIAGNOSIS — R10.9 FLANK PAIN: Primary | ICD-10-CM

## 2019-12-13 LAB
ORGANISM: ABNORMAL
URINE CULTURE REFLEX: ABNORMAL
URINE CULTURE REFLEX: ABNORMAL

## 2019-12-13 RX ORDER — OXYCODONE HYDROCHLORIDE AND ACETAMINOPHEN 5; 325 MG/1; MG/1
1 TABLET ORAL EVERY 8 HOURS PRN
Qty: 15 TABLET | Refills: 0 | Status: SHIPPED | OUTPATIENT
Start: 2019-12-13 | End: 2019-12-18

## 2019-12-14 LAB
BLOOD CULTURE, ROUTINE: NORMAL
BLOOD CULTURE, ROUTINE: NORMAL

## 2019-12-15 ENCOUNTER — HOSPITAL ENCOUNTER (INPATIENT)
Age: 24
LOS: 2 days | Discharge: HOME | DRG: 832 | End: 2019-12-17
Attending: INTERNAL MEDICINE | Admitting: INTERNAL MEDICINE
Payer: COMMERCIAL

## 2019-12-15 ENCOUNTER — APPOINTMENT (OUTPATIENT)
Dept: ULTRASOUND IMAGING | Age: 24
DRG: 832 | End: 2019-12-15
Payer: COMMERCIAL

## 2019-12-15 DIAGNOSIS — R31.9 URINARY TRACT INFECTION WITH HEMATURIA, SITE UNSPECIFIED: ICD-10-CM

## 2019-12-15 DIAGNOSIS — Z3A.15 15 WEEKS GESTATION OF PREGNANCY: ICD-10-CM

## 2019-12-15 DIAGNOSIS — N39.0 URINARY TRACT INFECTION WITH HEMATURIA, SITE UNSPECIFIED: ICD-10-CM

## 2019-12-15 DIAGNOSIS — N23 RENAL COLIC: Primary | ICD-10-CM

## 2019-12-15 PROBLEM — N20.0 KIDNEY STONE: Status: ACTIVE | Noted: 2019-12-15

## 2019-12-15 LAB
ANION GAP SERPL CALCULATED.3IONS-SCNC: 15 MEQ/L (ref 8–16)
BACTERIA: ABNORMAL
BASOPHILS # BLD: 0.2 %
BASOPHILS ABSOLUTE: 0 THOU/MM3 (ref 0–0.1)
BILIRUBIN URINE: ABNORMAL
BLOOD, URINE: ABNORMAL
BUN BLDV-MCNC: 7 MG/DL (ref 7–22)
CALCIUM SERPL-MCNC: 9.3 MG/DL (ref 8.5–10.5)
CASTS: ABNORMAL /LPF
CASTS: ABNORMAL /LPF
CHARACTER, URINE: ABNORMAL
CHLORIDE BLD-SCNC: 101 MEQ/L (ref 98–111)
CO2: 20 MEQ/L (ref 23–33)
COLOR: ABNORMAL
CREAT SERPL-MCNC: 0.5 MG/DL (ref 0.4–1.2)
CRYSTALS: ABNORMAL
EOSINOPHIL # BLD: 0.6 %
EOSINOPHILS ABSOLUTE: 0.1 THOU/MM3 (ref 0–0.4)
EPITHELIAL CELLS, UA: ABNORMAL /HPF
ERYTHROCYTE [DISTWIDTH] IN BLOOD BY AUTOMATED COUNT: 12.7 % (ref 11.5–14.5)
ERYTHROCYTE [DISTWIDTH] IN BLOOD BY AUTOMATED COUNT: 42.9 FL (ref 35–45)
GFR SERPL CREATININE-BSD FRML MDRD: > 90 ML/MIN/1.73M2
GLUCOSE BLD-MCNC: 73 MG/DL (ref 70–108)
GLUCOSE, URINE: NEGATIVE MG/DL
HCT VFR BLD CALC: 38.5 % (ref 37–47)
HEMOGLOBIN: 13 GM/DL (ref 12–16)
ICTOTEST: NEGATIVE
IMMATURE GRANS (ABS): 0.05 THOU/MM3 (ref 0–0.07)
IMMATURE GRANULOCYTES: 0.5 %
KETONES, URINE: >= 80
LEUKOCYTE ESTERASE, URINE: ABNORMAL
LYMPHOCYTES # BLD: 14 %
LYMPHOCYTES ABSOLUTE: 1.5 THOU/MM3 (ref 1–4.8)
MCH RBC QN AUTO: 31.6 PG (ref 26–33)
MCHC RBC AUTO-ENTMCNC: 33.8 GM/DL (ref 32.2–35.5)
MCV RBC AUTO: 93.4 FL (ref 81–99)
MISCELLANEOUS LAB TEST RESULT: ABNORMAL
MONOCYTES # BLD: 6.4 %
MONOCYTES ABSOLUTE: 0.7 THOU/MM3 (ref 0.4–1.3)
NITRITE, URINE: NEGATIVE
NUCLEATED RED BLOOD CELLS: 0 /100 WBC
OSMOLALITY CALCULATION: 268.5 MOSMOL/KG (ref 275–300)
PH UA: 6 (ref 5–9)
PLATELET # BLD: 232 THOU/MM3 (ref 130–400)
PMV BLD AUTO: 10.3 FL (ref 9.4–12.4)
POTASSIUM SERPL-SCNC: 3.9 MEQ/L (ref 3.5–5.2)
PROTEIN UA: 100 MG/DL
RBC # BLD: 4.12 MILL/MM3 (ref 4.2–5.4)
RBC URINE: > 200 /HPF
RENAL EPITHELIAL, UA: ABNORMAL
SEG NEUTROPHILS: 78.3 %
SEGMENTED NEUTROPHILS ABSOLUTE COUNT: 8.3 THOU/MM3 (ref 1.8–7.7)
SODIUM BLD-SCNC: 136 MEQ/L (ref 135–145)
SPECIFIC GRAVITY UA: >= 1.03 (ref 1–1.03)
UROBILINOGEN, URINE: 0.2 EU/DL (ref 0–1)
WBC # BLD: 10.6 THOU/MM3 (ref 4.8–10.8)
WBC UA: ABNORMAL /HPF
YEAST: ABNORMAL

## 2019-12-15 PROCEDURE — 2709999900 HC NON-CHARGEABLE SUPPLY

## 2019-12-15 PROCEDURE — 85025 COMPLETE CBC W/AUTO DIFF WBC: CPT

## 2019-12-15 PROCEDURE — 87086 URINE CULTURE/COLONY COUNT: CPT

## 2019-12-15 PROCEDURE — 80305 DRUG TEST PRSMV DIR OPT OBS: CPT

## 2019-12-15 PROCEDURE — 51701 INSERT BLADDER CATHETER: CPT

## 2019-12-15 PROCEDURE — 96374 THER/PROPH/DIAG INJ IV PUSH: CPT

## 2019-12-15 PROCEDURE — 76775 US EXAM ABDO BACK WALL LIM: CPT

## 2019-12-15 PROCEDURE — 6360000002 HC RX W HCPCS: Performed by: NURSE PRACTITIONER

## 2019-12-15 PROCEDURE — 96375 TX/PRO/DX INJ NEW DRUG ADDON: CPT

## 2019-12-15 PROCEDURE — 99285 EMERGENCY DEPT VISIT HI MDM: CPT

## 2019-12-15 PROCEDURE — 87040 BLOOD CULTURE FOR BACTERIA: CPT

## 2019-12-15 PROCEDURE — 80048 BASIC METABOLIC PNL TOTAL CA: CPT

## 2019-12-15 PROCEDURE — 99223 1ST HOSP IP/OBS HIGH 75: CPT | Performed by: PHYSICIAN ASSISTANT

## 2019-12-15 PROCEDURE — 36415 COLL VENOUS BLD VENIPUNCTURE: CPT

## 2019-12-15 PROCEDURE — 2580000003 HC RX 258: Performed by: NURSE PRACTITIONER

## 2019-12-15 PROCEDURE — 81001 URINALYSIS AUTO W/SCOPE: CPT

## 2019-12-15 PROCEDURE — 1200000000 HC SEMI PRIVATE

## 2019-12-15 RX ORDER — SODIUM CHLORIDE 0.9 % (FLUSH) 0.9 %
10 SYRINGE (ML) INJECTION PRN
Status: DISCONTINUED | OUTPATIENT
Start: 2019-12-15 | End: 2019-12-17 | Stop reason: HOSPADM

## 2019-12-15 RX ORDER — SODIUM CHLORIDE, SODIUM LACTATE, POTASSIUM CHLORIDE, AND CALCIUM CHLORIDE .6; .31; .03; .02 G/100ML; G/100ML; G/100ML; G/100ML
1000 INJECTION, SOLUTION INTRAVENOUS ONCE
Status: COMPLETED | OUTPATIENT
Start: 2019-12-15 | End: 2019-12-15

## 2019-12-15 RX ORDER — ONDANSETRON 4 MG/1
4 TABLET, ORALLY DISINTEGRATING ORAL EVERY 8 HOURS PRN
Status: DISCONTINUED | OUTPATIENT
Start: 2019-12-15 | End: 2019-12-17 | Stop reason: HOSPADM

## 2019-12-15 RX ORDER — OXYCODONE HYDROCHLORIDE AND ACETAMINOPHEN 5; 325 MG/1; MG/1
1 TABLET ORAL EVERY 8 HOURS PRN
Status: DISCONTINUED | OUTPATIENT
Start: 2019-12-15 | End: 2019-12-17 | Stop reason: HOSPADM

## 2019-12-15 RX ORDER — MORPHINE SULFATE 10 MG/ML
6 INJECTION, SOLUTION INTRAMUSCULAR; INTRAVENOUS ONCE
Status: COMPLETED | OUTPATIENT
Start: 2019-12-15 | End: 2019-12-15

## 2019-12-15 RX ORDER — FOLIC ACID 1 MG/1
1 TABLET ORAL DAILY
Status: DISCONTINUED | OUTPATIENT
Start: 2019-12-15 | End: 2019-12-17 | Stop reason: HOSPADM

## 2019-12-15 RX ORDER — ONDANSETRON 2 MG/ML
4 INJECTION INTRAMUSCULAR; INTRAVENOUS EVERY 6 HOURS PRN
Status: DISCONTINUED | OUTPATIENT
Start: 2019-12-15 | End: 2019-12-17 | Stop reason: HOSPADM

## 2019-12-15 RX ORDER — SODIUM CHLORIDE 0.9 % (FLUSH) 0.9 %
10 SYRINGE (ML) INJECTION EVERY 12 HOURS SCHEDULED
Status: DISCONTINUED | OUTPATIENT
Start: 2019-12-15 | End: 2019-12-17 | Stop reason: HOSPADM

## 2019-12-15 RX ORDER — ONDANSETRON 2 MG/ML
4 INJECTION INTRAMUSCULAR; INTRAVENOUS ONCE
Status: COMPLETED | OUTPATIENT
Start: 2019-12-15 | End: 2019-12-15

## 2019-12-15 RX ORDER — ONDANSETRON 4 MG/1
4 TABLET, ORALLY DISINTEGRATING ORAL EVERY 8 HOURS PRN
COMMUNITY
End: 2020-01-01

## 2019-12-15 RX ORDER — METOCLOPRAMIDE 10 MG/1
10 TABLET ORAL EVERY 6 HOURS PRN
Status: DISCONTINUED | OUTPATIENT
Start: 2019-12-15 | End: 2019-12-17 | Stop reason: HOSPADM

## 2019-12-15 RX ADMIN — CEFAZOLIN 1 G: 1 INJECTION, POWDER, FOR SOLUTION INTRAMUSCULAR; INTRAVENOUS at 20:47

## 2019-12-15 RX ADMIN — SODIUM CHLORIDE, POTASSIUM CHLORIDE, SODIUM LACTATE AND CALCIUM CHLORIDE 1000 ML: 600; 310; 30; 20 INJECTION, SOLUTION INTRAVENOUS at 19:38

## 2019-12-15 RX ADMIN — MORPHINE SULFATE 6 MG: 10 INJECTION INTRAVENOUS at 18:41

## 2019-12-15 RX ADMIN — ONDANSETRON 4 MG: 2 INJECTION INTRAMUSCULAR; INTRAVENOUS at 18:41

## 2019-12-15 ASSESSMENT — PAIN DESCRIPTION - PROGRESSION: CLINICAL_PROGRESSION: RAPIDLY IMPROVING

## 2019-12-15 ASSESSMENT — PAIN DESCRIPTION - FREQUENCY
FREQUENCY: CONTINUOUS
FREQUENCY: CONTINUOUS

## 2019-12-15 ASSESSMENT — ENCOUNTER SYMPTOMS
COUGH: 0
ABDOMINAL DISTENTION: 0
CONSTIPATION: 0
EYE REDNESS: 0
BACK PAIN: 1
RHINORRHEA: 0
COLOR CHANGE: 0
NAUSEA: 1
ABDOMINAL PAIN: 1
WHEEZING: 0
SHORTNESS OF BREATH: 0
SINUS PAIN: 0
VOMITING: 1
DIARRHEA: 0
SORE THROAT: 0
EYE PAIN: 0

## 2019-12-15 ASSESSMENT — PAIN DESCRIPTION - ONSET: ONSET: ON-GOING

## 2019-12-15 ASSESSMENT — PAIN DESCRIPTION - LOCATION
LOCATION: FLANK
LOCATION: FLANK

## 2019-12-15 ASSESSMENT — PAIN DESCRIPTION - ORIENTATION
ORIENTATION: LEFT
ORIENTATION: LEFT

## 2019-12-15 ASSESSMENT — PAIN SCALES - GENERAL
PAINLEVEL_OUTOF10: 2
PAINLEVEL_OUTOF10: 0
PAINLEVEL_OUTOF10: 9

## 2019-12-15 ASSESSMENT — PAIN DESCRIPTION - PAIN TYPE
TYPE: ACUTE PAIN

## 2019-12-15 ASSESSMENT — PAIN DESCRIPTION - DESCRIPTORS
DESCRIPTORS: SHARP;CONSTANT;RADIATING
DESCRIPTORS: ACHING;DULL;CONSTANT

## 2019-12-16 LAB
AMPHETAMINE+METHAMPHETAMINE URINE SCREEN: NEGATIVE
ANION GAP SERPL CALCULATED.3IONS-SCNC: 13 MEQ/L (ref 8–16)
BARBITURATE QUANTITATIVE URINE: NEGATIVE
BENZODIAZEPINE QUANTITATIVE URINE: NEGATIVE
BUN BLDV-MCNC: 6 MG/DL (ref 7–22)
CALCIUM SERPL-MCNC: 9.2 MG/DL (ref 8.5–10.5)
CANNABINOID QUANTITATIVE URINE: NORMAL
CHLORIDE BLD-SCNC: 104 MEQ/L (ref 98–111)
CO2: 23 MEQ/L (ref 23–33)
COCAINE METABOLITE QUANTITATIVE URINE: NEGATIVE
CREAT SERPL-MCNC: 0.5 MG/DL (ref 0.4–1.2)
ERYTHROCYTE [DISTWIDTH] IN BLOOD BY AUTOMATED COUNT: 12.8 % (ref 11.5–14.5)
ERYTHROCYTE [DISTWIDTH] IN BLOOD BY AUTOMATED COUNT: 44.2 FL (ref 35–45)
GFR SERPL CREATININE-BSD FRML MDRD: > 90 ML/MIN/1.73M2
GLUCOSE BLD-MCNC: 88 MG/DL (ref 70–108)
HCT VFR BLD CALC: 36.1 % (ref 37–47)
HEMOGLOBIN: 11.9 GM/DL (ref 12–16)
MCH RBC QN AUTO: 31.6 PG (ref 26–33)
MCHC RBC AUTO-ENTMCNC: 33 GM/DL (ref 32.2–35.5)
MCV RBC AUTO: 96 FL (ref 81–99)
OPIATES, URINE: NEGATIVE
OSMOLALITY CALCULATION: 276.4 MOSMOL/KG (ref 275–300)
OXYCODONE: POSITIVE
PHENCYCLIDINE QUANTITATIVE URINE: NEGATIVE
PLATELET # BLD: 223 THOU/MM3 (ref 130–400)
PMV BLD AUTO: 10.5 FL (ref 9.4–12.4)
POTASSIUM SERPL-SCNC: 4.2 MEQ/L (ref 3.5–5.2)
RBC # BLD: 3.76 MILL/MM3 (ref 4.2–5.4)
SODIUM BLD-SCNC: 140 MEQ/L (ref 135–145)
WBC # BLD: 7.8 THOU/MM3 (ref 4.8–10.8)

## 2019-12-16 PROCEDURE — 2580000003 HC RX 258: Performed by: INTERNAL MEDICINE

## 2019-12-16 PROCEDURE — 6370000000 HC RX 637 (ALT 250 FOR IP): Performed by: PHYSICIAN ASSISTANT

## 2019-12-16 PROCEDURE — 99232 SBSQ HOSP IP/OBS MODERATE 35: CPT | Performed by: INTERNAL MEDICINE

## 2019-12-16 PROCEDURE — 99253 IP/OBS CNSLTJ NEW/EST LOW 45: CPT | Performed by: NURSE PRACTITIONER

## 2019-12-16 PROCEDURE — 85027 COMPLETE CBC AUTOMATED: CPT

## 2019-12-16 PROCEDURE — 1200000000 HC SEMI PRIVATE

## 2019-12-16 PROCEDURE — 80048 BASIC METABOLIC PNL TOTAL CA: CPT

## 2019-12-16 PROCEDURE — 6360000002 HC RX W HCPCS: Performed by: INTERNAL MEDICINE

## 2019-12-16 PROCEDURE — 2709999900 HC NON-CHARGEABLE SUPPLY

## 2019-12-16 PROCEDURE — 36415 COLL VENOUS BLD VENIPUNCTURE: CPT

## 2019-12-16 RX ORDER — SODIUM CHLORIDE, SODIUM LACTATE, POTASSIUM CHLORIDE, CALCIUM CHLORIDE 600; 310; 30; 20 MG/100ML; MG/100ML; MG/100ML; MG/100ML
INJECTION, SOLUTION INTRAVENOUS CONTINUOUS
Status: DISCONTINUED | OUTPATIENT
Start: 2019-12-16 | End: 2019-12-17

## 2019-12-16 RX ADMIN — FOLIC ACID 1 MG: 1 TABLET ORAL at 10:06

## 2019-12-16 RX ADMIN — SODIUM CHLORIDE, POTASSIUM CHLORIDE, SODIUM LACTATE AND CALCIUM CHLORIDE: 600; 310; 30; 20 INJECTION, SOLUTION INTRAVENOUS at 10:08

## 2019-12-16 RX ADMIN — CEFTRIAXONE SODIUM 1 G: 1 INJECTION, POWDER, FOR SOLUTION INTRAMUSCULAR; INTRAVENOUS at 11:52

## 2019-12-16 RX ADMIN — OXYCODONE HYDROCHLORIDE AND ACETAMINOPHEN 1 TABLET: 5; 325 TABLET ORAL at 09:20

## 2019-12-16 RX ADMIN — SODIUM CHLORIDE, POTASSIUM CHLORIDE, SODIUM LACTATE AND CALCIUM CHLORIDE: 600; 310; 30; 20 INJECTION, SOLUTION INTRAVENOUS at 22:28

## 2019-12-16 RX ADMIN — OXYCODONE HYDROCHLORIDE AND ACETAMINOPHEN 1 TABLET: 5; 325 TABLET ORAL at 22:26

## 2019-12-16 ASSESSMENT — PAIN SCALES - GENERAL
PAINLEVEL_OUTOF10: 3
PAINLEVEL_OUTOF10: 1
PAINLEVEL_OUTOF10: 5
PAINLEVEL_OUTOF10: 3
PAINLEVEL_OUTOF10: 7

## 2019-12-16 ASSESSMENT — PAIN DESCRIPTION - ORIENTATION
ORIENTATION: LEFT
ORIENTATION_2: LEFT
ORIENTATION: LEFT

## 2019-12-16 ASSESSMENT — PAIN DESCRIPTION - INTENSITY: RATING_2: 4

## 2019-12-16 ASSESSMENT — PAIN DESCRIPTION - DESCRIPTORS
DESCRIPTORS_2: DULL;ACHING
DESCRIPTORS: ACHING
DESCRIPTORS: SHARP
DESCRIPTORS: DULL;ACHING
DESCRIPTORS: ACHING
DESCRIPTORS: ACHING;DULL
DESCRIPTORS: ACHING;DULL

## 2019-12-16 ASSESSMENT — ENCOUNTER SYMPTOMS
NAUSEA: 1
COUGH: 0
VOMITING: 0
RESPIRATORY NEGATIVE: 1
ABDOMINAL PAIN: 0
CHEST TIGHTNESS: 0
ABDOMINAL PAIN: 1
EYES NEGATIVE: 1
RHINORRHEA: 0
SORE THROAT: 0
SHORTNESS OF BREATH: 0
NAUSEA: 0
DIARRHEA: 0
BACK PAIN: 0

## 2019-12-16 ASSESSMENT — PAIN DESCRIPTION - LOCATION
LOCATION: FLANK
LOCATION_2: GROIN

## 2019-12-16 ASSESSMENT — PAIN DESCRIPTION - PAIN TYPE
TYPE: ACUTE PAIN
TYPE_2: ACUTE PAIN
TYPE: ACUTE PAIN

## 2019-12-17 ENCOUNTER — TELEPHONE (OUTPATIENT)
Dept: FAMILY MEDICINE CLINIC | Age: 24
End: 2019-12-17

## 2019-12-17 VITALS
WEIGHT: 154.32 LBS | DIASTOLIC BLOOD PRESSURE: 64 MMHG | OXYGEN SATURATION: 96 % | TEMPERATURE: 98.1 F | HEART RATE: 72 BPM | SYSTOLIC BLOOD PRESSURE: 97 MMHG | RESPIRATION RATE: 17 BRPM | BODY MASS INDEX: 30.3 KG/M2 | HEIGHT: 60 IN

## 2019-12-17 LAB — URINE CULTURE, ROUTINE: NORMAL

## 2019-12-17 PROCEDURE — 99238 HOSP IP/OBS DSCHRG MGMT 30/<: CPT | Performed by: INTERNAL MEDICINE

## 2019-12-17 PROCEDURE — 2580000003 HC RX 258: Performed by: INTERNAL MEDICINE

## 2019-12-17 PROCEDURE — 6360000002 HC RX W HCPCS: Performed by: INTERNAL MEDICINE

## 2019-12-17 PROCEDURE — 2709999900 HC NON-CHARGEABLE SUPPLY

## 2019-12-17 PROCEDURE — 6360000002 HC RX W HCPCS: Performed by: PHYSICIAN ASSISTANT

## 2019-12-17 PROCEDURE — 6370000000 HC RX 637 (ALT 250 FOR IP): Performed by: PHYSICIAN ASSISTANT

## 2019-12-17 PROCEDURE — 6370000000 HC RX 637 (ALT 250 FOR IP): Performed by: OBSTETRICS & GYNECOLOGY

## 2019-12-17 PROCEDURE — 99232 SBSQ HOSP IP/OBS MODERATE 35: CPT | Performed by: NURSE PRACTITIONER

## 2019-12-17 RX ORDER — POLYETHYLENE GLYCOL 3350 17 G/17G
17 POWDER, FOR SOLUTION ORAL DAILY
Qty: 527 G | Refills: 1 | COMMUNITY
Start: 2019-12-17 | End: 2020-01-01

## 2019-12-17 RX ORDER — POLYETHYLENE GLYCOL 3350 17 G/17G
17 POWDER, FOR SOLUTION ORAL DAILY
Status: DISCONTINUED | OUTPATIENT
Start: 2019-12-17 | End: 2019-12-17 | Stop reason: HOSPADM

## 2019-12-17 RX ORDER — PSEUDOEPHEDRINE HCL 30 MG
100 TABLET ORAL 2 TIMES DAILY
Qty: 30 CAPSULE | Refills: 0 | COMMUNITY
Start: 2019-12-17 | End: 2020-01-01

## 2019-12-17 RX ORDER — DOCUSATE SODIUM 100 MG/1
100 CAPSULE, LIQUID FILLED ORAL 2 TIMES DAILY
Status: DISCONTINUED | OUTPATIENT
Start: 2019-12-17 | End: 2019-12-17 | Stop reason: HOSPADM

## 2019-12-17 RX ORDER — TAMSULOSIN HYDROCHLORIDE 0.4 MG/1
0.4 CAPSULE ORAL DAILY
Qty: 30 CAPSULE | Refills: 1 | Status: SHIPPED | OUTPATIENT
Start: 2019-12-17 | End: 2020-01-24

## 2019-12-17 RX ORDER — FOLIC ACID 1 MG/1
1 TABLET ORAL DAILY
Qty: 30 TABLET | Refills: 3 | Status: SHIPPED | OUTPATIENT
Start: 2019-12-17 | End: 2020-03-09

## 2019-12-17 RX ADMIN — DOCUSATE SODIUM 100 MG: 100 CAPSULE, LIQUID FILLED ORAL at 09:42

## 2019-12-17 RX ADMIN — ONDANSETRON 4 MG: 2 INJECTION INTRAMUSCULAR; INTRAVENOUS at 09:39

## 2019-12-17 RX ADMIN — CEFTRIAXONE SODIUM 1 G: 1 INJECTION, POWDER, FOR SOLUTION INTRAMUSCULAR; INTRAVENOUS at 11:22

## 2019-12-17 RX ADMIN — POLYETHYLENE GLYCOL 3350 17 G: 17 POWDER, FOR SOLUTION ORAL at 09:39

## 2019-12-17 RX ADMIN — FOLIC ACID 1 MG: 1 TABLET ORAL at 09:39

## 2019-12-17 RX ADMIN — OXYCODONE HYDROCHLORIDE AND ACETAMINOPHEN 1 TABLET: 5; 325 TABLET ORAL at 09:42

## 2019-12-17 ASSESSMENT — PAIN DESCRIPTION - DESCRIPTORS
DESCRIPTORS: SHARP
DESCRIPTORS: ACHING
DESCRIPTORS: ACHING

## 2019-12-17 ASSESSMENT — PAIN DESCRIPTION - LOCATION
LOCATION: FLANK
LOCATION: ABDOMEN
LOCATION: FLANK

## 2019-12-17 ASSESSMENT — PAIN DESCRIPTION - PAIN TYPE
TYPE: ACUTE PAIN

## 2019-12-17 ASSESSMENT — PAIN DESCRIPTION - ORIENTATION
ORIENTATION: LEFT
ORIENTATION: LEFT

## 2019-12-17 ASSESSMENT — PAIN SCALES - GENERAL
PAINLEVEL_OUTOF10: 1
PAINLEVEL_OUTOF10: 1
PAINLEVEL_OUTOF10: 7
PAINLEVEL_OUTOF10: 1

## 2019-12-18 ENCOUNTER — TELEPHONE (OUTPATIENT)
Dept: UROLOGY | Age: 24
End: 2019-12-18

## 2019-12-18 DIAGNOSIS — Z87.440 HISTORY OF RECURRENT UTIS: Primary | ICD-10-CM

## 2019-12-18 DIAGNOSIS — Z01.818 PREOPERATIVE TESTING: ICD-10-CM

## 2019-12-21 LAB
BLOOD CULTURE, ROUTINE: NORMAL
BLOOD CULTURE, ROUTINE: NORMAL

## 2019-12-23 DIAGNOSIS — N20.1 LEFT URETERAL STONE: ICD-10-CM

## 2019-12-23 DIAGNOSIS — T83.84XA PAIN DUE TO URETERAL STENT, INITIAL ENCOUNTER (HCC): ICD-10-CM

## 2019-12-23 RX ORDER — OXYBUTYNIN CHLORIDE 5 MG/1
5 TABLET ORAL EVERY 8 HOURS PRN
Qty: 90 TABLET | Refills: 3 | Status: SHIPPED | OUTPATIENT
Start: 2019-12-23 | End: 2020-01-01

## 2019-12-23 RX ORDER — OXYCODONE HYDROCHLORIDE AND ACETAMINOPHEN 5; 325 MG/1; MG/1
1 TABLET ORAL EVERY 8 HOURS PRN
Qty: 15 TABLET | Refills: 0 | Status: SHIPPED | OUTPATIENT
Start: 2019-12-23 | End: 2019-12-28

## 2019-12-24 ENCOUNTER — TELEPHONE (OUTPATIENT)
Dept: UROLOGY | Age: 24
End: 2019-12-24

## 2019-12-30 ENCOUNTER — PREP FOR PROCEDURE (OUTPATIENT)
Dept: UROLOGY | Age: 24
End: 2019-12-30

## 2019-12-30 RX ORDER — SODIUM CHLORIDE 9 MG/ML
INJECTION, SOLUTION INTRAVENOUS CONTINUOUS
Status: CANCELLED | OUTPATIENT
Start: 2020-01-20

## 2020-01-01 ENCOUNTER — APPOINTMENT (OUTPATIENT)
Dept: ULTRASOUND IMAGING | Age: 25
End: 2020-01-01
Payer: COMMERCIAL

## 2020-01-01 ENCOUNTER — HOSPITAL ENCOUNTER (EMERGENCY)
Age: 25
Discharge: HOME | End: 2020-01-01
Attending: EMERGENCY MEDICINE
Payer: COMMERCIAL

## 2020-01-01 VITALS
HEART RATE: 84 BPM | TEMPERATURE: 98 F | RESPIRATION RATE: 15 BRPM | HEIGHT: 61 IN | WEIGHT: 154 LBS | BODY MASS INDEX: 29.07 KG/M2 | OXYGEN SATURATION: 97 % | DIASTOLIC BLOOD PRESSURE: 68 MMHG | SYSTOLIC BLOOD PRESSURE: 104 MMHG

## 2020-01-01 LAB
ABO: NORMAL
ALBUMIN SERPL-MCNC: 3.8 G/DL (ref 3.5–5.1)
ALP BLD-CCNC: 33 U/L (ref 38–126)
ALT SERPL-CCNC: 7 U/L (ref 11–66)
ANION GAP SERPL CALCULATED.3IONS-SCNC: 14 MEQ/L (ref 8–16)
AST SERPL-CCNC: 11 U/L (ref 5–40)
BASOPHILS # BLD: 0.2 %
BASOPHILS ABSOLUTE: 0 THOU/MM3 (ref 0–0.1)
BILIRUB SERPL-MCNC: 0.3 MG/DL (ref 0.3–1.2)
BUN BLDV-MCNC: 6 MG/DL (ref 7–22)
CALCIUM SERPL-MCNC: 9.3 MG/DL (ref 8.5–10.5)
CHLORIDE BLD-SCNC: 102 MEQ/L (ref 98–111)
CO2: 22 MEQ/L (ref 23–33)
CREAT SERPL-MCNC: 0.4 MG/DL (ref 0.4–1.2)
EOSINOPHIL # BLD: 1.7 %
EOSINOPHILS ABSOLUTE: 0.2 THOU/MM3 (ref 0–0.4)
ERYTHROCYTE [DISTWIDTH] IN BLOOD BY AUTOMATED COUNT: 13.1 % (ref 11.5–14.5)
ERYTHROCYTE [DISTWIDTH] IN BLOOD BY AUTOMATED COUNT: 45 FL (ref 35–45)
GFR SERPL CREATININE-BSD FRML MDRD: > 90 ML/MIN/1.73M2
GLUCOSE BLD-MCNC: 79 MG/DL (ref 70–108)
HCT VFR BLD CALC: 37.9 % (ref 37–47)
HEMOGLOBIN: 12.6 GM/DL (ref 12–16)
IMMATURE GRANS (ABS): 0.04 THOU/MM3 (ref 0–0.07)
IMMATURE GRANULOCYTES: 0.4 %
LYMPHOCYTES # BLD: 24.7 %
LYMPHOCYTES ABSOLUTE: 2.2 THOU/MM3 (ref 1–4.8)
MCH RBC QN AUTO: 31.5 PG (ref 26–33)
MCHC RBC AUTO-ENTMCNC: 33.2 GM/DL (ref 32.2–35.5)
MCV RBC AUTO: 94.8 FL (ref 81–99)
MONOCYTES # BLD: 5.5 %
MONOCYTES ABSOLUTE: 0.5 THOU/MM3 (ref 0.4–1.3)
NUCLEATED RED BLOOD CELLS: 0 /100 WBC
OSMOLALITY CALCULATION: 272.2 MOSMOL/KG (ref 275–300)
PLATELET # BLD: 201 THOU/MM3 (ref 130–400)
PMV BLD AUTO: 10.2 FL (ref 9.4–12.4)
POTASSIUM SERPL-SCNC: 4.1 MEQ/L (ref 3.5–5.2)
RBC # BLD: 4 MILL/MM3 (ref 4.2–5.4)
RH FACTOR: NORMAL
SEG NEUTROPHILS: 67.5 %
SEGMENTED NEUTROPHILS ABSOLUTE COUNT: 6 THOU/MM3 (ref 1.8–7.7)
SODIUM BLD-SCNC: 138 MEQ/L (ref 135–145)
TOTAL PROTEIN: 6.7 G/DL (ref 6.1–8)
WBC # BLD: 8.9 THOU/MM3 (ref 4.8–10.8)

## 2020-01-01 PROCEDURE — 36415 COLL VENOUS BLD VENIPUNCTURE: CPT

## 2020-01-01 PROCEDURE — 86901 BLOOD TYPING SEROLOGIC RH(D): CPT

## 2020-01-01 PROCEDURE — 76815 OB US LIMITED FETUS(S): CPT

## 2020-01-01 PROCEDURE — 85025 COMPLETE CBC W/AUTO DIFF WBC: CPT

## 2020-01-01 PROCEDURE — 99284 EMERGENCY DEPT VISIT MOD MDM: CPT

## 2020-01-01 PROCEDURE — 86900 BLOOD TYPING SEROLOGIC ABO: CPT

## 2020-01-01 PROCEDURE — 80053 COMPREHEN METABOLIC PANEL: CPT

## 2020-01-01 RX ORDER — OXYCODONE HYDROCHLORIDE AND ACETAMINOPHEN 5; 325 MG/1; MG/1
1 TABLET ORAL EVERY 8 HOURS PRN
COMMUNITY
End: 2020-01-24

## 2020-01-01 ASSESSMENT — ENCOUNTER SYMPTOMS
VOMITING: 0
SHORTNESS OF BREATH: 0
ABDOMINAL PAIN: 1
NAUSEA: 0

## 2020-01-01 NOTE — ED NOTES
Pt resting comfortably on cot talking to family at bedside. VS stable. Pt denies additional needs at this time. Pt updated on POC and waiting resuls.       Arneta Schwab, RN  01/01/20 5257
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
## 2020-01-01 NOTE — ED NOTES
Attempted to find fetal heart tones at this time - was unable to find. Obi Arana notified. Pt reports that this is normal and they always have difficulty at the OB.       Elizabeth Olmedo RN  01/01/20 1915

## 2020-01-01 NOTE — ED NOTES
Bed: 022A  Expected date:   Expected time:   Means of arrival: [facility]  Comments:     Deja Patel RN  01/01/20 8806

## 2020-01-01 NOTE — ED PROVIDER NOTES
Attending Supervising Physicians Attestation Statement  I was present with the physician assistant during the history and exam. I discussed the findings and plans with the physician assistant and agree as documented in her note     Electronically signed by Mary Valle MD on 1/1/20 at 4:40 PM          Julia Chahal MD  01/01/20 9272

## 2020-01-02 ENCOUNTER — TELEPHONE (OUTPATIENT)
Dept: FAMILY MEDICINE CLINIC | Age: 25
End: 2020-01-02

## 2020-01-05 ENCOUNTER — HOSPITAL ENCOUNTER (EMERGENCY)
Age: 25
Discharge: HOME | End: 2020-01-06
Attending: FAMILY MEDICINE
Payer: COMMERCIAL

## 2020-01-05 LAB
BASOPHILS # BLD: 0.3 %
BASOPHILS ABSOLUTE: 0 THOU/MM3 (ref 0–0.1)
EOSINOPHIL # BLD: 0.1 %
EOSINOPHILS ABSOLUTE: 0 THOU/MM3 (ref 0–0.4)
ERYTHROCYTE [DISTWIDTH] IN BLOOD BY AUTOMATED COUNT: 13.2 % (ref 11.5–14.5)
ERYTHROCYTE [DISTWIDTH] IN BLOOD BY AUTOMATED COUNT: 45.1 FL (ref 35–45)
HCT VFR BLD CALC: 42.8 % (ref 37–47)
HEMOGLOBIN: 14.3 GM/DL (ref 12–16)
IMMATURE GRANS (ABS): 0.1 THOU/MM3 (ref 0–0.07)
IMMATURE GRANULOCYTES: 0.7 %
LYMPHOCYTES # BLD: 6.6 %
LYMPHOCYTES ABSOLUTE: 1 THOU/MM3 (ref 1–4.8)
MCH RBC QN AUTO: 31.3 PG (ref 26–33)
MCHC RBC AUTO-ENTMCNC: 33.4 GM/DL (ref 32.2–35.5)
MCV RBC AUTO: 93.7 FL (ref 81–99)
MONOCYTES # BLD: 3.7 %
MONOCYTES ABSOLUTE: 0.6 THOU/MM3 (ref 0.4–1.3)
NUCLEATED RED BLOOD CELLS: 0 /100 WBC
PLATELET # BLD: 219 THOU/MM3 (ref 130–400)
PMV BLD AUTO: 10.1 FL (ref 9.4–12.4)
RBC # BLD: 4.57 MILL/MM3 (ref 4.2–5.4)
SEG NEUTROPHILS: 88.6 %
SEGMENTED NEUTROPHILS ABSOLUTE COUNT: 13.4 THOU/MM3 (ref 1.8–7.7)
WBC # BLD: 15.1 THOU/MM3 (ref 4.8–10.8)

## 2020-01-05 PROCEDURE — 84702 CHORIONIC GONADOTROPIN TEST: CPT

## 2020-01-05 PROCEDURE — 85025 COMPLETE CBC W/AUTO DIFF WBC: CPT

## 2020-01-05 PROCEDURE — 99284 EMERGENCY DEPT VISIT MOD MDM: CPT

## 2020-01-05 PROCEDURE — 36415 COLL VENOUS BLD VENIPUNCTURE: CPT

## 2020-01-05 PROCEDURE — 81001 URINALYSIS AUTO W/SCOPE: CPT

## 2020-01-05 PROCEDURE — 80053 COMPREHEN METABOLIC PANEL: CPT

## 2020-01-05 PROCEDURE — 87086 URINE CULTURE/COLONY COUNT: CPT

## 2020-01-05 ASSESSMENT — PAIN DESCRIPTION - LOCATION: LOCATION: ABDOMEN

## 2020-01-05 ASSESSMENT — PAIN DESCRIPTION - ORIENTATION: ORIENTATION: LEFT;LOWER

## 2020-01-05 ASSESSMENT — PAIN SCALES - GENERAL: PAINLEVEL_OUTOF10: 8

## 2020-01-06 ENCOUNTER — APPOINTMENT (OUTPATIENT)
Dept: ULTRASOUND IMAGING | Age: 25
End: 2020-01-06
Payer: COMMERCIAL

## 2020-01-06 VITALS
RESPIRATION RATE: 15 BRPM | SYSTOLIC BLOOD PRESSURE: 107 MMHG | DIASTOLIC BLOOD PRESSURE: 75 MMHG | HEART RATE: 115 BPM | OXYGEN SATURATION: 97 % | TEMPERATURE: 98 F

## 2020-01-06 LAB
ALBUMIN SERPL-MCNC: 4.2 G/DL (ref 3.5–5.1)
ALP BLD-CCNC: 41 U/L (ref 38–126)
ALT SERPL-CCNC: 6 U/L (ref 11–66)
ANION GAP SERPL CALCULATED.3IONS-SCNC: 15 MEQ/L (ref 8–16)
AST SERPL-CCNC: 12 U/L (ref 5–40)
BACTERIA: ABNORMAL /HPF
BILIRUB SERPL-MCNC: 0.4 MG/DL (ref 0.3–1.2)
BILIRUBIN URINE: NEGATIVE
BLOOD, URINE: ABNORMAL
BUN BLDV-MCNC: 9 MG/DL (ref 7–22)
CALCIUM SERPL-MCNC: 10 MG/DL (ref 8.5–10.5)
CASTS 2: ABNORMAL /LPF
CASTS UA: ABNORMAL /LPF
CHARACTER, URINE: ABNORMAL
CHLORIDE BLD-SCNC: 100 MEQ/L (ref 98–111)
CO2: 20 MEQ/L (ref 23–33)
COLOR: YELLOW
CREAT SERPL-MCNC: 0.6 MG/DL (ref 0.4–1.2)
CRYSTALS, UA: ABNORMAL
EPITHELIAL CELLS, UA: ABNORMAL /HPF
GFR SERPL CREATININE-BSD FRML MDRD: > 90 ML/MIN/1.73M2
GLUCOSE BLD-MCNC: 96 MG/DL (ref 70–108)
GLUCOSE URINE: NEGATIVE MG/DL
HCG,BETA SUBUNIT,QUAL,SERUM: 4202 MIU/ML (ref 0–5)
KETONES, URINE: >= 160
LEUKOCYTE ESTERASE, URINE: ABNORMAL
MISCELLANEOUS 2: ABNORMAL
NITRITE, URINE: NEGATIVE
OSMOLALITY CALCULATION: 268.6 MOSMOL/KG (ref 275–300)
PH UA: 5.5 (ref 5–9)
POTASSIUM SERPL-SCNC: 4.6 MEQ/L (ref 3.5–5.2)
PROTEIN UA: 300
RBC URINE: ABNORMAL /HPF
RENAL EPITHELIAL, UA: ABNORMAL
SODIUM BLD-SCNC: 135 MEQ/L (ref 135–145)
SPECIFIC GRAVITY, URINE: > 1.03 (ref 1–1.03)
TOTAL PROTEIN: 7.6 G/DL (ref 6.1–8)
UROBILINOGEN, URINE: 1 EU/DL (ref 0–1)
WBC UA: ABNORMAL /HPF
YEAST: ABNORMAL

## 2020-01-06 PROCEDURE — 96374 THER/PROPH/DIAG INJ IV PUSH: CPT

## 2020-01-06 PROCEDURE — 96375 TX/PRO/DX INJ NEW DRUG ADDON: CPT

## 2020-01-06 PROCEDURE — 2580000003 HC RX 258: Performed by: FAMILY MEDICINE

## 2020-01-06 PROCEDURE — 76775 US EXAM ABDO BACK WALL LIM: CPT

## 2020-01-06 PROCEDURE — 6360000002 HC RX W HCPCS: Performed by: FAMILY MEDICINE

## 2020-01-06 PROCEDURE — 76815 OB US LIMITED FETUS(S): CPT

## 2020-01-06 RX ORDER — MORPHINE SULFATE 2 MG/ML
2 INJECTION, SOLUTION INTRAMUSCULAR; INTRAVENOUS ONCE
Status: COMPLETED | OUTPATIENT
Start: 2020-01-06 | End: 2020-01-06

## 2020-01-06 RX ORDER — ONDANSETRON 2 MG/ML
4 INJECTION INTRAMUSCULAR; INTRAVENOUS ONCE
Status: COMPLETED | OUTPATIENT
Start: 2020-01-06 | End: 2020-01-06

## 2020-01-06 RX ORDER — 0.9 % SODIUM CHLORIDE 0.9 %
500 INTRAVENOUS SOLUTION INTRAVENOUS ONCE
Status: COMPLETED | OUTPATIENT
Start: 2020-01-06 | End: 2020-01-06

## 2020-01-06 RX ORDER — ONDANSETRON 4 MG/1
4 TABLET, FILM COATED ORAL EVERY 8 HOURS PRN
Qty: 9 TABLET | Refills: 0 | Status: SHIPPED | OUTPATIENT
Start: 2020-01-06 | End: 2020-03-09

## 2020-01-06 RX ORDER — SODIUM CHLORIDE 9 MG/ML
INJECTION, SOLUTION INTRAVENOUS CONTINUOUS
Status: DISCONTINUED | OUTPATIENT
Start: 2020-01-06 | End: 2020-01-06 | Stop reason: HOSPADM

## 2020-01-06 RX ADMIN — SODIUM CHLORIDE 500 ML: 9 INJECTION, SOLUTION INTRAVENOUS at 01:31

## 2020-01-06 RX ADMIN — ONDANSETRON 4 MG: 2 INJECTION INTRAMUSCULAR; INTRAVENOUS at 01:31

## 2020-01-06 RX ADMIN — MORPHINE SULFATE 2 MG: 2 INJECTION, SOLUTION INTRAMUSCULAR; INTRAVENOUS at 01:31

## 2020-01-06 ASSESSMENT — ENCOUNTER SYMPTOMS
ABDOMINAL PAIN: 1
VOMITING: 1
SHORTNESS OF BREATH: 0
DIARRHEA: 1
NAUSEA: 1

## 2020-01-06 ASSESSMENT — PAIN SCALES - GENERAL: PAINLEVEL_OUTOF10: 10

## 2020-01-06 NOTE — ED PROVIDER NOTES
Janelle Gue[facility] COMPLAINT       Chief Complaint   Patient presents with    Abdominal Pain    Emesis       Nurses Notes reviewed and I agree except as noted in the HPI. HISTORY OF PRESENT Kathie Belle is a 27 y.o. female who presents to the Emergency Department from home for the evaluation of nausea, vomiting and abdominal pain. Patient states her pain is in her left lower back and it wraps around to the left side of her abdomen. She states she had a stent placed on 2019 since she has a 12 mm stone. She states she follows with Urology and is due to see them on 2020. Patient rates her current pain as an 8/10 in severity and she describes it as a constant shooting pain. Patient states her pain is usually a 3/10 in severity. Patient has tried taking extra strength Tylenol for the pain with no relief. She has also tried taking Reglan for her nausea with no relief. She states she is unable to keep anything down. Patient states she is currently 18 weeks pregnant and she has an upcoming OB appointment on 2020 with Dr. Juliocesar Juarez. She is A1. Patient denies vaginal bleeding or problems with her pregnancy. She states she has had dark urine but denies dysuria. No further complaints at the time of the initial encounter. Location/Symptom: left lower back that wraps around left lower abdomen   Timing/Onset: since she had a stent placed on 2019 for a 12 mm stone  Context/Setting: currently 18 weeks pregnant   Quality: shooting  Duration: constant  Modifying Factors: denies  Severity: 8/10    The HPI was provided by the patient. REVIEW OF SYSTEMS     Review of Systems   Constitutional: Negative for chills, diaphoresis and fever. HENT: Negative for congestion. Respiratory: Negative for shortness of breath. Cardiovascular: Negative for chest pain.    Gastrointestinal: Positive for abdominal pain, diarrhea, (*)     All other components within normal limits   HCG, QUANTITATIVE, PREGNANCY - Abnormal; Notable for the following components:    hCG,Beta Subunit,Qual,Serum 4202.0 (*)     All other components within normal limits   OSMOLALITY - Abnormal; Notable for the following components:    Osmolality Calc 268.6 (*)     All other components within normal limits   URINE WITH REFLEXED MICRO - Abnormal; Notable for the following components:    Ketones, Urine >= 160 (*)     Specific Gravity, Urine > 1.030 (*)     Blood, Urine LARGE (*)     Protein,  (*)     Leukocyte Esterase, Urine SMALL (*)     Character, Urine CLOUDY (*)     All other components within normal limits   CULTURE, REFLEXED, URINE   ANION GAP   GLOMERULAR FILTRATION RATE, ESTIMATED       EMERGENCYDEPARTMENT COURSE:   Vitals:    Vitals:    01/05/20 2329 01/06/20 0140   BP: 115/80 107/75   Pulse: 117 115   Resp: 18 15   Temp: 98 °F (36.7 °C)    TempSrc: Oral    SpO2: 97% 97%       12:41 AM: The patient was seen and evaluated. Nursing notes reviewed    IV hydration    Zofran plus morphine IV    Ultrasound of the kidney as well as fetal ultrasound were obtained    Patient is feeling a lot better    We will discharge home for outpatient follow-up    CRITICAL CARE:   None    CONSULTS:  Dr. Billy Harper:  None     FINAL IMPRESSION      1.  Renal colic on left side          DISPOSITION/PLAN     Follow with her OB doctor as scheduled    Follow with urology as scheduled    PATIENT REFERRED TO:  Lorena Cooper MD  [facility] [address] [place] [address] [place]  [phone]      As scheduled    Follow-up with urologist as scheduled            DISCHARGE MEDICATIONS:  New Prescriptions    ONDANSETRON (ZOFRAN) 4 MG TABLET    Take 1 tablet by mouth every 8 hours as needed for Nausea       (Please note that portions of this note were completed with a voice recognition program.  Efforts were made to edit the dictations but occasionally words aremis-transcribed.)    Scribe:  Ashley Xavier 1/6/20 12:41 AM Scribing for and in the presence of Rene Thorpe MD.    Signed by: Duane Everett, 01/06/20 2:24 AM    Provider:  I personally performed theservices described in the documentation, reviewed and edited the documentation which was dictated to the scribe in my presence, and it accurately records my words and actions.     Rene Thorpe MD 1/6/20 2:24 AM        Rene Thorpe MD  01/06/20 Eelna StrStewart [address], MD  01/06/20 4989

## 2020-01-06 NOTE — ED TRIAGE NOTES
Pt comes in with left lower abdominal pain and emesis that started this morning. She states she has a history of kidney stones and had a stent placed on 12/2/19. Pt is 18 weeks pregnant.

## 2020-01-07 ENCOUNTER — TELEPHONE (OUTPATIENT)
Dept: UROLOGY | Age: 25
End: 2020-01-07

## 2020-01-07 LAB
ORGANISM: ABNORMAL
URINE CULTURE REFLEX: ABNORMAL

## 2020-01-07 NOTE — TELEPHONE ENCOUNTER
Spoke to Mildred. At [facility] [phone]. Tiki prieto is req for the outpatient surgery CPT_52315, [id number]. Scheduled at [address] [place] [address] with Dr Fred Cheek on 1/20/2020. Call ref#-[id number].

## 2020-01-08 ENCOUNTER — TELEPHONE (OUTPATIENT)
Dept: FAMILY MEDICINE CLINIC | Age: 25
End: 2020-01-08

## 2020-01-08 NOTE — LETTER
[address]  [address] [facility] , [address]  Phone: [phone]  Fax: [phone]    January 8, 2020    Heather [address] [facility]      Dear Tnavi Suarez,    Thank you for choosing [facility] Emergency Department on 1/6/20. Dr. Peter Caceres wanted to make sure that you understand your discharge instructions and that you were able to fill any prescriptions that may have been ordered for you. Please contact the office at the above phone number if the ED advised to you follow up with Dr. Peter Caceres, or if you have any further questions or needs. Also did you know -   *Visiting the ED for a non-emergency could result in higher co-pays than you would normally be subject to paying? *You can call your doctor even after hours so they can direct you to the most appropriate care. [facility]) practices can often offer you an appointment on the same day that you call. *We have some [facility] offices that offer Walk-in appointments; check our website for availability in your community, [email or web address].      *Evisits are now available for patients for $36 through Practice Management e-Tools for certain conditions:  * Sinus, cold and or cough       * Diarrhea            * Headache       *vaginal discharge     *flu symptoms  * Heartburn                                * Poison Meena          * Back pain     * Urinary problems                               If you do not have [employer] and are interested, please contact the office and a staff member may assist you or go to [email or web address].     Sincerely,     Peter Caceres MD and your [facility]

## 2020-01-22 ENCOUNTER — TELEPHONE (OUTPATIENT)
Dept: UROLOGY | Age: 25
End: 2020-01-22

## 2020-01-22 NOTE — TELEPHONE ENCOUNTER
Brenda called into office. Needing to reschedule the cystoscopy with ureteral stent exchange with Dr Eunice Kidd on 1/20/2020. She no showed for. When asked why she did not show for surgery. She explained she forgot, as it was her son's birthday. I explained to her that I would have to speak to my , Marianna Sexton,  regarding the rescheduling date for the procedure. Ciro Kidd.

## 2020-01-23 ENCOUNTER — PREP FOR PROCEDURE (OUTPATIENT)
Dept: UROLOGY | Age: 25
End: 2020-01-23

## 2020-01-23 ENCOUNTER — TELEPHONE (OUTPATIENT)
Dept: UROLOGY | Age: 25
End: 2020-01-23

## 2020-01-23 RX ORDER — SODIUM CHLORIDE 9 MG/ML
INJECTION, SOLUTION INTRAVENOUS CONTINUOUS
Status: CANCELLED | OUTPATIENT
Start: 2020-01-27

## 2020-01-23 NOTE — TELEPHONE ENCOUNTER
Joan Burgos was notified that Dr Guerin Room has the cystoscopy with left ureteral stent exchange with ultrasound guidance rescheduled at [facility] 1/27/2020. Patient was advised to arrive 0600 second floor. NPO 8 hours prior. Consent to be signed on arrival.  Joan Burgos was advised to bring a  to the hospital.    Spoke to Windy chowdhury in ultrasound dept for the machine. Spoke to Bam lowry at [facility] office to update.

## 2020-01-23 NOTE — TELEPHONE ENCOUNTER
SURGERY [address] [place] [address] [address]      Phone *[phone] *[phone]   Surgical Scheduling Direct Line Phone *[phone] Fax *[phone]      Jerod Morales 1995 female    [address] [facility]  [employer] [place] [address]   Marital Status:          Home Phone: [phone]      Cell Phone:    Telephone Information:   Mobile [phone]          Surgeon: Dr. Jane Frazier  Surgery Date: 1/27/2020   Time: 0730    Procedure: Cystoscopy with left ureteral stent exchange under ultrasound guidance (15 min case per Dr Wendy Beauchamp)    Diagnosis: Left kidney stone     Important Medical History: intra-uterine pregnancy, OB to be consulted for fetal monitoring    Special Inst/Equip: Regular Room    CPT Codes:    99545, 13362  Latex Allergy:  No     Cardiac Device:  No     Anesthesia:  MAC          Admission Type:  Same Day                             Admit Prior to Day of Surgery: No    Case Location:  Main OR           Preadmission Testing:Phone Call              PAT Date and Time:______________________________________________________    PAT Confirmation #: ______________________________________________________    Post Op Visit: ___________________________________________________________    Need Preop Cardiac Clearance: No    Does Patient have Cardiologist/physician?     unknown    Surgery Confirmation #: __________________________________________________    : ________________________   Date: __________________________     [employer] Name: [facility] [place]

## 2020-01-24 RX ORDER — ACETAMINOPHEN 500 MG
500 TABLET ORAL EVERY 6 HOURS PRN
COMMUNITY
End: 2020-03-09

## 2020-01-24 NOTE — PROGRESS NOTES
NPO after midnight  [employer] and drivers license  Wear comfortable clean clothing  Do not bring jewelry  Shower night before and morning of surgery with a liquid antibacterial soap  Bring  medications   Follow all instructions given by your physician   needed at discharge  Call [phone] for any questions

## 2020-01-27 ENCOUNTER — TELEPHONE (OUTPATIENT)
Dept: UROLOGY | Age: 25
End: 2020-01-27

## 2020-01-27 ENCOUNTER — ANESTHESIA (OUTPATIENT)
Dept: OPERATING ROOM | Age: 25
End: 2020-01-27
Payer: COMMERCIAL

## 2020-01-27 ENCOUNTER — ANESTHESIA EVENT (OUTPATIENT)
Dept: OPERATING ROOM | Age: 25
End: 2020-01-27
Payer: COMMERCIAL

## 2020-01-27 ENCOUNTER — HOSPITAL ENCOUNTER (OUTPATIENT)
Age: 25
Setting detail: OUTPATIENT SURGERY
Discharge: HOME | End: 2020-01-27
Attending: UROLOGY | Admitting: UROLOGY
Payer: COMMERCIAL

## 2020-01-27 ENCOUNTER — APPOINTMENT (OUTPATIENT)
Dept: ULTRASOUND IMAGING | Age: 25
End: 2020-01-27
Attending: UROLOGY
Payer: COMMERCIAL

## 2020-01-27 VITALS
HEART RATE: 73 BPM | TEMPERATURE: 97.9 F | DIASTOLIC BLOOD PRESSURE: 78 MMHG | BODY MASS INDEX: 28.51 KG/M2 | WEIGHT: 151 LBS | SYSTOLIC BLOOD PRESSURE: 112 MMHG | RESPIRATION RATE: 16 BRPM | OXYGEN SATURATION: 98 % | HEIGHT: 61 IN

## 2020-01-27 VITALS — DIASTOLIC BLOOD PRESSURE: 70 MMHG | SYSTOLIC BLOOD PRESSURE: 111 MMHG | OXYGEN SATURATION: 96 %

## 2020-01-27 PROCEDURE — 2709999900 HC NON-CHARGEABLE SUPPLY: Performed by: UROLOGY

## 2020-01-27 PROCEDURE — 2580000003 HC RX 258

## 2020-01-27 PROCEDURE — C2617 STENT, NON-COR, TEM W/O DEL: HCPCS | Performed by: UROLOGY

## 2020-01-27 PROCEDURE — 6360000002 HC RX W HCPCS

## 2020-01-27 PROCEDURE — C1769 GUIDE WIRE: HCPCS | Performed by: UROLOGY

## 2020-01-27 PROCEDURE — 7100000010 HC PHASE II RECOVERY - FIRST 15 MIN: Performed by: UROLOGY

## 2020-01-27 PROCEDURE — 7100000011 HC PHASE II RECOVERY - ADDTL 15 MIN: Performed by: UROLOGY

## 2020-01-27 PROCEDURE — 3600000013 HC SURGERY LEVEL 3 ADDTL 15MIN: Performed by: UROLOGY

## 2020-01-27 PROCEDURE — 3600000003 HC SURGERY LEVEL 3 BASE: Performed by: UROLOGY

## 2020-01-27 PROCEDURE — 3700000000 HC ANESTHESIA ATTENDED CARE: Performed by: UROLOGY

## 2020-01-27 PROCEDURE — 76775 US EXAM ABDO BACK WALL LIM: CPT

## 2020-01-27 PROCEDURE — 3700000001 HC ADD 15 MINUTES (ANESTHESIA): Performed by: UROLOGY

## 2020-01-27 RX ORDER — FENTANYL CITRATE 50 UG/ML
INJECTION, SOLUTION INTRAMUSCULAR; INTRAVENOUS PRN
Status: DISCONTINUED | OUTPATIENT
Start: 2020-01-27 | End: 2020-01-27 | Stop reason: SDUPTHER

## 2020-01-27 RX ORDER — SODIUM CHLORIDE 9 MG/ML
INJECTION, SOLUTION INTRAVENOUS CONTINUOUS
Status: DISCONTINUED | OUTPATIENT
Start: 2020-01-27 | End: 2020-01-27 | Stop reason: HOSPADM

## 2020-01-27 RX ORDER — ONDANSETRON 2 MG/ML
INJECTION INTRAMUSCULAR; INTRAVENOUS PRN
Status: DISCONTINUED | OUTPATIENT
Start: 2020-01-27 | End: 2020-01-27 | Stop reason: SDUPTHER

## 2020-01-27 RX ORDER — PROPOFOL 10 MG/ML
INJECTION, EMULSION INTRAVENOUS PRN
Status: DISCONTINUED | OUTPATIENT
Start: 2020-01-27 | End: 2020-01-27 | Stop reason: SDUPTHER

## 2020-01-27 RX ORDER — HYDROCODONE BITARTRATE AND ACETAMINOPHEN 5; 325 MG/1; MG/1
1 TABLET ORAL EVERY 4 HOURS PRN
Qty: 10 TABLET | Refills: 0 | Status: SHIPPED | OUTPATIENT
Start: 2020-01-27 | End: 2020-01-30

## 2020-01-27 RX ORDER — PROPOFOL 10 MG/ML
INJECTION, EMULSION INTRAVENOUS CONTINUOUS PRN
Status: DISCONTINUED | OUTPATIENT
Start: 2020-01-27 | End: 2020-01-27 | Stop reason: SDUPTHER

## 2020-01-27 RX ORDER — ONDANSETRON 2 MG/ML
4 INJECTION INTRAMUSCULAR; INTRAVENOUS
Status: DISCONTINUED | OUTPATIENT
Start: 2020-01-27 | End: 2020-01-27 | Stop reason: HOSPADM

## 2020-01-27 RX ORDER — MEPERIDINE HYDROCHLORIDE 25 MG/ML
12.5 INJECTION INTRAMUSCULAR; INTRAVENOUS; SUBCUTANEOUS EVERY 5 MIN PRN
Status: DISCONTINUED | OUTPATIENT
Start: 2020-01-27 | End: 2020-01-27 | Stop reason: HOSPADM

## 2020-01-27 RX ORDER — OXYCODONE HYDROCHLORIDE AND ACETAMINOPHEN 5; 325 MG/1; MG/1
1 TABLET ORAL EVERY 4 HOURS PRN
COMMUNITY
End: 2020-01-31 | Stop reason: SDUPTHER

## 2020-01-27 RX ORDER — DEXAMETHASONE SODIUM PHOSPHATE 4 MG/ML
INJECTION, SOLUTION INTRA-ARTICULAR; INTRALESIONAL; INTRAMUSCULAR; INTRAVENOUS; SOFT TISSUE PRN
Status: DISCONTINUED | OUTPATIENT
Start: 2020-01-27 | End: 2020-01-27 | Stop reason: SDUPTHER

## 2020-01-27 RX ORDER — FENTANYL CITRATE 50 UG/ML
50 INJECTION, SOLUTION INTRAMUSCULAR; INTRAVENOUS EVERY 5 MIN PRN
Status: DISCONTINUED | OUTPATIENT
Start: 2020-01-27 | End: 2020-01-27 | Stop reason: HOSPADM

## 2020-01-27 RX ADMIN — FENTANYL CITRATE 50 MCG: 50 INJECTION, SOLUTION INTRAMUSCULAR; INTRAVENOUS at 07:35

## 2020-01-27 RX ADMIN — PROPOFOL 50 MG: 10 INJECTION, EMULSION INTRAVENOUS at 07:35

## 2020-01-27 RX ADMIN — CEFAZOLIN 2 G: 10 INJECTION, POWDER, FOR SOLUTION INTRAVENOUS at 07:35

## 2020-01-27 RX ADMIN — PROPOFOL 50 MCG/KG/MIN: 10 INJECTION, EMULSION INTRAVENOUS at 07:37

## 2020-01-27 RX ADMIN — PROPOFOL 50 MG: 10 INJECTION, EMULSION INTRAVENOUS at 07:43

## 2020-01-27 RX ADMIN — SODIUM CHLORIDE: 9 INJECTION, SOLUTION INTRAVENOUS at 06:22

## 2020-01-27 RX ADMIN — ONDANSETRON HYDROCHLORIDE 4 MG: 4 INJECTION, SOLUTION INTRAMUSCULAR; INTRAVENOUS at 07:43

## 2020-01-27 RX ADMIN — DEXAMETHASONE SODIUM PHOSPHATE 4 MG: 4 INJECTION, SOLUTION INTRAMUSCULAR; INTRAVENOUS at 07:43

## 2020-01-27 ASSESSMENT — PULMONARY FUNCTION TESTS
PIF_VALUE: 0
PIF_VALUE: 1
PIF_VALUE: 0
PIF_VALUE: 1
PIF_VALUE: 0

## 2020-01-27 ASSESSMENT — PAIN DESCRIPTION - ORIENTATION: ORIENTATION: LEFT

## 2020-01-27 ASSESSMENT — PAIN DESCRIPTION - PAIN TYPE: TYPE: SURGICAL PAIN

## 2020-01-27 ASSESSMENT — PAIN SCALES - GENERAL
PAINLEVEL_OUTOF10: 7
PAINLEVEL_OUTOF10: 6
PAINLEVEL_OUTOF10: 7

## 2020-01-27 ASSESSMENT — PAIN DESCRIPTION - LOCATION
LOCATION: FLANK
LOCATION: FLANK

## 2020-01-27 ASSESSMENT — PAIN - FUNCTIONAL ASSESSMENT: PAIN_FUNCTIONAL_ASSESSMENT: 0-10

## 2020-01-27 NOTE — TELEPHONE ENCOUNTER
DO NOT TAKE ASPIRIN, MULTIVITAMINS, OR MOTRIN-LIKE DRUGS 7 DAYS PRIOR TO SURGERY AND 3 DAYS FOLLOWING     Jerod Morales 1995 Diagnosis: Kidney stone    Surgical Physician: Dr. Jesus Altamirano have been scheduled for the procedure marked below:      Surgery: Cystoscopy with ultrasound guided left ureteral stent exchange           Date: 03-     Anesthesia: Anesthesiologist (General/Spinal)     Place of Service: [address] [place] [address]         Please be at the Outpatient Department Second Floor Same Day Surgery by:  12:30pm        INSTRUCTIONS AS MARKED BELOW:    1. DO NOT eat or drink anything after midnight before surgery. 2. We prefer you shower or bathe with an antibacterial soap (Dial) the morning of surgery. 3.  Please ensure to have a  with you to transport you home. 4.  Please bring a current medication list, photo ID and insurance card(s) with you  5. Okay to take Tylenol  6. If you take Glucophage or Metformin, hold 48-hours prior to surgery  7. Take blood pressure or heart medication as directed, if taken in the morning take with a small sip of water  8. PLEASE BRING THIS LETTER WITH YOU AND SHOW IT TO THE  AT [facility]. 9.  Please send a copy to the Family Dr: Brittney Vargas MD  10. You should receive a follow up appointment upon discharge from the hospital.  If you do not the office will call in 1-2 days to schedule.     Date: 1/27/2020

## 2020-01-27 NOTE — OP NOTE
FACILITY:  [address] [place] [address] [address] [facility], [address] [facility]  1995  [id number]    DATE: 01/27/20  SURGEON:  Dr. Octavia Ghotra MD  ASSISTANT: Dr. Mari REY MD  PREOPERATIVE DIAGNOSIS: Left ureteral obstruction   POSTOPERATIVE DIAGNOSIS: Same  PROCEDURES PERFORMED:  1. Cystoscopy. 2. Left sided stent exchange (under ultrasound)  DRAINS: Left 6x26 cm cm double-J ureteral stent  SPECIMEN: none  ANESTHESIA: MAC  ESTIMATED BLOOD LOSS: None.   COMPLICATIONS: None.      INDICATIONS FOR PROCEDURE:  Keyshawn Victor is a 27 y.o. female presents for Left ureteral stent exchange. After the risks, benefits, alternatives, of the procedure were discussed with the patient, informed consent was obtained. The patient elected to proceed.      DETAILS OF THE PROCEDURE:  The patient was brought back to the preoperative holding area to the operating suite, and was transferred to the operating table where the patient lay in supine position. General endotracheal anesthesia was induced, and patient was prepped and draped in standard surgical fashion after being placed in dorsolithotomy position. A proper timeout was performed, preoperative antibiotics were given. A rigid cystoscope with a 22 Bahraini sheath with 30 degree lens was inserted in the patient's urethral and into the bladder with ease. We then focused our attention on the Left ureteral orifice,  The stent had significant encrustation, we grasped the stent and pulled to the meatus, which we cannulated with our glidewire. Over our glidewire we placed a 6x26 cm double-J ureteral stent and we noted appropriate placement in the upper collecting system using fluoroscopy. We then removed our wire. There was good proximal and distal curl. We then drained the patient's bladder and removed the scope and the procedure was subsequently terminated.      Plan:   The patient will be discharged home in good condition.   The patient will need to follow up for stent

## 2020-01-27 NOTE — ANESTHESIA PRE PROCEDURE
Metabolic acidosis J76.2    Marijuana abuse F12.10    Kidney stone N20.0       Past Medical History:        Diagnosis Date    Kidney stone     rt    PONV (postoperative nausea and vomiting)        Past Surgical History:        Procedure Laterality Date    CYSTO/URETERO/PYELOSCOPY, CALCULUS TX Left 2019    CYSTOCOPY, LEFT STENT placement  performed by Leonela Montes MD at [address]  12/21/15    miscarriage    OTHER SURGICAL HISTORY  2015    Cystoscopy with right ureteroscopy basket stone removal stent placement (Dr. Noman Diaz, [facility])    TONSILLECTOMY      TYMPANOSTOMY TUBE PLACEMENT         Social History:    Social History     Tobacco Use    Smoking status: Former Smoker     Types: Cigarettes     Last attempt to quit: 2019     Years since quittin.1    Smokeless tobacco: Never Used    Tobacco comment: Vape   Substance Use Topics    Alcohol use: No                                Counseling given: Not Answered  Comment: Vape      Vital Signs (Current):   Vitals:    20 1151 20 0556 20 0600   BP:  110/69    Pulse:  79    Resp:  16    Temp:  97.9 °F (36.6 °C)    SpO2:  97%    Weight: 146 lb (66.2 kg)  151 lb (68.5 kg)   Height: 5' 1\" (1.549 m)  5' 1\" (1.549 m)                                              BP Readings from Last 3 Encounters:   20 110/69   20 107/75   20 104/68       NPO Status: Time of last liquid consumption:                         Time of last solid consumption:                         Date of last liquid consumption: 20                        Date of last solid food consumption: 20    BMI:   Wt Readings from Last 3 Encounters:   20 151 lb (68.5 kg)   20 154 lb (69.9 kg)   12/15/19 154 lb 5.2 oz (70 kg)     Body mass index is 28.53 kg/m².     CBC:   Lab Results   Component Value Date    WBC 15.1 2020    RBC 4.57 2020    HGB 14.3 2020    HCT administered. Anesthetic plan and risks discussed with patient. Plan discussed with CRNA.                   Amy Florentino DO   1/27/2020

## 2020-01-27 NOTE — H&P
[facility]  Urology H&P Note     Patient:  Jacek Verdugo  MRN: [id number]  YOB: 1995    ATTENDING: Randall Chaudhary     CHIEF COMPLAINT:  Kidney stone in pregnancy    HISTORY OF PRESENT ILLNESS:   The patient is a 27 y.o. female who presents with suspected kidney stone in pregnancy. She has a ureteral stent in place, and is undergoing exchange today. The patient is tolerating stent well. She missed her stent exchange last week. We discussed risks of stent exchange during pregnancy including risks of  labor and risk to pregnancy. The patient understands and elects to proceed. Patient's old records, notes and chart reviewed and summarized above. Past Medical History:    Past Medical History:   Diagnosis Date    Kidney stone     rt    PONV (postoperative nausea and vomiting)        Past Surgical History:    Past Surgical History:   Procedure Laterality Date    CYSTO/URETERO/PYELOSCOPY, CALCULUS TX Left 2019    CYSTOCOPY, LEFT STENT placement  performed by Annelise Vega MD at [address]  12/21/15    miscarriage    OTHER SURGICAL HISTORY  2015    Cystoscopy with right ureteroscopy basket stone removal stent placement (Dr. Anupam Mclaughlin, [facility])    TONSILLECTOMY      TYMPANOSTOMY TUBE PLACEMENT       Previous Urologic Surgery:  cystoscopy stent exchange  Medications Prior to Admission:    Prior to Admission medications    Medication Sig Start Date End Date Taking? Authorizing Provider   oxyCODONE-acetaminophen (PERCOCET) 5-325 MG per tablet Take 1 tablet by mouth every 4 hours as needed for Pain.    Yes Historical Provider, MD   acetaminophen (TYLENOL) 500 MG tablet Take 500 mg by mouth every 6 hours as needed for Pain   Yes Historical Provider, MD   ondansetron (ZOFRAN) 4 MG tablet Take 1 tablet by mouth every 8 hours as needed for Nausea 20  Yes Rene Thorpe MD folic acid (FOLVITE) 1 MG tablet Take 1 tablet by mouth daily 19  Yes Cass Aponte MD   metoclopramide (REGLAN) 10 MG tablet Take 1 tablet by mouth every 6 hours as needed (nausea) 19   Cass Aponte MD       Allergies:  Patient has no known allergies.     Social History:    Social History     Socioeconomic History    Marital status: Single     Spouse name: Not on file    Number of children: Not on file    Years of education: Not on file    Highest education level: Not on file   Occupational History    Not on file   Social Needs    Financial resource strain: Not on file    Food insecurity:     Worry: Not on file     Inability: Not on file    Transportation needs:     Medical: Not on file     Non-medical: Not on file   Tobacco Use    Smoking status: Former Smoker     Types: Cigarettes     Last attempt to quit: 2019     Years since quittin.1    Smokeless tobacco: Never Used    Tobacco comment: Vape   Substance and Sexual Activity    Alcohol use: No    Drug use: Yes     Types: Marijuana     Comment: LAST USED 2019    Sexual activity: Yes     Partners: Male   Lifestyle    Physical activity:     Days per week: Not on file     Minutes per session: Not on file    Stress: Not on file   Relationships    Social connections:     Talks on phone: Not on file     Gets together: Not on file     Attends Faith service: Not on file     Active member of club or organization: Not on file     Attends meetings of clubs or organizations: Not on file     Relationship status: Not on file    Intimate partner violence:     Fear of current or ex partner: Not on file     Emotionally abused: Not on file     Physically abused: Not on file     Forced sexual activity: Not on file   Other Topics Concern    Not on file   Social History Narrative    Not on file       Family History:    Family History   Problem Relation Age of Onset    Heart Disease Father     Heart Disease Paternal Grandfather

## 2020-01-31 ENCOUNTER — TELEPHONE (OUTPATIENT)
Dept: UROLOGY | Age: 25
End: 2020-01-31

## 2020-01-31 RX ORDER — OXYCODONE HYDROCHLORIDE AND ACETAMINOPHEN 5; 325 MG/1; MG/1
1 TABLET ORAL EVERY 4 HOURS PRN
Qty: 10 TABLET | Refills: 0 | Status: SHIPPED | OUTPATIENT
Start: 2020-01-31 | End: 2020-02-14 | Stop reason: SDUPTHER

## 2020-01-31 NOTE — TELEPHONE ENCOUNTER
Patient underwent Cystoscopy. Left sided stent exchange with Dr Beatrice Goodman on 01/27/2020. She is out of the norco and would like a refill. The pain is worst at night and in the morning. She has bad cramping. Tylenol is not helping. She uses a heating pad and hot showers. She has a follow up on 02/24/2020 and her next surgery 03/02/2020 for stent exchange. She denies fever or chills. Please advise. Thank you.

## 2020-02-06 ENCOUNTER — PREP FOR PROCEDURE (OUTPATIENT)
Dept: UROLOGY | Age: 25
End: 2020-02-06

## 2020-02-06 RX ORDER — SODIUM CHLORIDE 9 MG/ML
INJECTION, SOLUTION INTRAVENOUS CONTINUOUS
Status: CANCELLED | OUTPATIENT
Start: 2020-03-02

## 2020-02-12 ENCOUNTER — TELEPHONE (OUTPATIENT)
Dept: UROLOGY | Age: 25
End: 2020-02-12

## 2020-02-14 RX ORDER — OXYCODONE HYDROCHLORIDE AND ACETAMINOPHEN 5; 325 MG/1; MG/1
1 TABLET ORAL EVERY 6 HOURS PRN
Qty: 10 TABLET | Refills: 0 | Status: SHIPPED | OUTPATIENT
Start: 2020-02-14 | End: 2020-02-19

## 2020-02-24 NOTE — PROGRESS NOTES
NPO after midnight  [employer] and drivers license  Wear comfortable clean clothing  Do not bring jewelry  Shower night before and morning of surgery with a liquid antibacterial soap  Bring  medications   Follow all instructions given by your physician   needed at discharge  Call [phone] for any questions

## 2020-02-27 ENCOUNTER — TELEPHONE (OUTPATIENT)
Dept: UROLOGY | Age: 25
End: 2020-02-27

## 2020-03-02 ENCOUNTER — ANESTHESIA (OUTPATIENT)
Dept: OPERATING ROOM | Age: 25
End: 2020-03-02
Payer: COMMERCIAL

## 2020-03-02 ENCOUNTER — TELEPHONE (OUTPATIENT)
Dept: UROLOGY | Age: 25
End: 2020-03-02

## 2020-03-02 ENCOUNTER — ANESTHESIA EVENT (OUTPATIENT)
Dept: OPERATING ROOM | Age: 25
End: 2020-03-02
Payer: COMMERCIAL

## 2020-03-02 ENCOUNTER — HOSPITAL ENCOUNTER (OUTPATIENT)
Age: 25
Setting detail: OUTPATIENT SURGERY
Discharge: HOME | End: 2020-03-02
Attending: UROLOGY | Admitting: UROLOGY
Payer: COMMERCIAL

## 2020-03-02 ENCOUNTER — APPOINTMENT (OUTPATIENT)
Dept: ULTRASOUND IMAGING | Age: 25
End: 2020-03-02
Attending: UROLOGY
Payer: COMMERCIAL

## 2020-03-02 VITALS — OXYGEN SATURATION: 95 % | SYSTOLIC BLOOD PRESSURE: 100 MMHG | DIASTOLIC BLOOD PRESSURE: 62 MMHG

## 2020-03-02 VITALS
RESPIRATION RATE: 16 BRPM | TEMPERATURE: 97.2 F | DIASTOLIC BLOOD PRESSURE: 61 MMHG | HEART RATE: 71 BPM | SYSTOLIC BLOOD PRESSURE: 92 MMHG | BODY MASS INDEX: 28.32 KG/M2 | WEIGHT: 150 LBS | HEIGHT: 61 IN | OXYGEN SATURATION: 97 %

## 2020-03-02 PROCEDURE — 3600000013 HC SURGERY LEVEL 3 ADDTL 15MIN: Performed by: UROLOGY

## 2020-03-02 PROCEDURE — C2617 STENT, NON-COR, TEM W/O DEL: HCPCS | Performed by: UROLOGY

## 2020-03-02 PROCEDURE — 7100000011 HC PHASE II RECOVERY - ADDTL 15 MIN: Performed by: UROLOGY

## 2020-03-02 PROCEDURE — 3600000003 HC SURGERY LEVEL 3 BASE: Performed by: UROLOGY

## 2020-03-02 PROCEDURE — 6360000002 HC RX W HCPCS: Performed by: NURSE ANESTHETIST, CERTIFIED REGISTERED

## 2020-03-02 PROCEDURE — 2709999900 HC NON-CHARGEABLE SUPPLY: Performed by: UROLOGY

## 2020-03-02 PROCEDURE — 2580000003 HC RX 258

## 2020-03-02 PROCEDURE — 3700000000 HC ANESTHESIA ATTENDED CARE: Performed by: UROLOGY

## 2020-03-02 PROCEDURE — 7100000010 HC PHASE II RECOVERY - FIRST 15 MIN: Performed by: UROLOGY

## 2020-03-02 PROCEDURE — 76775 US EXAM ABDO BACK WALL LIM: CPT

## 2020-03-02 PROCEDURE — 3700000001 HC ADD 15 MINUTES (ANESTHESIA): Performed by: UROLOGY

## 2020-03-02 PROCEDURE — 6360000002 HC RX W HCPCS

## 2020-03-02 DEVICE — URETERAL STENT
Type: IMPLANTABLE DEVICE | Site: URETER | Status: FUNCTIONAL
Brand: PERCUFLEX™ PLUS

## 2020-03-02 RX ORDER — SODIUM CHLORIDE 9 MG/ML
INJECTION, SOLUTION INTRAVENOUS CONTINUOUS
Status: DISCONTINUED | OUTPATIENT
Start: 2020-03-02 | End: 2020-03-02 | Stop reason: HOSPADM

## 2020-03-02 RX ORDER — PROPOFOL 10 MG/ML
INJECTION, EMULSION INTRAVENOUS PRN
Status: DISCONTINUED | OUTPATIENT
Start: 2020-03-02 | End: 2020-03-02 | Stop reason: SDUPTHER

## 2020-03-02 RX ORDER — OXYCODONE HYDROCHLORIDE AND ACETAMINOPHEN 5; 325 MG/1; MG/1
1 TABLET ORAL EVERY 4 HOURS PRN
COMMUNITY
End: 2020-03-16

## 2020-03-02 RX ORDER — SCOLOPAMINE TRANSDERMAL SYSTEM 1 MG/1
PATCH, EXTENDED RELEASE TRANSDERMAL
Status: DISCONTINUED
Start: 2020-03-02 | End: 2020-03-02 | Stop reason: HOSPADM

## 2020-03-02 RX ORDER — FENTANYL CITRATE 50 UG/ML
INJECTION, SOLUTION INTRAMUSCULAR; INTRAVENOUS PRN
Status: DISCONTINUED | OUTPATIENT
Start: 2020-03-02 | End: 2020-03-02 | Stop reason: SDUPTHER

## 2020-03-02 RX ADMIN — PROPOFOL 70 MG: 10 INJECTION, EMULSION INTRAVENOUS at 13:00

## 2020-03-02 RX ADMIN — CEFAZOLIN 2 G: 10 INJECTION, POWDER, FOR SOLUTION INTRAVENOUS at 12:49

## 2020-03-02 RX ADMIN — FENTANYL CITRATE 50 MCG: 50 INJECTION, SOLUTION INTRAMUSCULAR; INTRAVENOUS at 12:56

## 2020-03-02 RX ADMIN — SODIUM CHLORIDE: 9 INJECTION, SOLUTION INTRAVENOUS at 11:32

## 2020-03-02 RX ADMIN — PROPOFOL 100 MG: 10 INJECTION, EMULSION INTRAVENOUS at 12:56

## 2020-03-02 RX ADMIN — PROPOFOL 100 MG: 10 INJECTION, EMULSION INTRAVENOUS at 12:51

## 2020-03-02 ASSESSMENT — PULMONARY FUNCTION TESTS
PIF_VALUE: 0
PIF_VALUE: 1
PIF_VALUE: 0
PIF_VALUE: 1
PIF_VALUE: 0
PIF_VALUE: 0

## 2020-03-02 ASSESSMENT — PAIN SCALES - GENERAL
PAINLEVEL_OUTOF10: 0

## 2020-03-02 NOTE — H&P
[facility]  Urology H&P Note     Patient:  Paulette Christine  MRN: [id number]  YOB: 1995    ATTENDING: Raul Ramos     CHIEF COMPLAINT:  Kidney stone    HISTORY OF PRESENT ILLNESS:   The patient is a 27 y.o. female who presents with kidney stone    Patient's old records, notes and chart reviewed and summarized above. Past Medical History:    Past Medical History:   Diagnosis Date    Kidney stone 2015    rt    PONV (postoperative nausea and vomiting)        Past Surgical History:    Past Surgical History:   Procedure Laterality Date    CYSTO/URETERO/PYELOSCOPY, CALCULUS TX Left 12/2/2019    CYSTOCOPY, LEFT STENT placement  performed by Peyton Betancourt MD at [facility] / [address] / STONE Left 1/27/2020    CYSTOSCOPY WITH LEFT URETERAL STENT EXCHANGE UNDER ULTRASOUND GUIDANCE performed by Peyton Betancourt MD at [address]  12/21/15    miscarriage    OTHER SURGICAL HISTORY  16 April 2015    Cystoscopy with right ureteroscopy basket stone removal stent placement (Dr. Liz Dunn, [facility])    TONSILLECTOMY      TYMPANOSTOMY TUBE PLACEMENT       Previous Urologic Surgery: none  Medications Prior to Admission:    Prior to Admission medications    Medication Sig Start Date End Date Taking? Authorizing Provider   oxyCODONE-acetaminophen (PERCOCET) 5-325 MG per tablet Take 1 tablet by mouth every 4 hours as needed for Pain. Yes Historical Provider, MD   acetaminophen (TYLENOL) 500 MG tablet Take 500 mg by mouth every 6 hours as needed for Pain   Yes Historical Provider, MD   ondansetron (ZOFRAN) 4 MG tablet Take 1 tablet by mouth every 8 hours as needed for Nausea 1/6/20   Sherry Hairston MD   folic acid (FOLVITE) 1 MG tablet Take 1 tablet by mouth daily 12/17/19   Reanna Moss MD       Allergies:  Patient has no known allergies.     Social History:    Social History

## 2020-03-02 NOTE — ANESTHESIA PRE PROCEDURE
Department of Anesthesiology  Preprocedure Note       Name:  Jerod Morales   Age:  27 y.o.  :  1995                                          MRN:  [id number]         Date:  3/2/2020      Surgeon: Melita Freeman):  Mirna Leslie MD    Procedure: CYSTOSCOPY WITH ULTRASOUND GUIDED LEFT URETERAL STENT EXCHANGE (N/A )    Medications prior to admission:   Prior to Admission medications    Medication Sig Start Date End Date Taking? Authorizing Provider   oxyCODONE-acetaminophen (PERCOCET) 5-325 MG per tablet Take 1 tablet by mouth every 4 hours as needed for Pain.    Yes Historical Provider, MD   acetaminophen (TYLENOL) 500 MG tablet Take 500 mg by mouth every 6 hours as needed for Pain   Yes Historical Provider, MD   ondansetron (ZOFRAN) 4 MG tablet Take 1 tablet by mouth every 8 hours as needed for Nausea 20   Jason Hamilton MD   folic acid (FOLVITE) 1 MG tablet Take 1 tablet by mouth daily 19   [place] Mariano MD       Current medications:    Current Facility-Administered Medications   Medication Dose Route Frequency Provider Last Rate Last Dose    scopolamine (TRANSDERM-SCOP) 1 MG/3DAYS transdermal patch             0.9 % sodium chloride infusion   Intravenous Continuous Do Long 100 mL/hr at 20 1132      ceFAZolin (ANCEF) 2 g in dextrose 5 % 50 mL IVPB  2 g Intravenous 30 Min Pre-Op Do Long           Allergies:  No Known Allergies    Problem List:    Patient Active Problem List   Diagnosis Code    Renal colic F51    Oligohydramnios O41.00X0    Pyelonephritis, acute N10    Urinary tract obstruction by kidney stone N20.0, N13.8    13 weeks gestation of pregnancy Z3A.13    Left lower quadrant abdominal pain R10.32    Hydronephrosis of left kidney N13.30    Left ureteral stone N20.1    Nausea and vomiting R11.2    Leukocytosis D72.829    Normocytic anemia D64.9    Hypokalemia Q97.8    Metabolic acidosis Z00.5    Marijuana abuse F12.10    Kidney stone N20.0 Past Medical History:        Diagnosis Date    Kidney stone     rt    PONV (postoperative nausea and vomiting)        Past Surgical History:        Procedure Laterality Date    CYSTO/URETERO/PYELOSCOPY, CALCULUS TX Left 2019    CYSTOCOPY, LEFT STENT placement  performed by Chang Obrien MD at [address] [place] [address] / [address] / STONE Left 2020    CYSTOSCOPY WITH LEFT URETERAL STENT EXCHANGE UNDER ULTRASOUND GUIDANCE performed by Chang Obrien MD at [address]  12/21/15    miscarriage    OTHER SURGICAL HISTORY  2015    Cystoscopy with right ureteroscopy basket stone removal stent placement (Dr. Esqueda Signs, [facility])    TONSILLECTOMY      TYMPANOSTOMY TUBE PLACEMENT         Social History:    Social History     Tobacco Use    Smoking status: Former Smoker     Types: Cigarettes     Last attempt to quit: 2019     Years since quittin.2    Smokeless tobacco: Never Used    Tobacco comment: Vape   Substance Use Topics    Alcohol use: No                                Counseling given: Not Answered  Comment: Vape      Vital Signs (Current):   Vitals:    20 1425   BP: 108/71   Pulse: 64   Resp: 16   Temp: 98.2 °F (36.8 °C)   TempSrc: Temporal   SpO2: 98%   Weight: 150 lb (68 kg)   Height: 5' 1\" (1.549 m)                                              BP Readings from Last 3 Encounters:   20 108/71   20 112/78   20 111/70       NPO Status: Time of last liquid consumption:                         Time of last solid consumption:                         Date of last liquid consumption: 20                        Date of last solid food consumption: 20    BMI:   Wt Readings from Last 3 Encounters:   20 150 lb (68 kg)   20 151 lb (68.5 kg)   20 154 lb (69.9 kg)     Body mass index is 28.34 kg/m².     CBC:   Lab Results   Component Value Date    WBC 15.1 2020    RBC 4.57 01/05/2020    HGB 14.3 01/05/2020    HCT 42.8 01/05/2020    MCV 93.7 01/05/2020    RDW 13.1 05/19/2018     01/05/2020       CMP:   Lab Results   Component Value Date     01/05/2020    K 4.6 01/05/2020    K 3.4 12/02/2019     01/05/2020    CO2 20 01/05/2020    BUN 9 01/05/2020    CREATININE 0.6 01/05/2020    LABGLOM >90 01/05/2020    GLUCOSE 96 01/05/2020    PROT 7.6 01/05/2020    CALCIUM 10.0 01/05/2020    BILITOT 0.4 01/05/2020    ALKPHOS 41 01/05/2020    AST 12 01/05/2020    ALT 6 01/05/2020       POC Tests: No results for input(s): POCGLU, POCNA, POCK, POCCL, POCBUN, POCHEMO, POCHCT in the last 72 hours. Coags:   Lab Results   Component Value Date    INR 1.09 12/02/2019       HCG (If Applicable):   Lab Results   Component Value Date    PREGTESTUR NEGATIVE 08/21/2018    PREGSERUM NEGATIVE 04/29/2019        ABGs: No results found for: PHART, PO2ART, PNJ2AVD, VHE8TVG, BEART, A7HOQSNW     Type & Screen (If Applicable):  Lab Results   Component Value Date    [facility] POS 01/01/2020       Anesthesia Evaluation  Patient summary reviewed and Nursing notes reviewed   history of anesthetic complications: PONV. Airway: Mallampati: II        Dental:          Pulmonary: breath sounds clear to auscultation                             Cardiovascular:            Rhythm: regular  Rate: normal                    Neuro/Psych:               GI/Hepatic/Renal:             Endo/Other:                      ROS comment: 26 weeks pregnant    FHT's 140s Abdominal:       Abdomen: soft. Vascular:                                        Anesthesia Plan      MAC     ASA 2       Induction: intravenous. MIPS: Postoperative opioids intended and Prophylactic antiemetics administered. Anesthetic plan and risks discussed with patient. Plan discussed with CRNA.                   67 Haley Hernandez DO   3/2/2020

## 2020-03-02 NOTE — ANESTHESIA POSTPROCEDURE EVALUATION
Department of Anesthesiology  Postprocedure Note    Patient: Shavon Costa  MRN: [id number]  YOB: 1995  Date of evaluation: 3/2/2020  Time:  2:13 PM     Procedure Summary     Date:  03/02/20 Room / Location:  [address]    Anesthesia Start:  1243 Anesthesia Stop:  0371    Procedure:  CYSTOSCOPY WITH ULTRASOUND GUIDED LEFT URETERAL STENT EXCHANGE (N/A ) Diagnosis:  (LEFT KIDNEY STONE, PREGNANT)    Surgeon:  Juaquin Hudson MD Responsible Provider:  Jah Jarrett DO    Anesthesia Type:  MAC ASA Status:  2          Anesthesia Type: MAC    Abhi Phase I: Abhi Score: 10    Abhi Phase II: Abhi Score: 10    Last vitals: Reviewed and per EMR flowsheets.        Anesthesia Post Evaluation    Patient location during evaluation: bedside  Patient participation: complete - patient participated  Level of consciousness: awake  Airway patency: patent  Nausea & Vomiting: no nausea and no vomiting  Complications: no  Cardiovascular status: hemodynamically stable  Respiratory status: acceptable  Hydration status: stable

## 2020-03-02 NOTE — OP NOTE
FACILITY:  [address] [place] [address] [facility] [address] [place] [address]  1995  [id number]    DATE: 03/02/20  SURGEON:  Dr. Daljit Bowles MD  ASSISTANT: Dr. Femi REY MD  PREOPERATIVE DIAGNOSIS: Left ureteral obstruction   POSTOPERATIVE DIAGNOSIS: Same  PROCEDURES PERFORMED:  1. Cystoscopy. 2. Left sided stent exchange (with ultrasound)  DRAINS: Left 6x26 cm cm double-J ureteral stent  SPECIMEN: none  ANESTHESIA: MAC  ESTIMATED BLOOD LOSS: None.   COMPLICATIONS: None.      INDICATIONS FOR PROCEDURE:  Heather Agarwal is a 27 y.o. female presents for Left ureteral stent exchange. She is 28 weeks pregnant. After the risks, benefits, alternatives, of the procedure were discussed with the patient, informed consent was obtained. The patient elected to proceed.      DETAILS OF THE PROCEDURE:  The patient was brought back to the preoperative holding area to the operating suite, and was transferred to the operating table where the patient lay in supine position. General endotracheal anesthesia was induced, and patient was prepped and draped in standard surgical fashion after being placed in dorsolithotomy position. A proper timeout was performed, preoperative antibiotics were given. A rigid cystoscope with a 22 Palauan sheath with 30 degree lens was inserted in the patient's urethral and into the bladder with ease. We then focused our attention on the Left ureteral orifice, we grasped the stent and pulled to the meatus, which we cannulated with our glidewire. Over our glidewire we placed a 6x26 cm double-J ureteral stent and we noted appropriate placement in the upper collecting system using fluoroscopy. We then removed our wire. There was good proximal and distal curl. We then drained the patient's bladder and removed the scope and the procedure was subsequently terminated.      Plan:   The patient will be discharged home in good condition.    Ob for monitoring  Follow up 5 weeks for stent exchange

## 2020-03-02 NOTE — TELEPHONE ENCOUNTER
SURGERY [address]  [address] [place] [address] [address] [employer] [address]      Phone *[phone]   Surgical Scheduling Direct Line Phone *[phone] Fax *[phone]      Matthew Miller 1995 female    [address] [facility]  [address] [address]   Marital Status: Single         Home Phone: [phone]      Cell Phone:    Telephone Information:   Mobile [phone]          Surgeon: Dr. Ana Ovalles  Surgery Date: 04-   Time: Belkys Martinez    Procedure: Cystoscopy, with ultrasound guided left ureteral stent exchange    Diagnosis: Left Kidney stone/ pregnant     Important Medical History: In [facility] in Ultrasound notified    Special Inst/Equip:  / OB for fetal heart monitoring    CPT Codes:    85822, [id number]  Latex Allergy:  No     Cardiac Device:  No     Anesthesia:  MAC          Admission Type:  Same Day                             Admit Prior to Day of Surgery: No    Case Location:  Main OR           Preadmission Testing:Phone Call              PAT Date and Time:______________________________________________________    PAT Confirmation #: ______________________________________________________    Post Op Visit: ___________________________________________________________    Need Preop Cardiac Clearance: No    Does Patient have Cardiologist/physician?      None    Surgery Confirmation #: __________________________________________________    : ________________________   Date: __________________________     [employer] Name: [facility]

## 2020-03-02 NOTE — TELEPHONE ENCOUNTER
DO NOT TAKE ASPIRIN, MULTIVITAMINS, OR MOTRIN-LIKE DRUGS 7 DAYS PRIOR TO SURGERY AND 3 DAYS FOLLOWING     Shavon Costa 1995 Diagnosis: Left Kidney stone    Surgical Physician: Dr. Sruthi Alcala have been scheduled for the procedure marked below:      Surgery: Cystoscopy with ultrasound guided left ureteral stent exchange           Date: 04-     Anesthesia: Anesthesiologist (General/Spinal)     Place of Service: [address] [place] [address] Second Floor Same Day Surgery           YOU WILL BE CONTACTED 1 TO 2-DAYS PRIOR TO YOUR SURGERY WITH YOUR SURGERY ARRIVAL TIME        INSTRUCTIONS AS MARKED BELOW:    1. DO NOT eat or drink anything after midnight before surgery. 2. We prefer you shower or bathe with an antibacterial soap (Dial) the morning of surgery. 3.  Please ensure to have a  with you to transport you home. 4.  Please bring a current medication list, photo ID and insurance card(s) with you  5. Okay to take Tylenol  6. If you take Glucophage or Metformin, hold 48-hours prior to surgery  7. Take blood pressure or heart medication as directed, if taken in the morning take with a small sip of water  8. PLEASE BRING THIS LETTER WITH YOU AND SHOW IT TO THE  AT [facility]. 9.  Please send a copy to the Family Dr: Cheyenne Mckoy MD  10. You should receive a follow up appointment upon discharge from the hospital.  If you do not the office will call in 1-2 days to schedule.     Date: 3/2/2020

## 2020-03-02 NOTE — PROGRESS NOTES
Patient admitted to [facility] room 8 with family at bedside. Bed in low position side rails up call light in reach. Patient denies questions at this time.

## 2020-03-03 ENCOUNTER — TELEPHONE (OUTPATIENT)
Dept: UROLOGY | Age: 25
End: 2020-03-03

## 2020-03-05 ENCOUNTER — TELEPHONE (OUTPATIENT)
Dept: UROLOGY | Age: 25
End: 2020-03-05

## 2020-03-05 ENCOUNTER — HOSPITAL ENCOUNTER (EMERGENCY)
Age: 25
Discharge: HOME | End: 2020-03-05
Attending: FAMILY MEDICINE
Payer: COMMERCIAL

## 2020-03-05 ENCOUNTER — TELEPHONE (OUTPATIENT)
Dept: FAMILY MEDICINE CLINIC | Age: 25
End: 2020-03-05

## 2020-03-05 ENCOUNTER — APPOINTMENT (OUTPATIENT)
Dept: ULTRASOUND IMAGING | Age: 25
End: 2020-03-05
Payer: COMMERCIAL

## 2020-03-05 VITALS
WEIGHT: 154 LBS | OXYGEN SATURATION: 96 % | SYSTOLIC BLOOD PRESSURE: 104 MMHG | BODY MASS INDEX: 29.07 KG/M2 | HEIGHT: 61 IN | DIASTOLIC BLOOD PRESSURE: 69 MMHG | TEMPERATURE: 98 F | RESPIRATION RATE: 18 BRPM | HEART RATE: 83 BPM

## 2020-03-05 LAB
ANION GAP SERPL CALCULATED.3IONS-SCNC: 12 MEQ/L (ref 8–16)
BACTERIA: ABNORMAL
BASOPHILS # BLD: 0.3 %
BASOPHILS ABSOLUTE: 0 THOU/MM3 (ref 0–0.1)
BILIRUBIN URINE: NEGATIVE
BLOOD, URINE: ABNORMAL
BUN BLDV-MCNC: 6 MG/DL (ref 7–22)
CALCIUM SERPL-MCNC: 8.8 MG/DL (ref 8.5–10.5)
CASTS: ABNORMAL /LPF
CASTS: ABNORMAL /LPF
CHARACTER, URINE: ABNORMAL
CHLORIDE BLD-SCNC: 103 MEQ/L (ref 98–111)
CO2: 21 MEQ/L (ref 23–33)
COLOR: YELLOW
CREAT SERPL-MCNC: 0.5 MG/DL (ref 0.4–1.2)
CRYSTALS: ABNORMAL
EOSINOPHIL # BLD: 0.8 %
EOSINOPHILS ABSOLUTE: 0.1 THOU/MM3 (ref 0–0.4)
EPITHELIAL CELLS, UA: ABNORMAL /HPF
ERYTHROCYTE [DISTWIDTH] IN BLOOD BY AUTOMATED COUNT: 13.1 % (ref 11.5–14.5)
ERYTHROCYTE [DISTWIDTH] IN BLOOD BY AUTOMATED COUNT: 45 FL (ref 35–45)
GFR SERPL CREATININE-BSD FRML MDRD: > 90 ML/MIN/1.73M2
GLUCOSE BLD-MCNC: 91 MG/DL (ref 70–108)
GLUCOSE, URINE: NEGATIVE MG/DL
HCT VFR BLD CALC: 35.6 % (ref 37–47)
HEMOGLOBIN: 11.9 GM/DL (ref 12–16)
IMMATURE GRANS (ABS): 0.14 THOU/MM3 (ref 0–0.07)
IMMATURE GRANULOCYTES: 1.2 %
KETONES, URINE: ABNORMAL
LEUKOCYTE ESTERASE, URINE: ABNORMAL
LYMPHOCYTES # BLD: 15.7 %
LYMPHOCYTES ABSOLUTE: 1.9 THOU/MM3 (ref 1–4.8)
MCH RBC QN AUTO: 31.6 PG (ref 26–33)
MCHC RBC AUTO-ENTMCNC: 33.4 GM/DL (ref 32.2–35.5)
MCV RBC AUTO: 94.4 FL (ref 81–99)
MISCELLANEOUS LAB TEST RESULT: ABNORMAL
MONOCYTES # BLD: 5.1 %
MONOCYTES ABSOLUTE: 0.6 THOU/MM3 (ref 0.4–1.3)
NITRITE, URINE: NEGATIVE
NUCLEATED RED BLOOD CELLS: 0 /100 WBC
OSMOLALITY CALCULATION: 269.2 MOSMOL/KG (ref 275–300)
PH UA: 6 (ref 5–9)
PLATELET # BLD: 236 THOU/MM3 (ref 130–400)
PMV BLD AUTO: 10.3 FL (ref 9.4–12.4)
POTASSIUM SERPL-SCNC: 3.5 MEQ/L (ref 3.5–5.2)
PROTEIN UA: 100 MG/DL
RBC # BLD: 3.77 MILL/MM3 (ref 4.2–5.4)
RBC URINE: ABNORMAL /HPF
RENAL EPITHELIAL, UA: ABNORMAL
SEG NEUTROPHILS: 76.9 %
SEGMENTED NEUTROPHILS ABSOLUTE COUNT: 9.4 THOU/MM3 (ref 1.8–7.7)
SODIUM BLD-SCNC: 136 MEQ/L (ref 135–145)
SPECIFIC GRAVITY UA: 1.02 (ref 1–1.03)
UROBILINOGEN, URINE: 0.2 EU/DL (ref 0–1)
WBC # BLD: 12.2 THOU/MM3 (ref 4.8–10.8)
WBC UA: ABNORMAL /HPF
YEAST: ABNORMAL

## 2020-03-05 PROCEDURE — 96361 HYDRATE IV INFUSION ADD-ON: CPT

## 2020-03-05 PROCEDURE — 81001 URINALYSIS AUTO W/SCOPE: CPT

## 2020-03-05 PROCEDURE — 2580000003 HC RX 258: Performed by: FAMILY MEDICINE

## 2020-03-05 PROCEDURE — 6360000002 HC RX W HCPCS: Performed by: FAMILY MEDICINE

## 2020-03-05 PROCEDURE — 96374 THER/PROPH/DIAG INJ IV PUSH: CPT

## 2020-03-05 PROCEDURE — 6370000000 HC RX 637 (ALT 250 FOR IP): Performed by: FAMILY MEDICINE

## 2020-03-05 PROCEDURE — 99284 EMERGENCY DEPT VISIT MOD MDM: CPT

## 2020-03-05 PROCEDURE — 80048 BASIC METABOLIC PNL TOTAL CA: CPT

## 2020-03-05 PROCEDURE — 85025 COMPLETE CBC W/AUTO DIFF WBC: CPT

## 2020-03-05 PROCEDURE — 96360 HYDRATION IV INFUSION INIT: CPT

## 2020-03-05 PROCEDURE — 96375 TX/PRO/DX INJ NEW DRUG ADDON: CPT

## 2020-03-05 PROCEDURE — 87086 URINE CULTURE/COLONY COUNT: CPT

## 2020-03-05 PROCEDURE — 36415 COLL VENOUS BLD VENIPUNCTURE: CPT

## 2020-03-05 PROCEDURE — 76775 US EXAM ABDO BACK WALL LIM: CPT

## 2020-03-05 RX ORDER — ONDANSETRON 2 MG/ML
4 INJECTION INTRAMUSCULAR; INTRAVENOUS
Status: COMPLETED | OUTPATIENT
Start: 2020-03-05 | End: 2020-03-05

## 2020-03-05 RX ORDER — LIDOCAINE 50 MG/G
1 PATCH TOPICAL DAILY
Qty: 6 PATCH | Refills: 0 | Status: SHIPPED | OUTPATIENT
Start: 2020-03-05 | End: 2020-03-09

## 2020-03-05 RX ORDER — MORPHINE SULFATE 4 MG/ML
4 INJECTION, SOLUTION INTRAMUSCULAR; INTRAVENOUS ONCE
Status: COMPLETED | OUTPATIENT
Start: 2020-03-05 | End: 2020-03-05

## 2020-03-05 RX ORDER — OXYCODONE HYDROCHLORIDE AND ACETAMINOPHEN 5; 325 MG/1; MG/1
1 TABLET ORAL ONCE
Status: COMPLETED | OUTPATIENT
Start: 2020-03-05 | End: 2020-03-05

## 2020-03-05 RX ORDER — ONDANSETRON 4 MG/1
4 TABLET, FILM COATED ORAL EVERY 8 HOURS PRN
Qty: 20 TABLET | Refills: 0 | Status: SHIPPED | OUTPATIENT
Start: 2020-03-05 | End: 2020-03-09

## 2020-03-05 RX ORDER — SODIUM CHLORIDE 9 MG/ML
INJECTION, SOLUTION INTRAVENOUS CONTINUOUS
Status: DISCONTINUED | OUTPATIENT
Start: 2020-03-05 | End: 2020-03-05 | Stop reason: HOSPADM

## 2020-03-05 RX ORDER — OXYCODONE HYDROCHLORIDE AND ACETAMINOPHEN 5; 325 MG/1; MG/1
1 TABLET ORAL EVERY 6 HOURS PRN
Qty: 12 TABLET | Refills: 0 | Status: SHIPPED | OUTPATIENT
Start: 2020-03-05 | End: 2020-03-08

## 2020-03-05 RX ORDER — CEFDINIR 300 MG/1
300 CAPSULE ORAL 2 TIMES DAILY
Qty: 14 CAPSULE | Refills: 0 | Status: SHIPPED | OUTPATIENT
Start: 2020-03-05 | End: 2020-03-09

## 2020-03-05 RX ADMIN — ONDANSETRON 4 MG: 2 INJECTION INTRAMUSCULAR; INTRAVENOUS at 15:45

## 2020-03-05 RX ADMIN — MORPHINE SULFATE 4 MG: 4 INJECTION, SOLUTION INTRAMUSCULAR; INTRAVENOUS at 15:45

## 2020-03-05 RX ADMIN — OXYCODONE HYDROCHLORIDE AND ACETAMINOPHEN 1 TABLET: 5; 325 TABLET ORAL at 17:43

## 2020-03-05 RX ADMIN — SODIUM CHLORIDE: 9 INJECTION, SOLUTION INTRAVENOUS at 15:41

## 2020-03-05 ASSESSMENT — PAIN DESCRIPTION - DESCRIPTORS: DESCRIPTORS: STABBING;SHARP

## 2020-03-05 ASSESSMENT — PAIN SCALES - GENERAL
PAINLEVEL_OUTOF10: 8
PAINLEVEL_OUTOF10: 3

## 2020-03-05 ASSESSMENT — PAIN DESCRIPTION - ORIENTATION: ORIENTATION: LEFT

## 2020-03-05 ASSESSMENT — PAIN DESCRIPTION - PAIN TYPE: TYPE: ACUTE PAIN

## 2020-03-05 ASSESSMENT — PAIN DESCRIPTION - LOCATION: LOCATION: FLANK

## 2020-03-05 ASSESSMENT — ENCOUNTER SYMPTOMS
VOMITING: 0
NAUSEA: 1
SHORTNESS OF BREATH: 0

## 2020-03-05 NOTE — ED PROVIDER NOTES
movements intact. Pupils: Pupils are equal, round, and reactive to light. Neck:      Musculoskeletal: Normal range of motion. No neck rigidity. Cardiovascular:      Rate and Rhythm: Normal rate and regular rhythm. Heart sounds: Normal heart sounds. Pulmonary:      Effort: Pulmonary effort is normal.      Breath sounds: Normal breath sounds. Abdominal:      Palpations: Abdomen is soft. Tenderness: There is no abdominal tenderness. There is no guarding or rebound. Comments: Gravid uterus nontender midway between the umbilicus and xiphoid   Musculoskeletal:         General: Tenderness present. No swelling. Comments: Tenderness toward left CVA   Skin:     General: Skin is warm and dry. Neurological:      General: No focal deficit present. Mental Status: She is alert. Psychiatric:         Mood and Affect: Mood normal.         Behavior: Behavior normal.         DIFFERENTIAL DIAGNOSIS:     Left renal colic. Known kidney stone with stent left        DIAGNOSTIC RESULTS       RADIOLOGY: non-plain film images(s) such as CT, Ultrasound and MRI are read by the radiologist.    US RENAL LIMITED   Final Result   Left hydronephrosis and possible staghorn calculus. Small punctate calcifications in the bladder. No stent is identified. **This report has been created using voice recognition software. It may contain minor errors which are inherent in voice recognition technology. **      Final report electronically signed by Dr. Hemalatha Vivar on 3/5/2020 4:45 PM           LABS:   Labs Reviewed   CBC WITH AUTO DIFFERENTIAL - Abnormal; Notable for the following components:       Result Value    WBC 12.2 (*)     RBC 3.77 (*)     Hemoglobin 11.9 (*)     Hematocrit 35.6 (*)     Segs Absolute 9.4 (*)     Immature Grans (Abs) 0.14 (*)     All other components within normal limits   BASIC METABOLIC PANEL - Abnormal; Notable for the following components:    CO2 21 (*)     BUN 6 (*)     All other components within normal limits   URINALYSIS WITH MICROSCOPIC - Abnormal; Notable for the following components:    Ketones, Urine TRACE (*)     Blood, Urine LARGE (*)     Protein,  (*)     Leukocyte Esterase, Urine MODERATE (*)     Character, Urine CLOUDY (*)     All other components within normal limits   OSMOLALITY - Abnormal; Notable for the following components:    Osmolality Calc 269.2 (*)     All other components within normal limits   CULTURE, URINE   ANION GAP   GLOMERULAR FILTRATION RATE, ESTIMATED       EMERGENCY DEPARTMENT COURSE:   Vitals:    Vitals:    03/05/20 1531 03/05/20 1705   BP: 107/70 110/66   Pulse: 80 68   Resp: 18 18   Temp: 98 °F (36.7 °C)    TempSrc: Oral    SpO2: 97% 98%   Weight: 154 lb (69.9 kg)    Height: 5' 1\" (1.549 m)        3:31 PM: The patient was seen and evaluated. Nursing notes reviewed     I did talk to Dr. Haris Milian indicated she was comfortable with narcotics as needed for pain    I did talk with urology who indicated they would like ultrasound to look for hydronephrosis    Ultrasound did show hydronephrosis on the left which was not graded. Previous ultrasounds all showed some degree of hydronephrosis. Urinalysis showed 50-75 WBC/hpf with no bacteria    Urine culture pending    WBC 12.2    She was medicated with morphine with improvement in pain    I did talk to urology a second time with the also the ultrasound and they felt the hydronephrosis was about as expected and will continue to follow as an outpatient with symptomatic treatment    I did inform Dr. Haris Milian of the findings and she also suggested trying lidocaine patch    She feels well enough to go home    We will prescribe a limited number of Percocet, Zofran for nausea, and Lidoderm patch        CRITICAL CARE:   none     CONSULTS:  Dr. Natalia Moore. Bernarda CNP    PROCEDURES:  none     FINAL IMPRESSION      1.  Renal colic          DISPOSITION/PLAN     Patient encouraged to return if she develops

## 2020-03-05 NOTE — ED NOTES
Pt resting in bed. States that her pain is a lot better but is starting to come back. VSS. Urine sent to lab. Call light in reach. Will monitor.       Melanie Rangel RN  03/05/20 2246

## 2020-03-05 NOTE — TELEPHONE ENCOUNTER
Talked with Leilani Cristobal (manager) as I didn't know what type of note she would need. She said to have her f/u with PCP regarding this.     Electronically signed by LEV Singh CNP on 3/5/2020 at 11:20 AM

## 2020-03-05 NOTE — TELEPHONE ENCOUNTER
Patient underwent Cystoscopy. Left sided stent exchange on 03/02/2020 with Dr Leon Hooker. She c/o left back pain that radiates to her side that is continuous rated 9 on scale of 0-10. She now has hot and cold chills. She is using tylenol for pain with out any pain relief. She also is using hot showers and heating pad. She c/o nausea. Zofran is helping for the nasuea. She denies fever, burning, urgency, frequency, and vomiting. She is 27 weeks pregnant. She is scheduled on 04/06/2020  Cystoscopy, with ultrasound guided left ureteral stent exchange. She is out of the percocet. Please advise. Thank you.

## 2020-03-05 NOTE — TELEPHONE ENCOUNTER
Attempted to call the patient regarding the message below. Voicemail left to have the PCP provide her with the note for .

## 2020-03-05 NOTE — TELEPHONE ENCOUNTER
Please have pt check urine with reflex culture and start Omnicef empirically for possible infection. Check renal ultrasound, cbc, bmp now. If she has any fever/chills she needs to proceed to ER for more urgent workup as she is 27 weeks pregnant. Unfortunately I am unable to prescribe narcotics as I have never seen this patient so narcotics will need to be facilitated by Dr. Jane Frazier or OB or she will need to be scheduled for appt in the office with another provider to eval symptoms.

## 2020-03-05 NOTE — TELEPHONE ENCOUNTER
Attempted to call the patient regarding the message below. Voicemail left to return the call to the office. Julia,   It looks like the patient is in the ER. Do you still want the testing or see what treatment is given in the ER first?  Please advise.  Thank you

## 2020-03-06 NOTE — TELEPHONE ENCOUNTER
Attempted to call the patient for an update on how she is feeling. Voicemail left to return the call to the office. Is there any other recommendations for this patient? She had testing completed in the ER. Please advise. Thank you.

## 2020-03-07 LAB — URINE CULTURE, ROUTINE: NORMAL

## 2020-03-09 ENCOUNTER — PREP FOR PROCEDURE (OUTPATIENT)
Dept: UROLOGY | Age: 25
End: 2020-03-09

## 2020-03-09 ENCOUNTER — OFFICE VISIT (OUTPATIENT)
Dept: UROLOGY | Age: 25
End: 2020-03-09
Payer: COMMERCIAL

## 2020-03-09 VITALS
SYSTOLIC BLOOD PRESSURE: 102 MMHG | WEIGHT: 154.2 LBS | HEIGHT: 61 IN | DIASTOLIC BLOOD PRESSURE: 60 MMHG | BODY MASS INDEX: 29.11 KG/M2

## 2020-03-09 LAB
BILIRUBIN URINE: NEGATIVE
BLOOD URINE, POC: ABNORMAL
CHARACTER, URINE: CLEAR
COLOR, URINE: YELLOW
GLUCOSE URINE: NEGATIVE MG/DL
KETONES, URINE: NEGATIVE
LEUKOCYTE CLUMPS, URINE: ABNORMAL
NITRITE, URINE: NEGATIVE
PH, URINE: 7 (ref 5–9)
PROTEIN, URINE: 100 MG/DL
SPECIFIC GRAVITY, URINE: >= 1.03 (ref 1–1.03)
UROBILINOGEN, URINE: 0.2 EU/DL (ref 0–1)

## 2020-03-09 PROCEDURE — 81003 URINALYSIS AUTO W/O SCOPE: CPT | Performed by: UROLOGY

## 2020-03-09 PROCEDURE — G8484 FLU IMMUNIZE NO ADMIN: HCPCS | Performed by: UROLOGY

## 2020-03-09 PROCEDURE — 99213 OFFICE O/P EST LOW 20 MIN: CPT | Performed by: UROLOGY

## 2020-03-09 PROCEDURE — G8427 DOCREV CUR MEDS BY ELIG CLIN: HCPCS | Performed by: UROLOGY

## 2020-03-09 PROCEDURE — 1036F TOBACCO NON-USER: CPT | Performed by: UROLOGY

## 2020-03-09 PROCEDURE — G8419 CALC BMI OUT NRM PARAM NOF/U: HCPCS | Performed by: UROLOGY

## 2020-03-09 RX ORDER — SODIUM CHLORIDE 9 MG/ML
INJECTION, SOLUTION INTRAVENOUS CONTINUOUS
Status: CANCELLED | OUTPATIENT
Start: 2020-04-06

## 2020-03-09 NOTE — PROGRESS NOTES
Dr. Sylvia Soriano MD  [facility]rology [facility] Progress Note      Patient:  Jerod Morales  YOB: 1995  Date: 3/9/2020    HISTORY OF PRESENT ILLNESS:   The patient is a 27 y.o. female who presents today for follow-up for the following problem(s): kidney stone in pregnancy  Overall the problem(s) : are worsening. Associated Symptoms: No dysuria, gross hematuria. Pain Severity:      Today visit:   3/9/20   Pt doing well  Tolerating stent  Discussed need for 5 week stent exchange for risk of encrustation  Discussed stone treatment, and offered urs HLL.   She would like to treat after delivery  - pain under control  - no UTIs    Summary of old records:     Imaging Reviewed during this Office Visit:   (results were independently reviewed by physician and radiology report verified)    Urinalysis today:  Results for POC orders placed in visit on 03/09/20   POCT Urinalysis No Micro (Auto)   Result Value Ref Range    Glucose, Ur Negative NEGATIVE mg/dl    Bilirubin Urine Negative     Ketones, Urine Negative NEGATIVE    Specific Gravity, Urine >= 1.030 1.002 - 1.03    Blood, UA POC Moderate (A) NEGATIVE    pH, Urine 7.00 5.0 - 9.0    Protein, Urine 100 (A) NEGATIVE mg/dl    Urobilinogen, Urine 0.20 0.0 - 1.0 eu/dl    Nitrite, Urine Negative NEGATIVE    Leukocyte Clumps, Urine Small (A) NEGATIVE    Color, Urine Yellow YELLOW-STR    Character, Urine Clear CLR-SL.RENA       Last BUN and creatinine:  Lab Results   Component Value Date    BUN 6 (L) 03/05/2020     Lab Results   Component Value Date    CREATININE 0.5 03/05/2020       PAST MEDICAL, FAMILY AND SOCIAL HISTORY UPDATE:  Past Medical History:   Diagnosis Date    Kidney stone 2015    rt    PONV (postoperative nausea and vomiting)      Past Surgical History:   Procedure Laterality Date    CYSTO/URETERO/PYELOSCOPY, CALCULUS TX Left 12/2/2019    CYSTOCOPY, LEFT STENT placement  performed by Mirna Leslie MD [facility] [address] / REMOVAL STENT / STONE Left 2020    CYSTOSCOPY WITH LEFT URETERAL STENT EXCHANGE UNDER ULTRASOUND GUIDANCE performed by [facility] [address] / STONE N/A 3/2/2020    CYSTOSCOPY WITH ULTRASOUND GUIDED LEFT URETERAL STENT EXCHANGE performed by Arlene Mayfield MD at [address]  12/21/15    miscarriage    OTHER SURGICAL HISTORY  2015    Cystoscopy with right ureteroscopy basket stone removal stent placement (Dr. Dianna Duarte, [facility])    TONSILLECTOMY      TYMPANOSTOMY TUBE PLACEMENT       Family History   Problem Relation Age of Onset    Heart Disease Father     Heart Disease Paternal Grandfather      Outpatient Medications Marked as Taking for the 3/9/20 encounter (Office Visit) with Arlene Mayfield MD   Medication Sig Dispense Refill    Pediatric Multivit-Minerals-C (FLINTSTONES GUMMIES PLUS PO) Take by mouth      oxyCODONE-acetaminophen (PERCOCET) 5-325 MG per tablet Take 1 tablet by mouth every 4 hours as needed for Pain. Patient has no known allergies. Social History     Tobacco Use   Smoking Status Former Smoker    Types: Cigarettes    Last attempt to quit: 2019    Years since quittin.2   Smokeless Tobacco Never Used   Tobacco Comment    Vape       Social History     Substance and Sexual Activity   Alcohol Use No       REVIEW OF SYSTEMS:  Constitutional: negative  Eyes: negative  Respiratory: negative  Cardiovascular: negative  Gastrointestinal: negative  Genitourinary: negative  Musculoskeletal: negative  Skin: negative   Neurological: negative  Hematological/Lymphatic: negative  Psychological: negative    Physical Exam:      Vitals:    20 1046   BP: 102/60     Constitutional: Patient in no acute distress   Neuro: alert and oriented to person place and time.     Psych: Mood and affect normal.  Head: atraumatic normocephalic  Eyes: EOMi  HEENT: neck supple, trachea midline  Lungs: Respiratory effort normal  Cardiovascular:  Normal peripheral pulses  Abdomen: Soft, non-tender, non-distended, No CVA  Bladder: non-tender and not distended. FROMx4, no cyanosis clubbing edema  Skin: warm and dry      Assessment and Plan      1. Kidney stone           Plan:      No follow-ups on file.   Continue with 5 week stent exchanges  Will plan for URS HLL after delivery

## 2020-03-13 ENCOUNTER — TELEPHONE (OUTPATIENT)
Dept: FAMILY MEDICINE CLINIC | Age: 25
End: 2020-03-13

## 2020-03-20 ENCOUNTER — HOSPITAL ENCOUNTER (INPATIENT)
Age: 25
LOS: 4 days | Discharge: HOME HEALTH | DRG: 818 | End: 2020-03-24
Attending: OPHTHALMOLOGY | Admitting: OPHTHALMOLOGY
Payer: COMMERCIAL

## 2020-03-20 ENCOUNTER — APPOINTMENT (OUTPATIENT)
Dept: ULTRASOUND IMAGING | Age: 25
DRG: 818 | End: 2020-03-20
Payer: COMMERCIAL

## 2020-03-20 PROBLEM — N20.0 NEPHROLITHIASIS: Status: ACTIVE | Noted: 2020-03-20

## 2020-03-20 LAB
ALBUMIN SERPL-MCNC: 3.3 G/DL (ref 3.5–5.1)
ALP BLD-CCNC: 48 U/L (ref 38–126)
ALT SERPL-CCNC: 6 U/L (ref 11–66)
AMORPHOUS: ABNORMAL
AMPHETAMINE+METHAMPHETAMINE URINE SCREEN: NEGATIVE
ANION GAP SERPL CALCULATED.3IONS-SCNC: 14 MEQ/L (ref 8–16)
AST SERPL-CCNC: 13 U/L (ref 5–40)
BACTERIA: ABNORMAL /HPF
BARBITURATE QUANTITATIVE URINE: NEGATIVE
BASOPHILS # BLD: 0.3 %
BASOPHILS ABSOLUTE: 0 THOU/MM3 (ref 0–0.1)
BENZODIAZEPINE QUANTITATIVE URINE: NEGATIVE
BILIRUB SERPL-MCNC: < 0.2 MG/DL (ref 0.3–1.2)
BILIRUBIN DIRECT: < 0.2 MG/DL (ref 0–0.3)
BILIRUBIN URINE: NEGATIVE
BLOOD, URINE: ABNORMAL
BUN BLDV-MCNC: 8 MG/DL (ref 7–22)
CALCIUM SERPL-MCNC: 8.4 MG/DL (ref 8.5–10.5)
CANNABINOID QUANTITATIVE URINE: POSITIVE
CASTS 2: ABNORMAL /LPF
CASTS UA: ABNORMAL /LPF
CHARACTER, URINE: ABNORMAL
CHLORIDE BLD-SCNC: 107 MEQ/L (ref 98–111)
CO2: 19 MEQ/L (ref 23–33)
COCAINE METABOLITE QUANTITATIVE URINE: NEGATIVE
COLOR: YELLOW
CREAT SERPL-MCNC: 0.5 MG/DL (ref 0.4–1.2)
CRYSTALS, UA: ABNORMAL
EOSINOPHIL # BLD: 1.2 %
EOSINOPHILS ABSOLUTE: 0.1 THOU/MM3 (ref 0–0.4)
EPITHELIAL CELLS, UA: ABNORMAL /HPF
ERYTHROCYTE [DISTWIDTH] IN BLOOD BY AUTOMATED COUNT: 12.6 % (ref 11.5–14.5)
ERYTHROCYTE [DISTWIDTH] IN BLOOD BY AUTOMATED COUNT: 43.7 FL (ref 35–45)
GFR SERPL CREATININE-BSD FRML MDRD: > 90 ML/MIN/1.73M2
GLUCOSE BLD-MCNC: 86 MG/DL (ref 70–108)
GLUCOSE URINE: NEGATIVE MG/DL
HCT VFR BLD CALC: 34.6 % (ref 37–47)
HEMOGLOBIN: 11.4 GM/DL (ref 12–16)
IMMATURE GRANS (ABS): 0.11 THOU/MM3 (ref 0–0.07)
IMMATURE GRANULOCYTES: 1.1 %
KETONES, URINE: NEGATIVE
LEUKOCYTE ESTERASE, URINE: ABNORMAL
LIPASE: 22.7 U/L (ref 5.6–51.3)
LYMPHOCYTES # BLD: 18.8 %
LYMPHOCYTES ABSOLUTE: 1.9 THOU/MM3 (ref 1–4.8)
MAGNESIUM: 1.9 MG/DL (ref 1.6–2.4)
MCH RBC QN AUTO: 31.3 PG (ref 26–33)
MCHC RBC AUTO-ENTMCNC: 32.9 GM/DL (ref 32.2–35.5)
MCV RBC AUTO: 95.1 FL (ref 81–99)
MISCELLANEOUS 2: ABNORMAL
MONOCYTES # BLD: 6.6 %
MONOCYTES ABSOLUTE: 0.7 THOU/MM3 (ref 0.4–1.3)
NITRITE, URINE: NEGATIVE
NUCLEATED RED BLOOD CELLS: 0 /100 WBC
OPIATES, URINE: POSITIVE
OSMOLALITY CALCULATION: 277 MOSMOL/KG (ref 275–300)
OXYCODONE: POSITIVE
PH UA: 6.5 (ref 5–9)
PHENCYCLIDINE QUANTITATIVE URINE: NEGATIVE
PLATELET # BLD: 190 THOU/MM3 (ref 130–400)
PMV BLD AUTO: 10.8 FL (ref 9.4–12.4)
POTASSIUM SERPL-SCNC: 4.1 MEQ/L (ref 3.5–5.2)
PROTEIN UA: 100
RBC # BLD: 3.64 MILL/MM3 (ref 4.2–5.4)
RBC URINE: ABNORMAL /HPF
RENAL EPITHELIAL, UA: ABNORMAL
SEG NEUTROPHILS: 72 %
SEGMENTED NEUTROPHILS ABSOLUTE COUNT: 7.4 THOU/MM3 (ref 1.8–7.7)
SODIUM BLD-SCNC: 140 MEQ/L (ref 135–145)
SPECIFIC GRAVITY, URINE: 1.02 (ref 1–1.03)
TOTAL PROTEIN: 6.3 G/DL (ref 6.1–8)
UROBILINOGEN, URINE: 1 EU/DL (ref 0–1)
WBC # BLD: 10.3 THOU/MM3 (ref 4.8–10.8)
WBC UA: ABNORMAL /HPF
YEAST: ABNORMAL

## 2020-03-20 PROCEDURE — 99285 EMERGENCY DEPT VISIT HI MDM: CPT

## 2020-03-20 PROCEDURE — 2580000003 HC RX 258: Performed by: PHYSICIAN ASSISTANT

## 2020-03-20 PROCEDURE — 76770 US EXAM ABDO BACK WALL COMP: CPT

## 2020-03-20 PROCEDURE — 6360000002 HC RX W HCPCS: Performed by: PHYSICIAN ASSISTANT

## 2020-03-20 PROCEDURE — 87077 CULTURE AEROBIC IDENTIFY: CPT

## 2020-03-20 PROCEDURE — 94760 N-INVAS EAR/PLS OXIMETRY 1: CPT

## 2020-03-20 PROCEDURE — 2580000003 HC RX 258

## 2020-03-20 PROCEDURE — 85025 COMPLETE CBC W/AUTO DIFF WBC: CPT

## 2020-03-20 PROCEDURE — 81001 URINALYSIS AUTO W/SCOPE: CPT

## 2020-03-20 PROCEDURE — 1200000000 HC SEMI PRIVATE

## 2020-03-20 PROCEDURE — 83735 ASSAY OF MAGNESIUM: CPT

## 2020-03-20 PROCEDURE — 80053 COMPREHEN METABOLIC PANEL: CPT

## 2020-03-20 PROCEDURE — 96376 TX/PRO/DX INJ SAME DRUG ADON: CPT

## 2020-03-20 PROCEDURE — 99222 1ST HOSP IP/OBS MODERATE 55: CPT | Performed by: PHYSICIAN ASSISTANT

## 2020-03-20 PROCEDURE — 87186 SC STD MICRODIL/AGAR DIL: CPT

## 2020-03-20 PROCEDURE — 6370000000 HC RX 637 (ALT 250 FOR IP): Performed by: PHYSICIAN ASSISTANT

## 2020-03-20 PROCEDURE — 87086 URINE CULTURE/COLONY COUNT: CPT

## 2020-03-20 PROCEDURE — 82248 BILIRUBIN DIRECT: CPT

## 2020-03-20 PROCEDURE — 96374 THER/PROPH/DIAG INJ IV PUSH: CPT

## 2020-03-20 PROCEDURE — 2709999900 HC NON-CHARGEABLE SUPPLY

## 2020-03-20 PROCEDURE — 96375 TX/PRO/DX INJ NEW DRUG ADDON: CPT

## 2020-03-20 PROCEDURE — 80307 DRUG TEST PRSMV CHEM ANLYZR: CPT

## 2020-03-20 PROCEDURE — 36415 COLL VENOUS BLD VENIPUNCTURE: CPT

## 2020-03-20 PROCEDURE — 99253 IP/OBS CNSLTJ NEW/EST LOW 45: CPT | Performed by: NURSE PRACTITIONER

## 2020-03-20 PROCEDURE — 83690 ASSAY OF LIPASE: CPT

## 2020-03-20 RX ORDER — SODIUM CHLORIDE 9 MG/ML
INJECTION, SOLUTION INTRAVENOUS CONTINUOUS
Status: DISCONTINUED | OUTPATIENT
Start: 2020-03-20 | End: 2020-03-24

## 2020-03-20 RX ORDER — OXYCODONE HYDROCHLORIDE AND ACETAMINOPHEN 5; 325 MG/1; MG/1
1 TABLET ORAL EVERY 6 HOURS PRN
Status: ON HOLD | COMMUNITY
End: 2020-03-24 | Stop reason: HOSPADM

## 2020-03-20 RX ORDER — SODIUM CHLORIDE 9 MG/ML
INJECTION, SOLUTION INTRAVENOUS CONTINUOUS
Status: DISCONTINUED | OUTPATIENT
Start: 2020-03-20 | End: 2020-03-22 | Stop reason: SDUPTHER

## 2020-03-20 RX ORDER — FENTANYL CITRATE 50 UG/ML
25 INJECTION, SOLUTION INTRAMUSCULAR; INTRAVENOUS ONCE
Status: DISCONTINUED | OUTPATIENT
Start: 2020-03-20 | End: 2020-03-24 | Stop reason: HOSPADM

## 2020-03-20 RX ORDER — ACETAMINOPHEN 325 MG/1
650 TABLET ORAL EVERY 6 HOURS PRN
Status: DISCONTINUED | OUTPATIENT
Start: 2020-03-20 | End: 2020-03-23

## 2020-03-20 RX ORDER — ONDANSETRON 2 MG/ML
4 INJECTION INTRAMUSCULAR; INTRAVENOUS EVERY 6 HOURS PRN
Status: DISCONTINUED | OUTPATIENT
Start: 2020-03-20 | End: 2020-03-24 | Stop reason: HOSPADM

## 2020-03-20 RX ORDER — DOCUSATE SODIUM 100 MG/1
100 CAPSULE, LIQUID FILLED ORAL 2 TIMES DAILY
Status: DISCONTINUED | OUTPATIENT
Start: 2020-03-20 | End: 2020-03-24 | Stop reason: HOSPADM

## 2020-03-20 RX ORDER — NITROFURANTOIN 25; 75 MG/1; MG/1
100 CAPSULE ORAL EVERY 12 HOURS SCHEDULED
Status: DISCONTINUED | OUTPATIENT
Start: 2020-03-20 | End: 2020-03-22

## 2020-03-20 RX ORDER — MORPHINE SULFATE 4 MG/ML
4 INJECTION, SOLUTION INTRAMUSCULAR; INTRAVENOUS ONCE
Status: COMPLETED | OUTPATIENT
Start: 2020-03-20 | End: 2020-03-20

## 2020-03-20 RX ORDER — SODIUM CHLORIDE 9 MG/ML
INJECTION, SOLUTION INTRAVENOUS CONTINUOUS
Status: DISCONTINUED | OUTPATIENT
Start: 2020-03-20 | End: 2020-03-21

## 2020-03-20 RX ORDER — SODIUM CHLORIDE 0.9 % (FLUSH) 0.9 %
10 SYRINGE (ML) INJECTION EVERY 12 HOURS SCHEDULED
Status: DISCONTINUED | OUTPATIENT
Start: 2020-03-20 | End: 2020-03-24 | Stop reason: HOSPADM

## 2020-03-20 RX ORDER — MORPHINE SULFATE 2 MG/ML
2 INJECTION, SOLUTION INTRAMUSCULAR; INTRAVENOUS EVERY 4 HOURS PRN
Status: DISCONTINUED | OUTPATIENT
Start: 2020-03-20 | End: 2020-03-20

## 2020-03-20 RX ORDER — ONDANSETRON 2 MG/ML
4 INJECTION INTRAMUSCULAR; INTRAVENOUS ONCE
Status: COMPLETED | OUTPATIENT
Start: 2020-03-20 | End: 2020-03-20

## 2020-03-20 RX ORDER — MORPHINE SULFATE 2 MG/ML
2 INJECTION, SOLUTION INTRAMUSCULAR; INTRAVENOUS
Status: DISCONTINUED | OUTPATIENT
Start: 2020-03-20 | End: 2020-03-22

## 2020-03-20 RX ORDER — SODIUM CHLORIDE 0.9 % (FLUSH) 0.9 %
10 SYRINGE (ML) INJECTION PRN
Status: DISCONTINUED | OUTPATIENT
Start: 2020-03-20 | End: 2020-03-24 | Stop reason: HOSPADM

## 2020-03-20 RX ORDER — ACETAMINOPHEN 650 MG/1
650 SUPPOSITORY RECTAL EVERY 6 HOURS PRN
Status: DISCONTINUED | OUTPATIENT
Start: 2020-03-20 | End: 2020-03-23

## 2020-03-20 RX ORDER — OXYBUTYNIN CHLORIDE 5 MG/1
5 TABLET ORAL 3 TIMES DAILY PRN
Status: DISCONTINUED | OUTPATIENT
Start: 2020-03-20 | End: 2020-03-24 | Stop reason: HOSPADM

## 2020-03-20 RX ORDER — OXYCODONE HYDROCHLORIDE AND ACETAMINOPHEN 5; 325 MG/1; MG/1
1 TABLET ORAL EVERY 6 HOURS PRN
Status: DISCONTINUED | OUTPATIENT
Start: 2020-03-20 | End: 2020-03-22

## 2020-03-20 RX ADMIN — NITROFURANTOIN MONOHYDRATE/MACROCRYSTALLINE 100 MG: 25; 75 CAPSULE ORAL at 15:31

## 2020-03-20 RX ADMIN — SODIUM CHLORIDE: 9 INJECTION, SOLUTION INTRAVENOUS at 15:30

## 2020-03-20 RX ADMIN — SODIUM CHLORIDE: 9 INJECTION, SOLUTION INTRAVENOUS at 09:12

## 2020-03-20 RX ADMIN — SODIUM CHLORIDE: 9 INJECTION, SOLUTION INTRAVENOUS at 16:30

## 2020-03-20 RX ADMIN — SODIUM CHLORIDE: 9 INJECTION, SOLUTION INTRAVENOUS at 13:40

## 2020-03-20 RX ADMIN — MORPHINE SULFATE 2 MG: 2 INJECTION, SOLUTION INTRAMUSCULAR; INTRAVENOUS at 21:37

## 2020-03-20 RX ADMIN — MORPHINE SULFATE 4 MG: 4 INJECTION, SOLUTION INTRAMUSCULAR; INTRAVENOUS at 09:13

## 2020-03-20 RX ADMIN — MORPHINE SULFATE 2 MG: 2 INJECTION, SOLUTION INTRAMUSCULAR; INTRAVENOUS at 15:31

## 2020-03-20 RX ADMIN — MORPHINE SULFATE 4 MG: 4 INJECTION, SOLUTION INTRAMUSCULAR; INTRAVENOUS at 13:40

## 2020-03-20 RX ADMIN — ONDANSETRON 4 MG: 2 INJECTION INTRAMUSCULAR; INTRAVENOUS at 09:13

## 2020-03-20 RX ADMIN — ENOXAPARIN SODIUM 40 MG: 40 INJECTION SUBCUTANEOUS at 15:30

## 2020-03-20 RX ADMIN — MORPHINE SULFATE 4 MG: 4 INJECTION, SOLUTION INTRAMUSCULAR; INTRAVENOUS at 10:30

## 2020-03-20 RX ADMIN — OXYCODONE HYDROCHLORIDE AND ACETAMINOPHEN 1 TABLET: 5; 325 TABLET ORAL at 19:07

## 2020-03-20 RX ADMIN — ONDANSETRON 4 MG: 2 INJECTION INTRAMUSCULAR; INTRAVENOUS at 15:30

## 2020-03-20 ASSESSMENT — ENCOUNTER SYMPTOMS
VOMITING: 1
CHEST TIGHTNESS: 0
ABDOMINAL PAIN: 0
BACK PAIN: 0
BLOOD IN STOOL: 0
VOICE CHANGE: 0
NAUSEA: 1
COUGH: 0
SHORTNESS OF BREATH: 0
TROUBLE SWALLOWING: 0
DIARRHEA: 0

## 2020-03-20 ASSESSMENT — PAIN DESCRIPTION - PAIN TYPE
TYPE: ACUTE PAIN

## 2020-03-20 ASSESSMENT — PAIN SCALES - GENERAL
PAINLEVEL_OUTOF10: 8
PAINLEVEL_OUTOF10: 9
PAINLEVEL_OUTOF10: 10
PAINLEVEL_OUTOF10: 7
PAINLEVEL_OUTOF10: 10
PAINLEVEL_OUTOF10: 9
PAINLEVEL_OUTOF10: 0
PAINLEVEL_OUTOF10: 0
PAINLEVEL_OUTOF10: 8
PAINLEVEL_OUTOF10: 9
PAINLEVEL_OUTOF10: 10
PAINLEVEL_OUTOF10: 9
PAINLEVEL_OUTOF10: 8

## 2020-03-20 ASSESSMENT — PAIN DESCRIPTION - DESCRIPTORS
DESCRIPTORS: CONSTANT;STABBING;SHARP
DESCRIPTORS: SHARP

## 2020-03-20 ASSESSMENT — PAIN DESCRIPTION - FREQUENCY
FREQUENCY: INTERMITTENT
FREQUENCY: CONTINUOUS

## 2020-03-20 ASSESSMENT — PAIN DESCRIPTION - LOCATION
LOCATION: FLANK

## 2020-03-20 ASSESSMENT — PAIN DESCRIPTION - ORIENTATION
ORIENTATION: LEFT

## 2020-03-20 NOTE — ED NOTES
Patient transported to [place] [address].  Charge nurse Trev Kearney notified, primary nurse Tierney notified      Corry De Los Santos  03/20/20 [phone]

## 2020-03-20 NOTE — ED PROVIDER NOTES
[facility]  eMERGENCY dEPARTMENT eNCOUnter          [address] [facility] [address]       Chief Complaint   Patient presents with    Flank Pain     left       Nurses Notes reviewed and I agree except as noted in the HPI. HISTORY OF PRESENT Rosio Wilkins is a 27 y.o. female who presents to the Emergency Department for the evaluation of left flank pain with a history of kidney stones. The patient was most recently diagnosed with urinary tract obstruction on 2019 for which she was admitted. Patient was 13 weeks pregnant at that time. She is currently 29 weeks pregnant with a A1 status. Her previous miscarriage was at 7 weeks gestation. In regards to her admission in December, the patient was admitted and hydrated. OB and urology were consulted. Patient was taken to OR for ureteral stent placement. She tolerated that well and was discharged 3 days later on  with Percocet and Reglan for symptom control approved by OB. Patient has had several visits since with exchange of the ureteral stent. She follows closely with urology. Her pain significantly worsened overnight. The patient reports nausea and vomiting this morning. She last took Percocet around 1800 last evening. She rates her current pain at a 10/10 in severity. She reports frequent urination. She denies dysuria or hematuria. She denies fever/chills. There are no other complaints, symptoms, or concerns at this time. Location/Symptom: left flank pain  Timing/Onset: chronic since December  Context/Setting: worsening overnight  Quality: aching  Duration: constant  Modifying Factors: Percocet - last dose at 1800 yesterday  Severity: 10/10    The HPI was provided by the patient. REVIEW OF SYSTEMS     Review of Systems   Constitutional: Negative for chills, diaphoresis and fever. HENT: Negative for trouble swallowing and voice change. Eyes: Negative for visual disturbance.    Respiratory: Negative for cough, chest tightness and shortness of breath. Cardiovascular: Negative for chest pain and leg swelling. Gastrointestinal: Positive for nausea and vomiting. Negative for abdominal pain, blood in stool and diarrhea. Genitourinary: Positive for flank pain (left) and frequency. Negative for difficulty urinating, dysuria and hematuria. Musculoskeletal: Negative for back pain and neck pain. Skin: Negative for rash and wound. Neurological: Negative for speech difficulty, weakness, numbness and headaches. Psychiatric/Behavioral: Negative for confusion. PAST MEDICAL HISTORY    has a past medical history of Kidney stone and PONV (postoperative nausea and vomiting). SURGICAL HISTORY      has a past surgical history that includes Tympanostomy tube placement; other surgical history (16 April 2015); Dilation and curettage of uterus (12/21/15); Tonsillectomy; cysto/uretero/pyeloscopy, calculus tx (Left, 12/2/2019); CYSTOSCOPY INSERTION / REMOVAL STENT / STONE (Left, 1/27/2020); and CYSTOSCOPY INSERTION / REMOVAL STENT / STONE (N/A, 3/2/2020). CURRENT MEDICATIONS       Current Discharge Medication List      CONTINUE these medications which have NOT CHANGED    Details   oxyCODONE-acetaminophen (PERCOCET) 5-325 MG per tablet Take 1 tablet by mouth every 6 hours as needed for Pain. oxybutynin (DITROPAN) 5 MG tablet Take 1 tablet by mouth 3 times daily as needed (stent pain)  Qty: 90 tablet, Refills: 0      Pediatric Multivit-Minerals-C (FLINTSTONES GUMMIES PLUS PO) Take 1 tablet by mouth daily              ALLERGIES     has No Known Allergies. FAMILY HISTORY     She indicated that her mother is alive. She indicated that her father is alive. She indicated that the status of her paternal grandfather is unknown.   family history includes Heart Disease in her father and paternal grandfather. SOCIAL HISTORY      reports that she quit smoking about 3 months ago. Her smoking use included cigarettes.  She has never used smokeless tobacco. She reports current drug use. Drug: Marijuana. She reports that she does not drink alcohol. PHYSICAL EXAM     INITIAL VITALS:  height is 5' 1\" (1.549 m) and weight is 153 lb (69.4 kg). Her oral temperature is 98.2 °F (36.8 °C). Her blood pressure is 116/77 and her pulse is 74. Her respiration is 17 and oxygen saturation is 99%. Physical Exam  Vitals signs and nursing note reviewed. Constitutional:       Appearance: She is well-developed. She is not diaphoretic. HENT:      Head: Normocephalic and atraumatic. Right Ear: External ear normal.      Left Ear: External ear normal.   Eyes:      General: No scleral icterus. Right eye: No discharge. Left eye: No discharge. Conjunctiva/sclera: Conjunctivae normal.   Neck:      Musculoskeletal: Normal range of motion and neck supple. Vascular: No JVD. Cardiovascular:      Rate and Rhythm: Normal rate and regular rhythm. Pulmonary:      Effort: Pulmonary effort is normal. No respiratory distress. Breath sounds: No stridor. Abdominal:      General: There is no distension. Palpations: Abdomen is soft. Tenderness: There is no abdominal tenderness. There is left CVA tenderness. Musculoskeletal: Normal range of motion. Skin:     General: Skin is warm and dry. Findings: No erythema. Neurological:      Mental Status: She is alert and oriented to person, place, and time. Motor: No abnormal muscle tone. Psychiatric:         Behavior: Behavior normal.       DIFFERENTIAL DIAGNOSIS:   Included but not limited to: Renal colic, Hydronephrosis, Pain due to ureteral stent    DIAGNOSTIC RESULTS     EKG: All EKG's are interpretedby the Emergency Department Physician who either signs or Co-signs this chart in the absence of a cardiologist.    None     RADIOLOGY: non-plain film images(s) such as CT, Ultrasound and MRI are read by the radiologist.       US RENAL COMPLETE   Final Result   1.  There is Patient was given additional dose of morphine and Zofran to use often pain. Patient also given fentanyl. Patient will be admitted for pain control. I did discuss the case with Dr. Sergio Gustafson as well as urology here. We also discussed the case with [facility]rology [facility]. Urology [facility] states that they cannot go after the stone because of the size and stage of her pregnancy. They recommended possibly a nephrostomy tube. I did discuss this with the patient's obstetrician. She is okay with the patient staying here if this is the plan of care. The patient needs to be admitted for pain control. Dr. Sergio Gustafson felt there is no need for any further monitoring in the ED. She will need a nonstress test before and after any procedure urology does however. Fetal heart tones were normal    CRITICALCARE:   None     CONSULTS:  Dr Sergio Gustafson, Urology at [facility], Rudy Garcia NP, Julio Cesar Sharpe NP    PROCEDURES:  None     FINAL IMPRESSION      1. Renal colic          DISPOSITION/PLAN   Admit      PATIENT REFERRED TO:  Peter Caceres MD  [address] [place] [address] [address]  [phone]            DISCHARGE MEDICATIONS:  Current Discharge Medication List          (Please note that portions of this note were completed with a voice recognition program.  Efforts weremade to edit the dictations but occasionally words are mis-transcribed.)    Scribe:  Signed by: Duane Briscoe, 03/20/20 8:51 AM EDT Scribingfor and in the presence of Annette Pickering PA-C    Provider:  I personally performed the services described in the documentation, reviewed and edited the documentation which was dictated to the scribe in mypresence, and it accurately records my words and actions.     Annette Pickering PA-C 03/20/20 3:04 PM        MARQUEZ Severino  03/20/20 [id number]

## 2020-03-20 NOTE — H&P
1/27/2020    CYSTOSCOPY WITH LEFT URETERAL STENT EXCHANGE UNDER ULTRASOUND GUIDANCE [facility] [address] / STONE N/A 3/2/2020    CYSTOSCOPY WITH ULTRASOUND GUIDED LEFT URETERAL STENT EXCHANGE performed by Robert Tim MD at [address]  12/21/15    miscarriage    OTHER SURGICAL HISTORY  16 April 2015    Cystoscopy with right ureteroscopy basket stone removal stent placement (Dr. Francy Reyes, [facility])   [address]      TYMPANOSTOMY TUBE PLACEMENT         Home Medications:   No current facility-administered medications on file prior to encounter. Current Outpatient Medications on File Prior to Encounter   Medication Sig Dispense Refill    oxybutynin (DITROPAN) 5 MG tablet Take 1 tablet by mouth 3 times daily as needed (stent pain) 90 tablet 0    Pediatric Multivit-Minerals-C (FLINTSTONES GUMMIES PLUS PO) Take by mouth         Allergies:    Patient has no known allergies. Social History:    reports that she quit smoking about 3 months ago. Her smoking use included cigarettes. She has never used smokeless tobacco. She reports current drug use. Drug: Marijuana. She reports that she does not drink alcohol. Family History:       Problem Relation Age of Onset    Heart Disease Father     Heart Disease Paternal Grandfather        Diet:  No diet orders on file    Review of systems:   Pertinent positives as noted in the HPI. All other systems reviewed and negative. PHYSICAL EXAM:  /68   Pulse 75   Temp 98.2 °F (36.8 °C) (Oral)   Resp 16   Ht 5' 1\" (1.549 m)   Wt 153 lb (69.4 kg)   LMP 08/26/2019 Comment: 26 WEEKS PREGNANT  SpO2 97%   BMI 28.91 kg/m²   General appearance: No apparent distress, appears stated age and cooperative. HEENT: Normal cephalic, atraumatic without obvious deformity. Pupils equal, round, and reactive to light. Extra ocular muscles intact.  Conjunctivae/corneas clear.  Neck: Supple, with full range of motion. No jugular venous distention. Trachea midline. Respiratory:  Normal respiratory effort. Clear to auscultation, bilaterally without Rales/Wheezes/Rhonchi. Cardiovascular: Regular rate and rhythm with normal S1/S2 without murmurs, rubs or gallops. Abdomen: Soft, non-tender, non-distended with normal bowel sounds. Musculoskeletal:  No clubbing, cyanosis or edema bilaterally. Skin: Skin color, texture, turgor normal.  No rashes or lesions. Neurologic:  Neurovascularly intact without any focal sensory/motor deficits. Cranial nerves: II-XII intact, grossly non-focal.  Psychiatric: Alert and oriented, thought content appropriate, normal insight  Capillary Refill: Brisk,< 3 seconds   Peripheral Pulses: +2 palpable, equal bilaterally     Labs:   Recent Labs     03/20/20  0914   WBC 10.3   HGB 11.4*   HCT 34.6*        Recent Labs     03/20/20  0914      K 4.1      CO2 19*   BUN 8   CREATININE 0.5   CALCIUM 8.4*     Recent Labs     03/20/20  0914   AST 13   ALT 6*   BILIDIR <0.2   BILITOT <0.2*   ALKPHOS 48     No results for input(s): INR in the last 72 hours. No results for input(s): Carlson Flatter in the last 72 hours. Urinalysis:    Lab Results   Component Value Date    NITRU NEGATIVE 03/20/2020    WBCUA 50-75 03/20/2020    WBCUA 11-20 09/30/2019    BACTERIA FEW 03/20/2020    RBCUA 25-50 03/20/2020    BLOODU MODERATE 03/20/2020    SPECGRAV 1.016 03/05/2020    GLUCOSEU NEGATIVE 03/20/2020       Radiology:   US RENAL COMPLETE   Final Result   1. There is mild to moderate left-sided hydronephrosis with left-sided nephrolithiasis measuring up to 1.6 x 1.9 x 1 cm. There are also bladder stones identified. Consider further evaluation with CT for further characterization to evaluate for possible    staghorn calculi. 2. Cholelithiasis is also incidentally demonstrated. **This report has been created using voice recognition software.   It may Baylee Bah on 3/20/2020 10:30 AM        EKG: No prior ECG    Electronically signed by Aparna Duran PA-C on 3/20/2020 at 1:36 PM

## 2020-03-20 NOTE — CONSULTS
Urology Consult Note      Reason for Consult:  Nephrolithiasis, hydronephrosis  Requesting Physician:  MARQUEZ Kim    History Obtained From:  patient    Chief Complaint: flank pain    HISTORY OF PRESENT ILLNESS:                The patient is a 27 y.o. female 34 weeks pregnant with left UPJ stone and ureteral stent. She underwent cystoscopy with left sided stent placement initially 12/2/19 by Dr. Michael Peralta with most recent stent exchange 3/2/2020. Pt was recommended to continue with every 5 week stent exchanges at that time. Pt reports increased pain on the left side and back \"where the stent is\" over the last 4 days that increased in intensity to 10/10 today causing her to present to the ER. She denies measurable fever or chilling. Notes she had an episode of diaphoresis when her pain was severe earlier today. Relates to nausea without vomiting today. Denies hematuria. Admits to trouble having bowel movements recently which she relates to the Percocet she has been taking at home for pain. She reports she did have a bowel movement this am but it was hard.        Past Medical History:        Diagnosis Date    Kidney stone 2015    rt    PONV (postoperative nausea and vomiting)      Past Surgical History:        Procedure Laterality Date    CYSTO/URETERO/PYELOSCOPY, CALCULUS TX Left 12/2/2019    CYSTOCOPY, LEFT STENT placement  performed by Trever Reynolds MD at [address] / STONE Left 1/27/2020    CYSTOSCOPY WITH LEFT URETERAL STENT EXCHANGE UNDER ULTRASOUND GUIDANCE performed by Trever Reynolds MD at [address] / [address] / STONE N/A 3/2/2020    CYSTOSCOPY WITH ULTRASOUND GUIDED LEFT URETERAL STENT EXCHANGE performed by Trever Reynolds MD at [address]  12/21/15    miscarriage    OTHER SURGICAL HISTORY  16 April 2015    Cystoscopy with right ureteroscopy basket stone removal stent placement ([facility]. Anupam Mclaughlin, [facility])    TONSILLECTOMY      TYMPANOSTOMY TUBE PLACEMENT         Social History     Socioeconomic History    Marital status: Single     Spouse name: Not on file    Number of children: Not on file    Years of education: Not on file    Highest education level: Not on file   Occupational History    Not on file   Social Needs    Financial resource strain: Not on file    Food insecurity     Worry: Not on file     Inability: Not on file    Transportation needs     Medical: Not on file     Non-medical: Not on file   Tobacco Use    Smoking status: Former Smoker     Types: Cigarettes     Last attempt to quit: 2019     Years since quittin.3    Smokeless tobacco: Never Used    Tobacco comment: Vape   Substance and Sexual Activity    Alcohol use: No    Drug use: Yes     Types: Marijuana     Comment: LAST USED 2020    Sexual activity: Yes     Partners: Male   Lifestyle    Physical activity     Days per week: Not on file     Minutes per session: Not on file    Stress: Not on file   Relationships    Social connections     Talks on phone: Not on file     Gets together: Not on file     Attends Mandaeism service: Not on file     Active member of club or organization: Not on file     Attends meetings of clubs or organizations: Not on file     Relationship status: Not on file    Intimate partner violence     Fear of current or ex partner: Not on file     Emotionally abused: Not on file     Physically abused: Not on file     Forced sexual activity: Not on file   Other Topics Concern    Not on file   Social History Narrative    Not on file       AL  Family History   Problem Relation Age of Onset    Heart Disease Father     Heart Disease Paternal Grandfather        Allergies:  No Known Allergies    ROS:  Constitutional: Negative for chills, fatigue, fever, or weight loss. Eyes: Denies reported visual changes.   ENT: Denies headache, difficulty swallowing, nose bleeds, ringing in undergoing exchanges every 5 weeks by Dr. Bang Davenport. Presents with increased pain. Creatinine and WBC's normal.  Afebrile. Renal US with findings of mild to mod hydro on the side of the stent. There may be some reflux that is showing up as hydro however given her pain discussed possible stent exchange with [place]. We discussed that she may continue with pain despite the exchange. She is aware of the option for possible nephrostomy but would like to avoid that option if at all possible. She would like to proceed with stent exchange. I described the procedure in detail and also described the associated risks and benefits at length. We discussed possible alternative therapies. We discussed the risks and benefits of not undergoing therapy. Patient understands these risks and benefits and desires to proceed. Keep pt NPO after midnight. Obtain Consent. Plan for cystoscopy, left sided stent exchange tomorrow 3/21/2020 by Dr. Ryanne Leon. Possible MAC sedation. Continue analgesics, antiemetics. Start Stool softeners for constipation which may be contributing to pain. Please call urology with any questions or concerns.         Thank you for including us in the care of Marta Chandler, LEV-CNP  03/20/20 3:44 PM  Urology

## 2020-03-21 ENCOUNTER — ANESTHESIA EVENT (OUTPATIENT)
Dept: OPERATING ROOM | Age: 25
DRG: 818 | End: 2020-03-21
Payer: COMMERCIAL

## 2020-03-21 ENCOUNTER — ANESTHESIA (OUTPATIENT)
Dept: OPERATING ROOM | Age: 25
DRG: 818 | End: 2020-03-21
Payer: COMMERCIAL

## 2020-03-21 VITALS — OXYGEN SATURATION: 99 % | DIASTOLIC BLOOD PRESSURE: 52 MMHG | SYSTOLIC BLOOD PRESSURE: 90 MMHG

## 2020-03-21 LAB
CREATININE URINE: 20 MG/DL
ORGANISM: ABNORMAL
PROT/CREAT RATIO, UR: 3.09
PROTEIN, URINE: 61.8 MG/DL
URIC ACID: 4.2 MG/DL (ref 2.4–5.7)
URINE CULTURE REFLEX: ABNORMAL

## 2020-03-21 PROCEDURE — 2580000003 HC RX 258: Performed by: PHYSICIAN ASSISTANT

## 2020-03-21 PROCEDURE — 0TC78ZZ EXTIRPATION OF MATTER FROM LEFT URETER, VIA NATURAL OR ARTIFICIAL OPENING ENDOSCOPIC: ICD-10-PCS | Performed by: UROLOGY

## 2020-03-21 PROCEDURE — 2709999900 HC NON-CHARGEABLE SUPPLY: Performed by: UROLOGY

## 2020-03-21 PROCEDURE — 6370000000 HC RX 637 (ALT 250 FOR IP): Performed by: UROLOGY

## 2020-03-21 PROCEDURE — 3700000000 HC ANESTHESIA ATTENDED CARE: Performed by: UROLOGY

## 2020-03-21 PROCEDURE — C1769 GUIDE WIRE: HCPCS | Performed by: UROLOGY

## 2020-03-21 PROCEDURE — 6370000000 HC RX 637 (ALT 250 FOR IP): Performed by: PHYSICIAN ASSISTANT

## 2020-03-21 PROCEDURE — 7100000000 HC PACU RECOVERY - FIRST 15 MIN: Performed by: UROLOGY

## 2020-03-21 PROCEDURE — 82570 ASSAY OF URINE CREATININE: CPT

## 2020-03-21 PROCEDURE — 6360000002 HC RX W HCPCS

## 2020-03-21 PROCEDURE — 94761 N-INVAS EAR/PLS OXIMETRY MLT: CPT

## 2020-03-21 PROCEDURE — 6360000002 HC RX W HCPCS: Performed by: UROLOGY

## 2020-03-21 PROCEDURE — 6370000000 HC RX 637 (ALT 250 FOR IP): Performed by: NURSE PRACTITIONER

## 2020-03-21 PROCEDURE — 2580000003 HC RX 258: Performed by: NURSE ANESTHETIST, CERTIFIED REGISTERED

## 2020-03-21 PROCEDURE — 6360000002 HC RX W HCPCS: Performed by: PHYSICIAN ASSISTANT

## 2020-03-21 PROCEDURE — 84156 ASSAY OF PROTEIN URINE: CPT

## 2020-03-21 PROCEDURE — 36415 COLL VENOUS BLD VENIPUNCTURE: CPT

## 2020-03-21 PROCEDURE — 3600000013 HC SURGERY LEVEL 3 ADDTL 15MIN: Performed by: UROLOGY

## 2020-03-21 PROCEDURE — 2580000003 HC RX 258: Performed by: UROLOGY

## 2020-03-21 PROCEDURE — 84550 ASSAY OF BLOOD/URIC ACID: CPT

## 2020-03-21 PROCEDURE — 0T778DZ DILATION OF LEFT URETER WITH INTRALUMINAL DEVICE, VIA NATURAL OR ARTIFICIAL OPENING ENDOSCOPIC: ICD-10-PCS | Performed by: UROLOGY

## 2020-03-21 PROCEDURE — 2720000010 HC SURG SUPPLY STERILE: Performed by: UROLOGY

## 2020-03-21 PROCEDURE — 1200000000 HC SEMI PRIVATE

## 2020-03-21 PROCEDURE — 6360000002 HC RX W HCPCS: Performed by: NURSE ANESTHETIST, CERTIFIED REGISTERED

## 2020-03-21 PROCEDURE — 0TF78ZZ FRAGMENTATION IN LEFT URETER, VIA NATURAL OR ARTIFICIAL OPENING ENDOSCOPIC: ICD-10-PCS | Performed by: UROLOGY

## 2020-03-21 PROCEDURE — 0TP98DZ REMOVAL OF INTRALUMINAL DEVICE FROM URETER, VIA NATURAL OR ARTIFICIAL OPENING ENDOSCOPIC: ICD-10-PCS | Performed by: UROLOGY

## 2020-03-21 PROCEDURE — 7100000001 HC PACU RECOVERY - ADDTL 15 MIN: Performed by: UROLOGY

## 2020-03-21 PROCEDURE — 99232 SBSQ HOSP IP/OBS MODERATE 35: CPT | Performed by: FAMILY MEDICINE

## 2020-03-21 PROCEDURE — 6360000002 HC RX W HCPCS: Performed by: STUDENT IN AN ORGANIZED HEALTH CARE EDUCATION/TRAINING PROGRAM

## 2020-03-21 PROCEDURE — 3700000001 HC ADD 15 MINUTES (ANESTHESIA): Performed by: UROLOGY

## 2020-03-21 PROCEDURE — 6360000002 HC RX W HCPCS: Performed by: ANESTHESIOLOGY

## 2020-03-21 PROCEDURE — 3600000003 HC SURGERY LEVEL 3 BASE: Performed by: UROLOGY

## 2020-03-21 PROCEDURE — C2617 STENT, NON-COR, TEM W/O DEL: HCPCS | Performed by: UROLOGY

## 2020-03-21 PROCEDURE — C1758 CATHETER, URETERAL: HCPCS | Performed by: UROLOGY

## 2020-03-21 DEVICE — URETERAL STENT
Type: IMPLANTABLE DEVICE | Status: FUNCTIONAL
Brand: PERCUFLEX™ PLUS

## 2020-03-21 RX ORDER — PROPOFOL 10 MG/ML
INJECTION, EMULSION INTRAVENOUS PRN
Status: DISCONTINUED | OUTPATIENT
Start: 2020-03-21 | End: 2020-03-21 | Stop reason: SDUPTHER

## 2020-03-21 RX ORDER — FENTANYL CITRATE 50 UG/ML
50 INJECTION, SOLUTION INTRAMUSCULAR; INTRAVENOUS EVERY 5 MIN PRN
Status: DISCONTINUED | OUTPATIENT
Start: 2020-03-21 | End: 2020-03-21 | Stop reason: HOSPADM

## 2020-03-21 RX ORDER — LIDOCAINE HYDROCHLORIDE 20 MG/ML
INJECTION, SOLUTION INTRAVENOUS PRN
Status: DISCONTINUED | OUTPATIENT
Start: 2020-03-21 | End: 2020-03-21 | Stop reason: SDUPTHER

## 2020-03-21 RX ORDER — ONDANSETRON 2 MG/ML
4 INJECTION INTRAMUSCULAR; INTRAVENOUS
Status: COMPLETED | OUTPATIENT
Start: 2020-03-21 | End: 2020-03-21

## 2020-03-21 RX ORDER — FENTANYL CITRATE 50 UG/ML
INJECTION, SOLUTION INTRAMUSCULAR; INTRAVENOUS PRN
Status: DISCONTINUED | OUTPATIENT
Start: 2020-03-21 | End: 2020-03-21 | Stop reason: SDUPTHER

## 2020-03-21 RX ORDER — FENTANYL CITRATE 50 UG/ML
25 INJECTION, SOLUTION INTRAMUSCULAR; INTRAVENOUS EVERY 5 MIN PRN
Status: DISCONTINUED | OUTPATIENT
Start: 2020-03-21 | End: 2020-03-21 | Stop reason: HOSPADM

## 2020-03-21 RX ORDER — SODIUM CHLORIDE 9 MG/ML
INJECTION, SOLUTION INTRAVENOUS CONTINUOUS PRN
Status: DISCONTINUED | OUTPATIENT
Start: 2020-03-21 | End: 2020-03-21 | Stop reason: SDUPTHER

## 2020-03-21 RX ADMIN — OXYCODONE HYDROCHLORIDE AND ACETAMINOPHEN 1 TABLET: 5; 325 TABLET ORAL at 21:46

## 2020-03-21 RX ADMIN — HYDROMORPHONE HYDROCHLORIDE 0.5 MG: 1 INJECTION, SOLUTION INTRAMUSCULAR; INTRAVENOUS; SUBCUTANEOUS at 19:47

## 2020-03-21 RX ADMIN — DOCUSATE SODIUM 100 MG: 100 CAPSULE, LIQUID FILLED ORAL at 08:31

## 2020-03-21 RX ADMIN — FENTANYL CITRATE 50 MCG: 50 INJECTION, SOLUTION INTRAMUSCULAR; INTRAVENOUS at 13:35

## 2020-03-21 RX ADMIN — DOCUSATE SODIUM 100 MG: 100 CAPSULE, LIQUID FILLED ORAL at 21:46

## 2020-03-21 RX ADMIN — MORPHINE SULFATE 2 MG: 2 INJECTION, SOLUTION INTRAMUSCULAR; INTRAVENOUS at 15:19

## 2020-03-21 RX ADMIN — SODIUM CHLORIDE, PRESERVATIVE FREE 10 ML: 5 INJECTION INTRAVENOUS at 21:46

## 2020-03-21 RX ADMIN — FENTANYL CITRATE 50 MCG: 50 INJECTION, SOLUTION INTRAMUSCULAR; INTRAVENOUS at 14:08

## 2020-03-21 RX ADMIN — SODIUM CHLORIDE: 9 INJECTION, SOLUTION INTRAVENOUS at 15:28

## 2020-03-21 RX ADMIN — OXYCODONE HYDROCHLORIDE AND ACETAMINOPHEN 1 TABLET: 5; 325 TABLET ORAL at 08:31

## 2020-03-21 RX ADMIN — CEFAZOLIN 2 G: 10 INJECTION, POWDER, FOR SOLUTION INTRAVENOUS at 13:30

## 2020-03-21 RX ADMIN — OXYBUTYNIN CHLORIDE 5 MG: 5 TABLET ORAL at 08:31

## 2020-03-21 RX ADMIN — ONDANSETRON 4 MG: 2 INJECTION INTRAMUSCULAR; INTRAVENOUS at 15:19

## 2020-03-21 RX ADMIN — PROPOFOL 200 MG: 10 INJECTION, EMULSION INTRAVENOUS at 13:15

## 2020-03-21 RX ADMIN — SODIUM CHLORIDE, PRESERVATIVE FREE 10 ML: 5 INJECTION INTRAVENOUS at 08:33

## 2020-03-21 RX ADMIN — FENTANYL CITRATE 50 MCG: 50 INJECTION, SOLUTION INTRAMUSCULAR; INTRAVENOUS at 13:25

## 2020-03-21 RX ADMIN — SODIUM CHLORIDE: 9 INJECTION, SOLUTION INTRAVENOUS at 13:30

## 2020-03-21 RX ADMIN — HYDROMORPHONE HYDROCHLORIDE 0.5 MG: 1 INJECTION, SOLUTION INTRAMUSCULAR; INTRAVENOUS; SUBCUTANEOUS at 16:06

## 2020-03-21 RX ADMIN — ONDANSETRON 4 MG: 2 INJECTION INTRAMUSCULAR; INTRAVENOUS at 21:46

## 2020-03-21 RX ADMIN — ENOXAPARIN SODIUM 40 MG: 40 INJECTION SUBCUTANEOUS at 16:05

## 2020-03-21 RX ADMIN — ONDANSETRON 4 MG: 2 INJECTION INTRAMUSCULAR; INTRAVENOUS at 14:09

## 2020-03-21 RX ADMIN — MORPHINE SULFATE 2 MG: 2 INJECTION, SOLUTION INTRAMUSCULAR; INTRAVENOUS at 18:31

## 2020-03-21 RX ADMIN — SODIUM CHLORIDE: 9 INJECTION, SOLUTION INTRAVENOUS at 13:13

## 2020-03-21 RX ADMIN — SODIUM CHLORIDE: 9 INJECTION, SOLUTION INTRAVENOUS at 04:03

## 2020-03-21 RX ADMIN — OXYBUTYNIN CHLORIDE 5 MG: 5 TABLET ORAL at 14:13

## 2020-03-21 RX ADMIN — NITROFURANTOIN MONOHYDRATE/MACROCRYSTALLINE 100 MG: 25; 75 CAPSULE ORAL at 21:46

## 2020-03-21 RX ADMIN — NITROFURANTOIN MONOHYDRATE/MACROCRYSTALLINE 100 MG: 25; 75 CAPSULE ORAL at 11:01

## 2020-03-21 RX ADMIN — LIDOCAINE HYDROCHLORIDE 100 MG: 20 INJECTION, SOLUTION INTRAVENOUS at 13:15

## 2020-03-21 RX ADMIN — MORPHINE SULFATE 2 MG: 2 INJECTION, SOLUTION INTRAMUSCULAR; INTRAVENOUS at 23:40

## 2020-03-21 RX ADMIN — OXYCODONE HYDROCHLORIDE AND ACETAMINOPHEN 1 TABLET: 5; 325 TABLET ORAL at 14:00

## 2020-03-21 ASSESSMENT — PAIN DESCRIPTION - PROGRESSION
CLINICAL_PROGRESSION: NOT CHANGED
CLINICAL_PROGRESSION: NOT CHANGED

## 2020-03-21 ASSESSMENT — PULMONARY FUNCTION TESTS
PIF_VALUE: 8
PIF_VALUE: 3
PIF_VALUE: 3
PIF_VALUE: 6
PIF_VALUE: 9
PIF_VALUE: 9
PIF_VALUE: 7
PIF_VALUE: 9
PIF_VALUE: 8
PIF_VALUE: 10
PIF_VALUE: 3
PIF_VALUE: 2
PIF_VALUE: 10
PIF_VALUE: 3
PIF_VALUE: 4
PIF_VALUE: 9
PIF_VALUE: 4
PIF_VALUE: 10
PIF_VALUE: 3
PIF_VALUE: 3
PIF_VALUE: 2
PIF_VALUE: 9
PIF_VALUE: 6
PIF_VALUE: 3
PIF_VALUE: 8
PIF_VALUE: 10
PIF_VALUE: 9
PIF_VALUE: 16
PIF_VALUE: 8
PIF_VALUE: 3
PIF_VALUE: 2

## 2020-03-21 ASSESSMENT — PAIN DESCRIPTION - PAIN TYPE
TYPE: SURGICAL PAIN
TYPE: ACUTE PAIN
TYPE: ACUTE PAIN;SURGICAL PAIN

## 2020-03-21 ASSESSMENT — PAIN SCALES - GENERAL
PAINLEVEL_OUTOF10: 9
PAINLEVEL_OUTOF10: 8
PAINLEVEL_OUTOF10: 10
PAINLEVEL_OUTOF10: 9
PAINLEVEL_OUTOF10: 10
PAINLEVEL_OUTOF10: 9
PAINLEVEL_OUTOF10: 7
PAINLEVEL_OUTOF10: 9
PAINLEVEL_OUTOF10: 3
PAINLEVEL_OUTOF10: 10

## 2020-03-21 ASSESSMENT — PAIN DESCRIPTION - DIRECTION
RADIATING_TOWARDS: ABDOMEN
RADIATING_TOWARDS: ANTERIOR ABDOMEN

## 2020-03-21 ASSESSMENT — PAIN DESCRIPTION - LOCATION
LOCATION: FLANK
LOCATION: FLANK

## 2020-03-21 ASSESSMENT — PAIN DESCRIPTION - ORIENTATION
ORIENTATION: LEFT
ORIENTATION: LEFT

## 2020-03-21 ASSESSMENT — PAIN DESCRIPTION - FREQUENCY
FREQUENCY: CONTINUOUS
FREQUENCY: CONTINUOUS

## 2020-03-21 ASSESSMENT — PAIN DESCRIPTION - ONSET
ONSET: ON-GOING
ONSET: ON-GOING

## 2020-03-21 ASSESSMENT — PAIN DESCRIPTION - DESCRIPTORS
DESCRIPTORS: SHARP;CRAMPING
DESCRIPTORS: SHARP;CRAMPING

## 2020-03-21 ASSESSMENT — PAIN - FUNCTIONAL ASSESSMENT: PAIN_FUNCTIONAL_ASSESSMENT: ACTIVITIES ARE NOT PREVENTED

## 2020-03-21 NOTE — ANESTHESIA PRE PROCEDURE
(macrocrystal-monohydrate) (MACROBID) capsule 100 mg  100 mg Oral 2 times per day MARQUEZ Fletcher-C   100 mg at 03/21/20 1101    0.9 % sodium chloride infusion   Intravenous Continuous Cheryl Loomis PA-C 100 mL/hr at 03/21/20 0403      docusate sodium (COLACE) capsule 100 mg  100 mg Oral BID Jeane Barnwell, APRN - CNP   100 mg at 03/21/20 0831    0.9 % sodium chloride infusion   Intravenous Continuous Do Long 100 mL/hr at 03/20/20 1630      ceFAZolin (ANCEF) 2 g in dextrose 5 % 50 mL IVPB  2 g Intravenous 30 Min Pre-Op Do Tristan        oxyCODONE-acetaminophen (PERCOCET) 5-325 MG per tablet 1 tablet  1 tablet Oral Q6H PRN ANDREA FletcherC   1 tablet at 03/21/20 0831    morphine (PF) injection 2 mg  2 mg Intravenous Q3H PRN MARQUEZ Fletcher-C   2 mg at 03/20/20 2137       Allergies:  No Known Allergies    Problem List:    Patient Active Problem List   Diagnosis Code    Renal colic H05    Oligohydramnios O41.00X0    Pyelonephritis, acute N10    Urinary tract obstruction by kidney stone N20.0, N13.8    13 weeks gestation of pregnancy Z3A.13    Left lower quadrant abdominal pain R10.32    Hydronephrosis of left kidney N13.30    Left ureteral stone N20.1    Nausea and vomiting R11.2    Leukocytosis D72.829    Normocytic anemia D64.9    Hypokalemia V50.6    Metabolic acidosis V29.1    Marijuana abuse F12.10    Kidney stone N20.0    Nephrolithiasis N20.0       Past Medical History:        Diagnosis Date    Kidney stone 2015    rt    PONV (postoperative nausea and vomiting)        Past Surgical History:        Procedure Laterality Date    CYSTO/URETERO/PYELOSCOPY, CALCULUS TX Left 12/2/2019    CYSTOCOPY, LEFT STENT placement  performed by Kip Davey MD at [facility] / Cory Reyna / Ian Cosby Left 1/27/2020    CYSTOSCOPY WITH LEFT URETERAL STENT EXCHANGE UNDER ULTRASOUND GUIDANCE performed by Kip Davey MD at [place] [facility]

## 2020-03-21 NOTE — PROGRESS NOTES
Called and updated pt's father James Sandoval at this time. All questions answered, understanding verbalized. No concerns stated.

## 2020-03-21 NOTE — PROGRESS NOTES
PROGRESS NOTE      Patient:  Severino Garcia      Unit/Bed:5K-02002-A    YOB: 1995    MRN: [id number]       Acct: [de-identified]     PCP: Kary Moore MD    Date of Admission: 3/20/2020      Assessment/Plan:    Anticipated Discharge in:1 day     Active Hospital Problems    Diagnosis Date Noted    Nephrolithiasis [N20.0] 2020       Left sided nephrolithiasis with hydronephrosis   1.6x1. 9x1 cm   Urology following, management per urology    Bladder stones   Seen on renal US   Urology following; management per urology     UTI, poa without sepsis   UA mod blood, 100 protein, mod leukocyte esterase  Urine culture demonstrates mixed growth   Nitrofurantoin PO started 3/20/20     , 29 weeks 3 days gestation  LAMIN 6/3/2020; Obgyn Dr. Gauri Huggins  Pregnancy cx by nephrolithiasis s/p stents q5-6 weeks and failed 1 hr GTT. Monitoring blood sugars at home, not on meds. Contacted Dr. Gauri Huggins so she is aware that she is here   FHT q shift    Proteinuria  UA had protein;  BP 93/58; will monitor for signs/symptoms of preeclampsia but most likely this is due to nephrolithiasis   Asymptomatic. 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 Gauri Huggins, who presents to the ED for evaluation of left flank pain for the last three days. Pt reports hx renal stones, since the age of 8, with first ureteral stent placed Dec 2019.  Pt acetaminophen **OR** acetaminophen, ondansetron, oxyCODONE-acetaminophen, morphine      Intake/Output Summary (Last 24 hours) at 3/21/2020 0750  Last data filed at 3/20/2020 2047  Gross per 24 hour   Intake 0 ml   Output --   Net 0 ml       Diet:  Diet NPO, After Midnight    Exam:  BP (!) 83/48   Pulse 66   Temp 98.3 °F (36.8 °C) (Oral)   Resp 16   Ht 5' 1\" (1.549 m)   Wt 153 lb (69.4 kg)   LMP 08/26/2019 Comment: 26 WEEKS PREGNANT  SpO2 98%   BMI 28.91 kg/m²     General appearance: No apparent distress, appears stated age and cooperative. HEENT: Pupils equal, round, and reactive to light. Conjunctivae/corneas clear. Neck: Supple, with full range of motion. No jugular venous distention. Trachea midline. Respiratory:  Normal respiratory effort. Clear to auscultation, bilaterally without Rales/Wheezes/Rhonchi. Cardiovascular: Regular rate and rhythm with normal S1/S2 without murmurs, rubs or gallops. Abdomen: Gravid,  non-tender,  normal bowel sounds. Musculoskeletal: passive and active ROM x 4 extremities. 2+ pitting edema. Skin: Skin color, texture, turgor normal.  No rashes or lesions. Neurologic:  Neurovascularly intact without any focal sensory/motor deficits. Cranial nerves: II-XII intact, grossly non-focal.  Psychiatric: Alert and oriented, thought content appropriate, normal insight  Capillary Refill: Brisk,< 3 seconds   Peripheral Pulses: +2 palpable, equal bilaterally       Labs:   Recent Labs     03/20/20  0914   WBC 10.3   HGB 11.4*   HCT 34.6*        Recent Labs     03/20/20  0914      K 4.1      CO2 19*   BUN 8   CREATININE 0.5   CALCIUM 8.4*     Recent Labs     03/20/20  0914   AST 13   ALT 6*   BILIDIR <0.2   BILITOT <0.2*   ALKPHOS 48     No results for input(s): INR in the last 72 hours. No results for input(s): Da Flores in the last 72 hours.     Urinalysis:      Lab Results   Component Value Date    NITRU NEGATIVE 03/20/2020    WBCUA 50-75 03/20/2020 WBCUA 11-20 09/30/2019    BACTERIA FEW 03/20/2020    RBCUA 25-50 03/20/2020    BLOODU MODERATE 03/20/2020    SPECGRAV 1.016 03/05/2020    GLUCOSEU NEGATIVE 03/20/2020       Radiology:  US RENAL COMPLETE   Final Result   1. There is mild to moderate left-sided hydronephrosis with left-sided nephrolithiasis measuring up to 1.6 x 1.9 x 1 cm. There are also bladder stones identified. Consider further evaluation with CT for further characterization to evaluate for possible    staghorn calculi. 2. Cholelithiasis is also incidentally demonstrated. **This report has been created using voice recognition software. It may contain minor errors which are inherent in voice recognition technology. **      Final report electronically signed by Dr. Jason Luu on 3/20/2020 10:30 AM          Diet: Diet NPO, After Midnight    DVT prophylaxis: [x] Lovenox                                 [] SCDs                                 [] SQ Heparin                                 [] Encourage ambulation           [] Already on Anticoagulation     Disposition:    [] Home       [] TCU       [] Rehab       [] Psych       [] SNF       [] [facility]       [] Other-    Code Status: Full Code    PT/OT Eval Status: none       Electronically signed by Emmanuel Sauceda DO on 3/21/2020 at 7:50 AM

## 2020-03-21 NOTE — ANESTHESIA POSTPROCEDURE EVALUATION
Department of Anesthesiology  Postprocedure Note    Patient: Lexa Castillo  MRN: [id number]  YOB: 1995  Date of evaluation: 3/21/2020  Time:  3:13 PM     Procedure Summary     Date:  03/21/20 Room / Location:  [facility] / [facility]    Anesthesia Start:  9939 Anesthesia Stop:  6041    Procedure:  CYSTOSCOPY, LEFT URETERAL STENT EXCHANGE (Left ) Diagnosis:  (LEFT URETERAL STONE)    Surgeon:  Shannon Batres MD Responsible Provider:  Raphael Clemente DO    Anesthesia Type:  general ASA Status:  2          Anesthesia Type: No value filed. Abhi Phase I: Abhi Score: 10    Abhi Phase II:      Last vitals: Reviewed and per EMR flowsheets.        Anesthesia Post Evaluation    Patient location during evaluation: PACU  Patient participation: complete - patient participated  Level of consciousness: awake and alert  Pain score: 2  Airway patency: patent  Nausea & Vomiting: no nausea and no vomiting  Complications: no  Cardiovascular status: hemodynamically stable and blood pressure returned to baseline  Respiratory status: spontaneous ventilation, room air and acceptable  Hydration status: stable  Comments: Postoperative FHTs adequate per OB RN Jamal Stack

## 2020-03-22 LAB
ANION GAP SERPL CALCULATED.3IONS-SCNC: 18 MEQ/L (ref 8–16)
BASOPHILS # BLD: 0.1 %
BASOPHILS ABSOLUTE: 0 THOU/MM3 (ref 0–0.1)
BUN BLDV-MCNC: 3 MG/DL (ref 7–22)
CALCIUM SERPL-MCNC: 8.3 MG/DL (ref 8.5–10.5)
CHLORIDE BLD-SCNC: 103 MEQ/L (ref 98–111)
CO2: 15 MEQ/L (ref 23–33)
CREAT SERPL-MCNC: 0.5 MG/DL (ref 0.4–1.2)
EOSINOPHIL # BLD: 0 %
EOSINOPHILS ABSOLUTE: 0 THOU/MM3 (ref 0–0.4)
ERYTHROCYTE [DISTWIDTH] IN BLOOD BY AUTOMATED COUNT: 12.4 % (ref 11.5–14.5)
ERYTHROCYTE [DISTWIDTH] IN BLOOD BY AUTOMATED COUNT: 43.2 FL (ref 35–45)
GFR SERPL CREATININE-BSD FRML MDRD: > 90 ML/MIN/1.73M2
GLUCOSE BLD-MCNC: 78 MG/DL (ref 70–108)
HCT VFR BLD CALC: 32.8 % (ref 37–47)
HEMOGLOBIN: 10.8 GM/DL (ref 12–16)
IMMATURE GRANS (ABS): 0.06 THOU/MM3 (ref 0–0.07)
IMMATURE GRANULOCYTES: 0.5 %
LYMPHOCYTES # BLD: 9.9 %
LYMPHOCYTES ABSOLUTE: 1.2 THOU/MM3 (ref 1–4.8)
MCH RBC QN AUTO: 31.5 PG (ref 26–33)
MCHC RBC AUTO-ENTMCNC: 32.9 GM/DL (ref 32.2–35.5)
MCV RBC AUTO: 95.6 FL (ref 81–99)
MONOCYTES # BLD: 6.1 %
MONOCYTES ABSOLUTE: 0.7 THOU/MM3 (ref 0.4–1.3)
NUCLEATED RED BLOOD CELLS: 0 /100 WBC
PLATELET # BLD: 161 THOU/MM3 (ref 130–400)
PMV BLD AUTO: 10.9 FL (ref 9.4–12.4)
POTASSIUM SERPL-SCNC: 3.5 MEQ/L (ref 3.5–5.2)
RBC # BLD: 3.43 MILL/MM3 (ref 4.2–5.4)
SEG NEUTROPHILS: 83.4 %
SEGMENTED NEUTROPHILS ABSOLUTE COUNT: 9.9 THOU/MM3 (ref 1.8–7.7)
SODIUM BLD-SCNC: 136 MEQ/L (ref 135–145)
WBC # BLD: 11.9 THOU/MM3 (ref 4.8–10.8)

## 2020-03-22 PROCEDURE — 94760 N-INVAS EAR/PLS OXIMETRY 1: CPT

## 2020-03-22 PROCEDURE — 99232 SBSQ HOSP IP/OBS MODERATE 35: CPT | Performed by: FAMILY MEDICINE

## 2020-03-22 PROCEDURE — 36415 COLL VENOUS BLD VENIPUNCTURE: CPT

## 2020-03-22 PROCEDURE — 2580000003 HC RX 258: Performed by: UROLOGY

## 2020-03-22 PROCEDURE — 1200000000 HC SEMI PRIVATE

## 2020-03-22 PROCEDURE — 6360000002 HC RX W HCPCS: Performed by: UROLOGY

## 2020-03-22 PROCEDURE — 85025 COMPLETE CBC W/AUTO DIFF WBC: CPT

## 2020-03-22 PROCEDURE — 6370000000 HC RX 637 (ALT 250 FOR IP): Performed by: UROLOGY

## 2020-03-22 PROCEDURE — 6360000002 HC RX W HCPCS: Performed by: STUDENT IN AN ORGANIZED HEALTH CARE EDUCATION/TRAINING PROGRAM

## 2020-03-22 PROCEDURE — 80048 BASIC METABOLIC PNL TOTAL CA: CPT

## 2020-03-22 PROCEDURE — 2580000003 HC RX 258: Performed by: FAMILY MEDICINE

## 2020-03-22 PROCEDURE — 2709999900 HC NON-CHARGEABLE SUPPLY

## 2020-03-22 PROCEDURE — 6370000000 HC RX 637 (ALT 250 FOR IP): Performed by: FAMILY MEDICINE

## 2020-03-22 PROCEDURE — 2580000003 HC RX 258: Performed by: OBSTETRICS & GYNECOLOGY

## 2020-03-22 PROCEDURE — 6360000002 HC RX W HCPCS: Performed by: OBSTETRICS & GYNECOLOGY

## 2020-03-22 RX ORDER — OXYCODONE HYDROCHLORIDE AND ACETAMINOPHEN 5; 325 MG/1; MG/1
1 TABLET ORAL EVERY 4 HOURS PRN
Status: DISCONTINUED | OUTPATIENT
Start: 2020-03-22 | End: 2020-03-24 | Stop reason: HOSPADM

## 2020-03-22 RX ORDER — BISACODYL 10 MG
10 SUPPOSITORY, RECTAL RECTAL DAILY PRN
Status: DISCONTINUED | OUTPATIENT
Start: 2020-03-22 | End: 2020-03-24 | Stop reason: HOSPADM

## 2020-03-22 RX ORDER — POLYETHYLENE GLYCOL 3350 17 G/17G
17 POWDER, FOR SOLUTION ORAL 2 TIMES DAILY PRN
Status: DISCONTINUED | OUTPATIENT
Start: 2020-03-22 | End: 2020-03-24 | Stop reason: HOSPADM

## 2020-03-22 RX ORDER — OXYCODONE HYDROCHLORIDE AND ACETAMINOPHEN 5; 325 MG/1; MG/1
2 TABLET ORAL EVERY 4 HOURS PRN
Status: DISCONTINUED | OUTPATIENT
Start: 2020-03-22 | End: 2020-03-24 | Stop reason: HOSPADM

## 2020-03-22 RX ORDER — CIPROFLOXACIN 500 MG/1
500 TABLET, FILM COATED ORAL EVERY 12 HOURS SCHEDULED
Status: DISCONTINUED | OUTPATIENT
Start: 2020-03-22 | End: 2020-03-22

## 2020-03-22 RX ADMIN — OXYCODONE HYDROCHLORIDE AND ACETAMINOPHEN 2 TABLET: 5; 325 TABLET ORAL at 17:15

## 2020-03-22 RX ADMIN — OXYCODONE HYDROCHLORIDE AND ACETAMINOPHEN 1 TABLET: 5; 325 TABLET ORAL at 05:24

## 2020-03-22 RX ADMIN — DOCUSATE SODIUM 100 MG: 100 CAPSULE, LIQUID FILLED ORAL at 19:42

## 2020-03-22 RX ADMIN — ONDANSETRON 4 MG: 2 INJECTION INTRAMUSCULAR; INTRAVENOUS at 05:24

## 2020-03-22 RX ADMIN — SODIUM CHLORIDE: 9 INJECTION, SOLUTION INTRAVENOUS at 10:46

## 2020-03-22 RX ADMIN — SODIUM CHLORIDE, PRESERVATIVE FREE 10 ML: 5 INJECTION INTRAVENOUS at 09:01

## 2020-03-22 RX ADMIN — OXYCODONE HYDROCHLORIDE AND ACETAMINOPHEN 2 TABLET: 5; 325 TABLET ORAL at 21:25

## 2020-03-22 RX ADMIN — ENOXAPARIN SODIUM 40 MG: 40 INJECTION SUBCUTANEOUS at 17:21

## 2020-03-22 RX ADMIN — SODIUM CHLORIDE: 9 INJECTION, SOLUTION INTRAVENOUS at 00:53

## 2020-03-22 RX ADMIN — MORPHINE SULFATE 2 MG: 2 INJECTION, SOLUTION INTRAMUSCULAR; INTRAVENOUS at 04:06

## 2020-03-22 RX ADMIN — DOCUSATE SODIUM 100 MG: 100 CAPSULE, LIQUID FILLED ORAL at 09:04

## 2020-03-22 RX ADMIN — ONDANSETRON 4 MG: 2 INJECTION INTRAMUSCULAR; INTRAVENOUS at 21:26

## 2020-03-22 RX ADMIN — HYDROMORPHONE HYDROCHLORIDE 0.5 MG: 1 INJECTION, SOLUTION INTRAMUSCULAR; INTRAVENOUS; SUBCUTANEOUS at 11:07

## 2020-03-22 RX ADMIN — OXYCODONE HYDROCHLORIDE AND ACETAMINOPHEN 2 TABLET: 5; 325 TABLET ORAL at 12:20

## 2020-03-22 RX ADMIN — ONDANSETRON 4 MG: 2 INJECTION INTRAMUSCULAR; INTRAVENOUS at 12:23

## 2020-03-22 RX ADMIN — NITROFURANTOIN MONOHYDRATE/MACROCRYSTALLINE 100 MG: 25; 75 CAPSULE ORAL at 09:01

## 2020-03-22 RX ADMIN — VANCOMYCIN HYDROCHLORIDE 1000 MG: 1 INJECTION, POWDER, LYOPHILIZED, FOR SOLUTION INTRAVENOUS at 17:16

## 2020-03-22 ASSESSMENT — PAIN DESCRIPTION - ORIENTATION
ORIENTATION: LEFT
ORIENTATION: LEFT

## 2020-03-22 ASSESSMENT — PAIN DESCRIPTION - LOCATION
LOCATION: FLANK
LOCATION: FLANK

## 2020-03-22 ASSESSMENT — PAIN SCALES - GENERAL
PAINLEVEL_OUTOF10: 9
PAINLEVEL_OUTOF10: 8
PAINLEVEL_OUTOF10: 9
PAINLEVEL_OUTOF10: 2
PAINLEVEL_OUTOF10: 9
PAINLEVEL_OUTOF10: 5
PAINLEVEL_OUTOF10: 10
PAINLEVEL_OUTOF10: 8

## 2020-03-22 ASSESSMENT — PAIN DESCRIPTION - PAIN TYPE
TYPE: SURGICAL PAIN
TYPE: SURGICAL PAIN

## 2020-03-22 NOTE — PROGRESS NOTES
Pharmacy Note  Vancomycin Consult    Nereida Martinez is a 27 y.o. female started on Vancomycin for UTI; consult received from Dr. Indra Perry to manage therapy. Also receiving the following antibiotics: none. Patient Active Problem List   Diagnosis    Renal colic    Oligohydramnios    Pyelonephritis, acute    Urinary tract obstruction by kidney stone    13 weeks gestation of pregnancy    Left lower quadrant abdominal pain    Hydronephrosis of left kidney    Left ureteral stone    Nausea and vomiting    Leukocytosis    Normocytic anemia    Hypokalemia    Metabolic acidosis    Marijuana abuse    Kidney stone    Nephrolithiasis    Acute cystitis without hematuria    Pregnancy with 29 completed weeks gestation       Allergies:  Patient has no known allergies. Temp max: afebrile    Recent Labs     03/20/20  0914 03/22/20  0757   BUN 8 3*       Recent Labs     03/20/20  0914 03/22/20  0757   CREATININE 0.5 0.5       Recent Labs     03/20/20  0914 03/22/20  0555   WBC 10.3 11.9*         Intake/Output Summary (Last 24 hours) at 3/22/2020 1620  Last data filed at 3/22/2020 1405  Gross per 24 hour   Intake 4137.14 ml   Output 0 ml   Net 4137.14 ml     Culture Date Source Results   3/20/2020 urine Methicillin-resistant staphylococcus hominis   3/20/2020 urine Mixed growth       Ht Readings from Last 1 Encounters:   03/20/20 5' 1\" (1.549 m)        Wt Readings from Last 1 Encounters:   03/20/20 153 lb (69.4 kg)         Body mass index is 28.91 kg/m². Estimated Creatinine Clearance: 157 mL/min (based on SCr of 0.5 mg/dL). Goal Trough Level: 10-15 mcg/mL    Assessment/Plan:  Will initiate vancomycin 1000 mg IV every 12 hours. Timing of trough level will be determined based on culture results, renal function, and clinical response. Thank you for the consult. Will continue to follow.   Rene Rousseau, JovaniD, BCPS  3/22/2020  4:23 PM

## 2020-03-22 NOTE — PROGRESS NOTES
Hospitalist Progress Note    Patient:  Nelly Borja      Unit/Bed:5K-002-A    YOB: 1995    MRN: [id number]       Acct: [de-identified]     PCP: Sherice Rand MD    Date of Admission: 3/20/2020    Assessment/Plan:    Anticipated Discharge in :     [facility] Problems    Diagnosis Date Noted    Acute cystitis without hematuria [N30.00]     Pregnancy with 29 completed weeks gestation [Z3A.29]     Nephrolithiasis [N20.0] 2020     Left sided nephrolithiasis with hydronephrosis   Nephrolithiasis 1.6x1. 9x1 cm on renal US  Urology consulted  Cystoscopy with left ureteral stent exchange done on 3/21 by Dr. Ruben Crabtree complaining of severe pain postoperatively, started on Dilaudid and morphine as needed  Patient states that morphine is not helping at all, will discontinue  Will increase Percocet 5/325 mg/tab to 2 tabs for severe pain and 1 tab for moderate pain  Will consider discharge today if pain is controlled with p.o. and okay with urology     Bladder stones   Seen on renal US   Urology following; management per urology      Acute cystitis with hematuria, POA, no sepsis  UA mod blood, 100 protein, mod leukocyte esterase  Urine culture demonstrates mixed growth, awaiting for final culture  Nitrofurantoin PO started 3/20/20   Patient remains afebrile, although with mild leukocytosis postoperatively, likely reactive     , 29 weeks 4 days gestation  LAMIN 6/3/2020; Obgyn Dr. Yunior Ramsey  Pregnancy cx by nephrolithiasis s/p stents q5-6 weeks and failed 1 hr GTT. Monitoring blood sugars at home, not on meds.   Contacted Dr. Yunior Ramsey so she is aware that she is here, follow-up as outpatient  FHT q shift     Benign proteinuria  UA had protein;  BP 93/58; will monitor for signs/symptoms of preeclampsia but most likely this is due to nephrolithiasis and increased cellularity in the urine  LFTs normal, Cr 0.5, platelets 037   Urine protein 61 mg and  spot protein creatinine ratio is 3.09, uric content appropriate, normal insight  Capillary Refill: Brisk,< 3 seconds   Peripheral Pulses: +2 palpable, equal bilaterally       Labs:   Recent Labs     03/20/20  0914 03/22/20  0555   WBC 10.3 11.9*   HGB 11.4* 10.8*   HCT 34.6* 32.8*    161     Recent Labs     03/20/20  0914 03/22/20  0757    136   K 4.1 3.5    103   CO2 19* 15*   BUN 8 3*   CREATININE 0.5 0.5   CALCIUM 8.4* 8.3*     Recent Labs     03/20/20  0914   AST 13   ALT 6*   BILIDIR <0.2   BILITOT <0.2*   ALKPHOS 48     No results for input(s): INR in the last 72 hours. No results for input(s): Murleen Iba in the last 72 hours. Urinalysis:      Lab Results   Component Value Date    NITRU NEGATIVE 03/20/2020    WBCUA 50-75 03/20/2020    WBCUA 11-20 09/30/2019    BACTERIA FEW 03/20/2020    RBCUA 25-50 03/20/2020    BLOODU MODERATE 03/20/2020    SPECGRAV 1.016 03/05/2020    GLUCOSEU NEGATIVE 03/20/2020       Radiology:  US RENAL COMPLETE   Final Result   1. There is mild to moderate left-sided hydronephrosis with left-sided nephrolithiasis measuring up to 1.6 x 1.9 x 1 cm. There are also bladder stones identified. Consider further evaluation with CT for further characterization to evaluate for possible    staghorn calculi. 2. Cholelithiasis is also incidentally demonstrated. **This report has been created using voice recognition software. It may contain minor errors which are inherent in voice recognition technology. **      Final report electronically signed by Dr. Rafy Ruiz on 3/20/2020 10:30 AM          Diet: DIET GENERAL;    DVT prophylaxis: [] Lovenox                                 [] SCDs                                 [] SQ Heparin                                 [] Encourage ambulation           [] Already on Anticoagulation     Disposition:    [] Home       [] TCU       [] Rehab       [] Psych       [] SNF       [] [facility]       [] Other-    Code Status: Full Code    PT/OT Eval Status:

## 2020-03-22 NOTE — PLAN OF CARE
Reviewed plan of care with patient. Discussed goals of care for this shift. Personal belongings are within reach. Reviewed importance of using call light for assistance when ambulating. Bed is in lowest position, and alarm is activated. Call light is within reach. Will continue to monitor.      Audelia DAWNN, RN

## 2020-03-22 NOTE — CONSULTS
further characterization to evaluate for possible    staghorn calculi.       2. Cholelithiasis is also incidentally demonstrated. ASSESSMENT AND PLAN:      Active Problems:    1. Nephrolithiasis   Plan: Management per Urology    2. Acute cystitis without hematuria   Plan: Management per Urology    3. Pregnancy with 29 completed weeks gestation   Plan: Daily NST. Called and discussed the patient with Nursing. Unaware of an actual consult until  logging into the system this morning and seeing her on our list.  She was not on our list yesterday. Management per OB would not have changed with a face to face consult, so recommendations done via  Phone. 4. Constipation   Secondary to narcotic use. Will adjust stool softners.

## 2020-03-22 NOTE — PROGRESS NOTES
Pt's urine with S. hominis only sensitive to Cipro and Gentamicin. Result reviewed by Sourav Bryant RN with [facility] physician on call Dr. Chelsey Batista and ok'd for cipro. Macrobid stopped and Cipro started. ADDENDUM 3/22/2020 4:18 PM  Notified by pharmacy they further discussed with OB and plan to start pt on Vancomycin instead and run sensitivities tomorrow. Urology ok with that plan. Further antibiotic recs per OB.

## 2020-03-22 NOTE — PROGRESS NOTES
Antonina Stanton MD  Urology Progress Note    Subjective: Clementine Morales is a 27 y.o. female. Left flank pain    His/Her current Diet is: DIET GENERAL;.    Since the previous note, the patient reports the following:  No acute issues overnight. No fevers or chills. No nausea or vomiting. Flank pain persistent      Vitals and Labs:  Patient Vitals for the past 24 hrs:   BP Temp Temp src Pulse Resp SpO2   03/21/20 1945 110/75 98.1 °F (36.7 °C) Oral 74 16 98 %   03/21/20 1630 120/81 97.8 °F (36.6 °C) Oral 84 18 96 %   03/21/20 1600 120/69 98.1 °F (36.7 °C) Oral 71 18 95 %   03/21/20 1522 118/79 98.5 °F (36.9 °C) Oral 74 18 98 %   03/21/20 1500 120/71 97.7 °F (36.5 °C) Oral 81 18 98 %   03/21/20 1427 108/66 -- -- 78 18 96 %   03/21/20 1425 111/69 -- -- 79 17 95 %   03/21/20 1420 116/64 -- -- 90 14 97 %   03/21/20 1415 112/61 -- -- 84 11 97 %   03/21/20 1410 119/73 -- -- 88 18 97 %   03/21/20 1405 118/70 -- -- 91 19 98 %   03/21/20 1400 119/70 -- -- 88 14 96 %   03/21/20 1355 123/69 -- -- 87 16 98 %   03/21/20 1349 121/71 97.2 °F (36.2 °C) Temporal 88 15 96 %   03/21/20 0831 (!) 93/58 97.9 °F (36.6 °C) Oral 69 16 96 %   03/21/20 0455 (!) 83/48 98.3 °F (36.8 °C) Oral 66 16 98 %   03/21/20 0228 -- -- -- -- 16 98 %   03/20/20 2133 (!) 100/50 97.8 °F (36.6 °C) Axillary 70 (!) 74 98 %     I/O last 3 completed shifts: In: 1080 [P.O.:200; I.V.:880]  Out: -     Recent Labs     03/20/20  0914   WBC 10.3   HGB 11.4*   HCT 34.6*   MCV 95.1        Recent Labs     03/20/20  0914      K 4.1      CO2 19*   BUN 8   CREATININE 0.5       Recent Labs     03/20/20  1022   COLORU YELLOW   PHUR 6.5   WBCUA 50-75   RBCUA 25-50   YEAST NONE SEEN   BACTERIA FEW   LEUKOCYTESUR MODERATE*   UROBILINOGEN 1.0   BILIRUBINUR NEGATIVE   BLOODU MODERATE*       Physical Exam:  No acute distress. Awake, alert and oriented. Neck is supple  Regular rate and rhythm. Normal peripheral pulses  No accessory muscles of inspiration. Symmetric chest rise  Abdomen soft, non-tender, non-distended. No CVA tenderness. No calf pain. Minimal/no edema in bilateral lower extremities. Skin is warm, dry  Psych: mood, affect normal    Additional Lab/Culture results:     Imaging Reviewed:     Roverto Robles MD independently reviewed the images and verified the radiology reports from:    Us Renal Complete    Result Date: 3/20/2020  PROCEDURE: US RENAL COMPLETE CLINICAL INFORMATION: Left flank pain COMPARISON: 3/5/2020 TECHNIQUE:  Transverse and longitudinal ultrasound images were obtained through the kidneys. Grayscale and color flow ultrasound images were performed. Resistive indices were calculated. FINDINGS: Echogenic foci demonstrated within the gallbladder lumen consistent with cholelithiasis. The right kidney demonstrates a normal resistive index. There is no hydronephrosis within the right kidney. There is mild to moderate left-sided hydronephrosis. Layering echogenic areas are demonstrated within the dependent portions of several calyces. This may represent debris versus stones. The largest of these measures 1.9 x 1.6 x 1 cm. This demonstrates posterior shadowing likely represents a renal calculus. The urinary bladder demonstrates echogenic foci with posterior shadowing measuring up to 9 mm in diameter. This also likely represents a bladder stone. The ureteral jets are not identified at this time. RIGHT KIDNEY - 10.78 x 4.67 x 4.62 cm Resistive Index - 0.65 Cortical Thickness - 0.77 cm LEFT KIDNEY - 12.16 x 6.53 x 6.83 cm Resistive Index - 0.77 Cortical Thickness - 1.78 cm URINARY BLADDER Pre-Void - 63 mL Post-Void - / mL     1. There is mild to moderate left-sided hydronephrosis with left-sided nephrolithiasis measuring up to 1.6 x 1.9 x 1 cm. There are also bladder stones identified. Consider further evaluation with CT for further characterization to evaluate for possible staghorn calculi. 2. Cholelithiasis is also incidentally demonstrated.

## 2020-03-23 LAB
ANION GAP SERPL CALCULATED.3IONS-SCNC: 11 MEQ/L (ref 8–16)
BUN BLDV-MCNC: < 2 MG/DL (ref 7–22)
CALCIUM SERPL-MCNC: 8.1 MG/DL (ref 8.5–10.5)
CHLORIDE BLD-SCNC: 105 MEQ/L (ref 98–111)
CO2: 20 MEQ/L (ref 23–33)
CREAT SERPL-MCNC: 0.5 MG/DL (ref 0.4–1.2)
ERYTHROCYTE [DISTWIDTH] IN BLOOD BY AUTOMATED COUNT: 12.4 % (ref 11.5–14.5)
ERYTHROCYTE [DISTWIDTH] IN BLOOD BY AUTOMATED COUNT: 42.5 FL (ref 35–45)
GFR SERPL CREATININE-BSD FRML MDRD: > 90 ML/MIN/1.73M2
GLUCOSE BLD-MCNC: 98 MG/DL (ref 70–108)
HCT VFR BLD CALC: 29.7 % (ref 37–47)
HEMOGLOBIN: 9.5 GM/DL (ref 12–16)
LACTIC ACID: 0.9 MMOL/L (ref 0.5–2.2)
MCH RBC QN AUTO: 30.3 PG (ref 26–33)
MCHC RBC AUTO-ENTMCNC: 32 GM/DL (ref 32.2–35.5)
MCV RBC AUTO: 94.6 FL (ref 81–99)
ORGANISM: ABNORMAL
PLATELET # BLD: 156 THOU/MM3 (ref 130–400)
PMV BLD AUTO: 10.8 FL (ref 9.4–12.4)
POTASSIUM SERPL-SCNC: 3 MEQ/L (ref 3.5–5.2)
RBC # BLD: 3.14 MILL/MM3 (ref 4.2–5.4)
SODIUM BLD-SCNC: 136 MEQ/L (ref 135–145)
URINE CULTURE, ROUTINE: ABNORMAL
URINE CULTURE, ROUTINE: ABNORMAL
WBC # BLD: 8.1 THOU/MM3 (ref 4.8–10.8)

## 2020-03-23 PROCEDURE — 6370000000 HC RX 637 (ALT 250 FOR IP): Performed by: STUDENT IN AN ORGANIZED HEALTH CARE EDUCATION/TRAINING PROGRAM

## 2020-03-23 PROCEDURE — 6360000002 HC RX W HCPCS: Performed by: STUDENT IN AN ORGANIZED HEALTH CARE EDUCATION/TRAINING PROGRAM

## 2020-03-23 PROCEDURE — 6370000000 HC RX 637 (ALT 250 FOR IP): Performed by: FAMILY MEDICINE

## 2020-03-23 PROCEDURE — 2580000003 HC RX 258: Performed by: OBSTETRICS & GYNECOLOGY

## 2020-03-23 PROCEDURE — 83605 ASSAY OF LACTIC ACID: CPT

## 2020-03-23 PROCEDURE — 2709999900 HC NON-CHARGEABLE SUPPLY

## 2020-03-23 PROCEDURE — 80048 BASIC METABOLIC PNL TOTAL CA: CPT

## 2020-03-23 PROCEDURE — 99232 SBSQ HOSP IP/OBS MODERATE 35: CPT | Performed by: FAMILY MEDICINE

## 2020-03-23 PROCEDURE — 94760 N-INVAS EAR/PLS OXIMETRY 1: CPT

## 2020-03-23 PROCEDURE — 36415 COLL VENOUS BLD VENIPUNCTURE: CPT

## 2020-03-23 PROCEDURE — 6360000002 HC RX W HCPCS: Performed by: OBSTETRICS & GYNECOLOGY

## 2020-03-23 PROCEDURE — 2580000003 HC RX 258: Performed by: FAMILY MEDICINE

## 2020-03-23 PROCEDURE — 99232 SBSQ HOSP IP/OBS MODERATE 35: CPT | Performed by: NURSE PRACTITIONER

## 2020-03-23 PROCEDURE — 6360000002 HC RX W HCPCS: Performed by: UROLOGY

## 2020-03-23 PROCEDURE — 6370000000 HC RX 637 (ALT 250 FOR IP): Performed by: UROLOGY

## 2020-03-23 PROCEDURE — 1200000003 HC TELEMETRY R&B

## 2020-03-23 PROCEDURE — 85027 COMPLETE CBC AUTOMATED: CPT

## 2020-03-23 RX ORDER — ACETAMINOPHEN 650 MG/1
650 SUPPOSITORY RECTAL 4 TIMES DAILY
Status: DISCONTINUED | OUTPATIENT
Start: 2020-03-23 | End: 2020-03-24 | Stop reason: HOSPADM

## 2020-03-23 RX ORDER — POTASSIUM CHLORIDE 20 MEQ/1
40 TABLET, EXTENDED RELEASE ORAL PRN
Status: DISCONTINUED | OUTPATIENT
Start: 2020-03-23 | End: 2020-03-24 | Stop reason: HOSPADM

## 2020-03-23 RX ORDER — ACETAMINOPHEN 325 MG/1
650 TABLET ORAL 4 TIMES DAILY
Status: DISCONTINUED | OUTPATIENT
Start: 2020-03-23 | End: 2020-03-24 | Stop reason: HOSPADM

## 2020-03-23 RX ORDER — POTASSIUM CHLORIDE 7.45 MG/ML
10 INJECTION INTRAVENOUS PRN
Status: DISCONTINUED | OUTPATIENT
Start: 2020-03-23 | End: 2020-03-24 | Stop reason: HOSPADM

## 2020-03-23 RX ADMIN — POTASSIUM CHLORIDE 10 MEQ: 7.46 INJECTION, SOLUTION INTRAVENOUS at 12:12

## 2020-03-23 RX ADMIN — POTASSIUM CHLORIDE 10 MEQ: 7.46 INJECTION, SOLUTION INTRAVENOUS at 10:31

## 2020-03-23 RX ADMIN — DOCUSATE SODIUM 100 MG: 100 CAPSULE, LIQUID FILLED ORAL at 20:25

## 2020-03-23 RX ADMIN — POTASSIUM CHLORIDE 10 MEQ: 7.46 INJECTION, SOLUTION INTRAVENOUS at 18:45

## 2020-03-23 RX ADMIN — ENOXAPARIN SODIUM 40 MG: 40 INJECTION SUBCUTANEOUS at 16:25

## 2020-03-23 RX ADMIN — VANCOMYCIN HYDROCHLORIDE 1000 MG: 1 INJECTION, POWDER, LYOPHILIZED, FOR SOLUTION INTRAVENOUS at 04:27

## 2020-03-23 RX ADMIN — POTASSIUM CHLORIDE 10 MEQ: 7.46 INJECTION, SOLUTION INTRAVENOUS at 21:24

## 2020-03-23 RX ADMIN — OXYCODONE HYDROCHLORIDE AND ACETAMINOPHEN 2 TABLET: 5; 325 TABLET ORAL at 20:25

## 2020-03-23 RX ADMIN — OXYCODONE HYDROCHLORIDE AND ACETAMINOPHEN 2 TABLET: 5; 325 TABLET ORAL at 12:46

## 2020-03-23 RX ADMIN — OXYCODONE HYDROCHLORIDE AND ACETAMINOPHEN 2 TABLET: 5; 325 TABLET ORAL at 06:36

## 2020-03-23 RX ADMIN — VANCOMYCIN HYDROCHLORIDE 1000 MG: 1 INJECTION, POWDER, LYOPHILIZED, FOR SOLUTION INTRAVENOUS at 17:23

## 2020-03-23 RX ADMIN — OXYBUTYNIN CHLORIDE 5 MG: 5 TABLET ORAL at 17:27

## 2020-03-23 RX ADMIN — ONDANSETRON 4 MG: 2 INJECTION INTRAMUSCULAR; INTRAVENOUS at 06:36

## 2020-03-23 RX ADMIN — POTASSIUM CHLORIDE 10 MEQ: 7.46 INJECTION, SOLUTION INTRAVENOUS at 14:25

## 2020-03-23 RX ADMIN — ONDANSETRON 4 MG: 2 INJECTION INTRAMUSCULAR; INTRAVENOUS at 12:46

## 2020-03-23 RX ADMIN — ACETAMINOPHEN 650 MG: 325 TABLET ORAL at 16:25

## 2020-03-23 RX ADMIN — SODIUM CHLORIDE: 9 INJECTION, SOLUTION INTRAVENOUS at 02:28

## 2020-03-23 RX ADMIN — POTASSIUM CHLORIDE 10 MEQ: 7.46 INJECTION, SOLUTION INTRAVENOUS at 16:26

## 2020-03-23 RX ADMIN — DOCUSATE SODIUM 100 MG: 100 CAPSULE, LIQUID FILLED ORAL at 08:18

## 2020-03-23 RX ADMIN — SODIUM CHLORIDE: 9 INJECTION, SOLUTION INTRAVENOUS at 20:26

## 2020-03-23 ASSESSMENT — PAIN SCALES - GENERAL
PAINLEVEL_OUTOF10: 9
PAINLEVEL_OUTOF10: 7
PAINLEVEL_OUTOF10: 0
PAINLEVEL_OUTOF10: 3
PAINLEVEL_OUTOF10: 4
PAINLEVEL_OUTOF10: 7
PAINLEVEL_OUTOF10: 7
PAINLEVEL_OUTOF10: 9
PAINLEVEL_OUTOF10: 6
PAINLEVEL_OUTOF10: 2

## 2020-03-23 NOTE — CARE COORDINATION
3/23/20, 12:23 PM EDT  DISCHARGE PLANNING EVALUATION:    Faheem Sierra       Admitted from: ER, patient presented with left flank pain. 3/20/2020/ [address] day: 3   Location: -02/002-A Reason for admit: Nephrolithiasis [N20.0] Status: Inpt. Admit order signed?: yes  PMH:  has a past medical history of Kidney stone and PONV (postoperative nausea and vomiting). Procedure: 3/21/2020 CYSTOSCOPY, LEFT URETERAL STENT EXCHANGE (Left )  Pertinent abnormal Imaging:Renal ultrasound:   1. There is mild to moderate left-sided hydronephrosis with left-sided nephrolithiasis measuring up to 1.6 x 1.9 x 1 cm. There are also bladder stones identified. Consider further evaluation with CT for further characterization to evaluate for possible    staghorn calculi.       2. Cholelithiasis is also incidentally demonstrated. Medications:  Scheduled Meds:   vancomycin (VANCOCIN) intermittent dosing (placeholder)   Other RX Placeholder    vancomycin  1,000 mg Intravenous Q12H    fentanNYL  25 mcg Intravenous Once    sodium chloride flush  10 mL Intravenous 2 times per day    enoxaparin  40 mg Subcutaneous Q24H    docusate sodium  100 mg Oral BID     Continuous Infusions:   sodium chloride 75 mL/hr at 03/23/20 0228      Pertinent Info/Orders/Treatment Plan:  Urology consult, IV fluids, IV Vancomycin per pharmacy dosing, Lovenox, prn Tylenol, Dulcolax, Percocet, Zofran, Glycolax,Potassium replacement protocol, daily fetal heart tones, telemetry, up with assistance. Diet: DIET GENERAL;   Smoking status:  reports that she quit smoking about 3 months ago. Her smoking use included cigarettes.  She has never used smokeless tobacco.   PCP: Carmen Sprague MD  Readmission 30 days or less: No  Readmission Risk Score: 36%    Discharge Planning Evaluation  Current Residence:  Private Residence  Living Arrangements:  Parent   Support Systems:  Parent  Current Services PTA:     Potential Assistance Needed:  N/A  Potential Assistance Purchasing Medications:  No  Does patient want to participate in local refill/ meds to beds program?  No  Type of Home Care Services:  None  Patient expects to be discharged to:  home  Expected Discharge date:  03/23/20  Follow Up Appointment: Best Day/ Time: Monday AM    Patient Goals/Plan/Treatment Preferences: Kelli Eldridge is from home with her . She has insurance, a PCP, can afford her medications, will have transportation to home at discharge and her ADL's and nutritional needs are met. Transportation/Food Security/Housekeeping Addressed:  No issues identified.     Evaluation: no

## 2020-03-23 NOTE — PROGRESS NOTES
PROGRESS NOTE      Patient:  Areli Prince      Unit/Bed:5K-02/002-A    YOB: 1995    MRN: [id number]       Acct: [de-identified]     PCP: Saleem Mitchell MD    Date of Admission: 3/20/2020      Assessment/Plan:    Anticipated Discharge in: as soon as can tolerate PO and pain is controlled     Active Hospital Problems    Diagnosis Date Noted    Acute cystitis without hematuria [N30.00]     Pregnancy with 29 completed weeks gestation [Z3A.29]     Nephrolithiasis [N20.0] 03/20/2020     Left sided nephrolithiasis with hydronephrosis   Nephrolithiasis 1.6x1. 9x1 cm on renal US  Urology following   Cystoscopy with left ureteral stent exchange done on 3/21 by Dr. Brennan Justice complaining of severe pain postoperatively, started on Dilaudid and morphine as needed  Patient states that morphine is not helping at all, will discontinue  Will increase Percocet 5/325 mg/tab to 2 tabs for severe pain and 1 tab for moderate pain  Will consider discharge today if pain is controlled with p.o., tolerating regular diet, and okay with urology     Bladder stones   Seen on renal US   Urology following; management per urology      Acute cystitis with hematuria, POA, no sepsis  UA mod blood, 100 protein, mod leukocyte esterase  Urine culture demonstrates Staph hominis   Nitrofurantoin PO started 3/20/20 > vancomycin started by OBGYN > will transition to PO cipro on discharge  Patient remains afebrile, mild leukocytosis resolved 3/23     [id number], 40 weeks 5 days gestation  LAMIN 6/3/2020; Obgyn Dr. Chelsey Batista  Pregnancy cx by nephrolithiasis s/p stents q5-6 weeks and failed 1 hr GTT. Monitoring blood sugars at home, not on meds.   Contacted Dr. Chelsey Batista so she is aware that she is here, follow-up as outpatient  FHT q shift and daily NST      Benign proteinuria  UA had protein;  BP 93/58; will monitor for signs/symptoms of preeclampsia but most likely this is due to nephrolithiasis and increased cellularity in the urine  LFTs normal, Cr 0.5, platelets 752   Urine protein 61 mg and  spot protein creatinine ratio is 3.09, uric acid WNL  This is unlikely preeclampsia     Hypocalcemia 2/2 Hypoalbuminemia due to Pregnancy  Corrected Ca 9.0, spot protein creatinine ratio is 3.09  Albumin 3.3, most likely related to pregnancy  Will continue to monitor      Normocytic Anemia, likely related to hemodilution and pregnancy   Hgb 11.4 on admission > 10.8 > 9.5  Likely due to anemia of pregnancy and accentuated by hemodilution from IV fluids  Will start on iron supplementation   Continue monitoring    Homocysteinemia   Homozygous for MTHFR 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 Marcy Myers presents to the ED for evaluation of left flank pain for the last three days. Pt reports hx renal stones, since the age of 8, with first ureteral stent placed Dec 2019. Pt states she has required stent replacements since then with exchanges every 5-6 weeks. She follows with Urology, Dr. Dennis Frances rates left flank pain 7/10 at this time, associated with urinary frequency. She has had decreased appetite, nausea, and vomiting. Denies fevers that she's measured but reports hot flashes, denies chills, hematuria, dysuria. No alleviating/aggravating factors. Per chart review, pt last seen by Urology 3/9. According to the note, the plan was to continue with 5 week stent exchanges and planning for ureteroscopy/lithrotripsy after delivery. Pt admitted to hospitalist service, with urology consult, for further evaluation and care.     Subjective:   Patient seen and examined, she is laying comfortably in bed.   Patient had cystoscopy Already on Anticoagulation     Disposition:    [] Home       [] TCU       [] Rehab       [] Psych       [] SNF       [] [facility]       [] Other-    Code Status: Full Code    PT/OT Eval Status: none       Electronically signed by Alexi Farias DO on 3/23/2020 at 10:28 AM

## 2020-03-23 NOTE — PLAN OF CARE
Problem: Pain:  Description: Pain management should include both nonpharmacologic and pharmacologic interventions. Goal: Pain level will decrease  Description: Pain level will decrease  Outcome: Ongoing   Patient states pain relief from PRN pain medications. Pain reassessed one hour post PRN pain medication given. Patient's stated pain goal 4. Rest and repositioning utilized for comfort. Problem: Infection:  Goal: Will remain free from infection  Description: Will remain free from infection  Outcome: Ongoing   Patient remains afebrile and continuing to monitor labs. Problem: Safety:  Goal: Free from accidental physical injury  Description: Free from accidental physical injury  Outcome: Ongoing   Fall assessment completed. Patient using call light appropriately to call for assistance with ambulation to bathroom. Personal items within reach. Patient is also compliant with use of non-skid slippers. Problem: Daily Care:  Goal: Daily care needs are met  Description: Daily care needs are met  Outcome: Ongoing   Patient remains independent with daily cares. Problem: Skin Integrity:  Goal: Skin integrity will stabilize  Description: Skin integrity will stabilize  Outcome: Ongoing   No skin breakdown this shift. Patient turns independently. Patients states understanding of repositioning every two hours. Problem: Discharge Planning:  Goal: Patients continuum of care needs are met  Description: Patients continuum of care needs are met  Outcome: Ongoing  Discharge plan is home with family. Care plan reviewed with patient and family. Patient and family verbalize understanding of the plan of care and contribute to goal setting.

## 2020-03-23 NOTE — PROGRESS NOTES
Message left on patient father's voicemail [phone] that I was calling with update on daughter and call back number.

## 2020-03-23 NOTE — PROGRESS NOTES
Urology Progress Note    Chief Complaint: left flank pain    Subjective:     Patient is resting in bed, voiding spontaneously, +flatus, -BM, ambulating with assistance, tolerating regular diet, denies any nausea or vomiting. There are complaints of moderate pain at this time. She reports pain shoots up to a 10/10 when percocet wears off.              Vitals:  BP (!) 91/53   Pulse 88   Temp 98.1 °F (36.7 °C) (Oral)   Resp 16   Ht 5' 1\" (1.549 m)   Wt 153 lb (69.4 kg)   LMP 2019 Comment: 26 WEEKS PREGNANT  SpO2 95% Comment: o2 not needed per o2 protocol  BMI 28.91 kg/m²   Temp  Av °F (36.7 °C)  Min: 97.5 °F (36.4 °C)  Max: 98.2 °F (36.8 °C)    Intake/Output Summary (Last 24 hours) at 3/23/2020 1246  Last data filed at 3/23/2020 0073  Gross per 24 hour   Intake 3389.14 ml   Output --   Net 3389.14 ml       Social History     Socioeconomic History    Marital status: Single     Spouse name: Not on file    Number of children: Not on file    Years of education: Not on file    Highest education level: Not on file   Occupational History    Not on file   Social Needs    Financial resource strain: Not on file    Food insecurity     Worry: Not on file     Inability: Not on file    Transportation needs     Medical: Not on file     Non-medical: Not on file   Tobacco Use    Smoking status: Former Smoker     Types: Cigarettes     Last attempt to quit: 2019     Years since quittin.3    Smokeless tobacco: Never Used    Tobacco comment: Vape   Substance and Sexual Activity    Alcohol use: No    Drug use: Yes     Types: Marijuana     Comment: LAST USED 2020    Sexual activity: Yes     Partners: Male   Lifestyle    Physical activity     Days per week: Not on file     Minutes per session: Not on file    Stress: Not on file   Relationships    Social connections     Talks on phone: Not on file     Gets together: Not on file     Attends [employer] service: Not on file     Active member of club

## 2020-03-23 NOTE — FLOWSHEET NOTE
fht's with doppler at 140 bpm just below umbilicus, has been feeling baby move, denies cramping and fernanda

## 2020-03-24 VITALS
SYSTOLIC BLOOD PRESSURE: 96 MMHG | HEART RATE: 78 BPM | DIASTOLIC BLOOD PRESSURE: 59 MMHG | OXYGEN SATURATION: 98 % | TEMPERATURE: 98.1 F | HEIGHT: 61 IN | BODY MASS INDEX: 28.89 KG/M2 | RESPIRATION RATE: 16 BRPM | WEIGHT: 153 LBS

## 2020-03-24 LAB
ANION GAP SERPL CALCULATED.3IONS-SCNC: 11 MEQ/L (ref 8–16)
BASOPHILS # BLD: 0.3 %
BASOPHILS ABSOLUTE: 0 THOU/MM3 (ref 0–0.1)
BUN BLDV-MCNC: 3 MG/DL (ref 7–22)
CALCIUM SERPL-MCNC: 8.2 MG/DL (ref 8.5–10.5)
CHLORIDE BLD-SCNC: 107 MEQ/L (ref 98–111)
CO2: 18 MEQ/L (ref 23–33)
CREAT SERPL-MCNC: 0.4 MG/DL (ref 0.4–1.2)
EOSINOPHIL # BLD: 3.1 %
EOSINOPHILS ABSOLUTE: 0.2 THOU/MM3 (ref 0–0.4)
ERYTHROCYTE [DISTWIDTH] IN BLOOD BY AUTOMATED COUNT: 12.9 % (ref 11.5–14.5)
ERYTHROCYTE [DISTWIDTH] IN BLOOD BY AUTOMATED COUNT: 46.5 FL (ref 35–45)
GFR SERPL CREATININE-BSD FRML MDRD: > 90 ML/MIN/1.73M2
GLUCOSE BLD-MCNC: 74 MG/DL (ref 70–108)
HCT VFR BLD CALC: 28.6 % (ref 37–47)
HEMOGLOBIN: 9 GM/DL (ref 12–16)
IMMATURE GRANS (ABS): 0.02 THOU/MM3 (ref 0–0.07)
IMMATURE GRANULOCYTES: 0.3 %
LYMPHOCYTES # BLD: 25.5 %
LYMPHOCYTES ABSOLUTE: 1.7 THOU/MM3 (ref 1–4.8)
MCH RBC QN AUTO: 31.1 PG (ref 26–33)
MCHC RBC AUTO-ENTMCNC: 31.5 GM/DL (ref 32.2–35.5)
MCV RBC AUTO: 99 FL (ref 81–99)
MONOCYTES # BLD: 10.2 %
MONOCYTES ABSOLUTE: 0.7 THOU/MM3 (ref 0.4–1.3)
NUCLEATED RED BLOOD CELLS: 0 /100 WBC
PLATELET # BLD: 149 THOU/MM3 (ref 130–400)
PMV BLD AUTO: 10.6 FL (ref 9.4–12.4)
POTASSIUM SERPL-SCNC: 3.5 MEQ/L (ref 3.5–5.2)
POTASSIUM SERPL-SCNC: 4.3 MEQ/L (ref 3.5–5.2)
RBC # BLD: 2.89 MILL/MM3 (ref 4.2–5.4)
SEG NEUTROPHILS: 60.6 %
SEGMENTED NEUTROPHILS ABSOLUTE COUNT: 4 THOU/MM3 (ref 1.8–7.7)
SODIUM BLD-SCNC: 136 MEQ/L (ref 135–145)
VANCOMYCIN TROUGH: 4.2 UG/ML (ref 5–15)
WBC # BLD: 6.6 THOU/MM3 (ref 4.8–10.8)

## 2020-03-24 PROCEDURE — 2580000003 HC RX 258: Performed by: UROLOGY

## 2020-03-24 PROCEDURE — 85025 COMPLETE CBC W/AUTO DIFF WBC: CPT

## 2020-03-24 PROCEDURE — 6370000000 HC RX 637 (ALT 250 FOR IP): Performed by: STUDENT IN AN ORGANIZED HEALTH CARE EDUCATION/TRAINING PROGRAM

## 2020-03-24 PROCEDURE — 84132 ASSAY OF SERUM POTASSIUM: CPT

## 2020-03-24 PROCEDURE — 6360000002 HC RX W HCPCS: Performed by: OBSTETRICS & GYNECOLOGY

## 2020-03-24 PROCEDURE — 6360000002 HC RX W HCPCS: Performed by: UROLOGY

## 2020-03-24 PROCEDURE — 6360000002 HC RX W HCPCS: Performed by: COUNSELOR

## 2020-03-24 PROCEDURE — 80048 BASIC METABOLIC PNL TOTAL CA: CPT

## 2020-03-24 PROCEDURE — 99232 SBSQ HOSP IP/OBS MODERATE 35: CPT | Performed by: NURSE PRACTITIONER

## 2020-03-24 PROCEDURE — 99238 HOSP IP/OBS DSCHRG MGMT 30/<: CPT | Performed by: FAMILY MEDICINE

## 2020-03-24 PROCEDURE — 94760 N-INVAS EAR/PLS OXIMETRY 1: CPT

## 2020-03-24 PROCEDURE — 2580000003 HC RX 258: Performed by: OBSTETRICS & GYNECOLOGY

## 2020-03-24 PROCEDURE — 36415 COLL VENOUS BLD VENIPUNCTURE: CPT

## 2020-03-24 PROCEDURE — 6370000000 HC RX 637 (ALT 250 FOR IP): Performed by: FAMILY MEDICINE

## 2020-03-24 PROCEDURE — 2580000003 HC RX 258: Performed by: COUNSELOR

## 2020-03-24 PROCEDURE — 80202 ASSAY OF VANCOMYCIN: CPT

## 2020-03-24 PROCEDURE — 6370000000 HC RX 637 (ALT 250 FOR IP): Performed by: UROLOGY

## 2020-03-24 RX ORDER — CIPROFLOXACIN 500 MG/1
500 TABLET, FILM COATED ORAL 2 TIMES DAILY
Qty: 14 TABLET | Refills: 0 | Status: ON HOLD | OUTPATIENT
Start: 2020-03-24 | End: 2020-03-27 | Stop reason: HOSPADM

## 2020-03-24 RX ORDER — GREEN TEA/HOODIA GORDONII 315-12.5MG
1 CAPSULE ORAL DAILY
Qty: 30 TABLET | Refills: 0 | Status: SHIPPED | OUTPATIENT
Start: 2020-03-24 | End: 2020-04-23

## 2020-03-24 RX ORDER — FERROUS SULFATE 325(65) MG
325 TABLET ORAL 2 TIMES DAILY
Qty: 60 TABLET | Refills: 0 | Status: SHIPPED | OUTPATIENT
Start: 2020-03-24 | End: 2020-04-21 | Stop reason: SDUPTHER

## 2020-03-24 RX ORDER — OXYCODONE HYDROCHLORIDE AND ACETAMINOPHEN 5; 325 MG/1; MG/1
1 TABLET ORAL EVERY 4 HOURS PRN
Qty: 15 TABLET | Refills: 0 | Status: SHIPPED | OUTPATIENT
Start: 2020-03-24 | End: 2020-03-29

## 2020-03-24 RX ADMIN — OXYCODONE HYDROCHLORIDE AND ACETAMINOPHEN 2 TABLET: 5; 325 TABLET ORAL at 03:03

## 2020-03-24 RX ADMIN — DOCUSATE SODIUM 100 MG: 100 CAPSULE, LIQUID FILLED ORAL at 10:09

## 2020-03-24 RX ADMIN — SODIUM CHLORIDE, PRESERVATIVE FREE 10 ML: 5 INJECTION INTRAVENOUS at 09:25

## 2020-03-24 RX ADMIN — VANCOMYCIN HYDROCHLORIDE 1000 MG: 1 INJECTION, POWDER, LYOPHILIZED, FOR SOLUTION INTRAVENOUS at 13:19

## 2020-03-24 RX ADMIN — ACETAMINOPHEN 650 MG: 325 TABLET ORAL at 13:19

## 2020-03-24 RX ADMIN — ACETAMINOPHEN 650 MG: 325 TABLET ORAL at 10:09

## 2020-03-24 RX ADMIN — ONDANSETRON 4 MG: 2 INJECTION INTRAMUSCULAR; INTRAVENOUS at 03:03

## 2020-03-24 RX ADMIN — VANCOMYCIN HYDROCHLORIDE 1000 MG: 1 INJECTION, POWDER, LYOPHILIZED, FOR SOLUTION INTRAVENOUS at 05:11

## 2020-03-24 ASSESSMENT — PAIN SCALES - GENERAL
PAINLEVEL_OUTOF10: 5
PAINLEVEL_OUTOF10: 5
PAINLEVEL_OUTOF10: 9
PAINLEVEL_OUTOF10: 4

## 2020-03-24 ASSESSMENT — PAIN DESCRIPTION - ORIENTATION: ORIENTATION: LEFT

## 2020-03-24 ASSESSMENT — PAIN DESCRIPTION - LOCATION: LOCATION: ABDOMEN

## 2020-03-24 NOTE — DISCHARGE INSTR - COC
***    Intake/Output Summary (Last 24 hours) at 3/24/2020 1548  Last data filed at 3/24/2020 1309  Gross per 24 hour   Intake 3657 ml   Output --   Net 3657 ml     I/O last 3 completed shifts: In: 4305 [P.O.:1660;  I.V.:]  Out: -     Safety Concerns:     { REANNA Safety Concerns:267018628}    Impairments/Disabilities:      { REANNA Impairments/Disabilities:168879484}    Nutrition Therapy:  Current Nutrition Therapy:   [address] REANNA Diet List:438629336}    Routes of Feeding: {CHP DME Other Feedings:029111067}  Liquids: {Slp liquid thickness:22929}  Daily Fluid Restriction: {CHP DME Yes amt example:464869719}  Last Modified Barium Swallow with Video (Video Swallowing Test): {Done Not Done PPYF:395333698}    Treatments at the Time of Hospital Discharge:   Respiratory Treatments: ***  Oxygen Therapy:  {Therapy; copd oxygen:97199}  Ventilator:    { CC Vent GGUU:509307919}    Rehab Therapies: {THERAPEUTIC INTERVENTION:3534120197}  Weight Bearing Status/Restrictions: [address]  Weight Bearin}  Other Medical Equipment (for information only, NOT a DME order):  {EQUIPMENT:359452529}  Other Treatments: ***    Patient's personal belongings (please select all that are sent with patient):  {[facility] DME Belongings:328272298}    RN SIGNATURE:  {Esignature:854064497}    CASE MANAGEMENT/SOCIAL WORK SECTION    Inpatient Status Date: ***    Readmission Risk Assessment Score:  Readmission Risk              Risk of Unplanned Readmission:        40           Discharging to Facility/ Agency   · Name:   · Address:  · Phone:  · Fax:    Dialysis Facility (if applicable)   · Name:  · Address:  · Dialysis Schedule:  · Phone:  · Fax:    / signature: {Esignature:762456300}    PHYSICIAN SECTION    Prognosis: {Prognosis:3126902281}    Condition at Discharge: [address] Patient Condition:784667957}    Rehab Potential (if transferring to Rehab): {Prognosis:1387040866}    Recommended Labs or Other Treatments After Discharge:

## 2020-03-24 NOTE — PLAN OF CARE
Problem: Pain:  Goal: Pain level will decrease  Description: Pain level will decrease  Outcome: Ongoing  Goal: Control of acute pain  Description: Control of acute pain  Outcome: Ongoing     Problem: Infection:  Goal: Will remain free from infection  Description: Will remain free from infection  Outcome: Ongoing     Problem: Safety:  Goal: Free from accidental physical injury  Description: Free from accidental physical injury  Outcome: Ongoing     Problem: Daily Care:  Goal: Daily care needs are met  Description: Daily care needs are met  Outcome: Ongoing

## 2020-03-24 NOTE — PROGRESS NOTES
Pharmacy Vancomycin Consult     Vancomycin Day: 3  Current Dosing: vanc 1000mg q12h    Temp max:  afebrile    Recent Labs     03/22/20  0757 03/23/20  0558   BUN 3* <2*       Recent Labs     03/22/20  0757 03/23/20  0558   CREATININE 0.5 0.5       Recent Labs     03/22/20  0555 03/23/20  0558   WBC 11.9* 8.1         Intake/Output Summary (Last 24 hours) at 3/24/2020 0546  Last data filed at 3/23/2020 2128  Gross per 24 hour   Intake 4122.61 ml   Output --   Net 4122.61 ml       Culture Date Source Results   3/20/2020 urine Methicillin-resistant staphylococcus hominis   3/20/2020 urine Mixed growth        Ht Readings from Last 1 Encounters:   03/20/20 5' 1\" (1.549 m)        Wt Readings from Last 1 Encounters:   03/20/20 153 lb (69.4 kg)         Body mass index is 28.91 kg/m². Estimated Creatinine Clearance: 157 mL/min (based on SCr of 0.5 mg/dL). Trough: 4.2    Assessment/Plan:  Will increase dose to vancomycin 1000mg q8h. Plan to recheck level in 2 days.   Jeni Roberts RPh, BCPS, BCGP  3/24/2020     5:48 AM

## 2020-03-24 NOTE — PROGRESS NOTES
Urology Progress Note    Chief Complaint: left flank pain    Subjective:   Patient is resting in bed  Voiding spontaneously   -flatus, -BM since last Friday  Ambulating with assistance  Tolerating regular diet, some nausea, on Zofran  Pain controlled  Denies fever or chills        Vitals:  BP (!) 96/59   Pulse 78   Temp 98.1 °F (36.7 °C) (Oral)   Resp 16   Ht 5' 1\" (1.549 m)   Wt 153 lb (69.4 kg)   LMP 2019 Comment: 26 WEEKS PREGNANT  SpO2 95%   BMI 28.91 kg/m²   Temp  Av.1 °F (36.7 °C)  Min: 98.1 °F (36.7 °C)  Max: 98.2 °F (36.8 °C)    Intake/Output Summary (Last 24 hours) at 3/24/2020 0853  Last data filed at 3/24/2020 5892  Gross per 24 hour   Intake 4379.61 ml   Output --   Net 4379.61 ml       Social History     Socioeconomic History    Marital status: Single     Spouse name: Not on file    Number of children: Not on file    Years of education: Not on file    Highest education level: Not on file   Occupational History    Not on file   Social Needs    Financial resource strain: Not on file    Food insecurity     Worry: Not on file     Inability: Not on file    Transportation needs     Medical: Not on file     Non-medical: Not on file   Tobacco Use    Smoking status: Former Smoker     Types: Cigarettes     Last attempt to quit: 2019     Years since quittin.3    Smokeless tobacco: Never Used    Tobacco comment: Vape   Substance and Sexual Activity    Alcohol use: No    Drug use: Yes     Types: Marijuana     Comment: LAST USED 2020    Sexual activity: Yes     Partners: Male   Lifestyle    Physical activity     Days per week: Not on file     Minutes per session: Not on file    Stress: Not on file   Relationships    Social connections     Talks on phone: Not on file     Gets together: Not on file     Attends Orthodoxy service: Not on file     Active member of club or organization: Not on file     Attends meetings of clubs or organizations: Not on file     Relationship

## 2020-03-24 NOTE — DISCHARGE SUMMARY
DISCHARGE SUMMARY      Patient Identification:   Jerod Morales   : 1995  MRN: [id number]   Account: [de-identified]      Patient's PCP: Brittney Vargas MD    Admit Date: 3/20/2020     Discharge Date:   3/24/2020    Admitting Physician: Sarah Deleon MD     Discharge Physician: Lexus Romeo DO     Discharge Diagnoses:  Left sided nephrolithiasis with hydronephrosis  Bladder stones  Acute cystitis with hematuria  29 weeks gestation   Hypokalemia   Benign proteinuria  Normocytic anemia   Homocystinemia   Cholelithiasis without cholecystitis  Substance use   Anion gap metabolic acidosis   The patient was seen and examined on day of discharge and this discharge summary is in conjunction with any daily progress note from day of discharge. Hospital Course:   Jerod Morales is a 27 y.o. female admitted to [address] [place] [address] on 3/20/2020 for Left nephrolithiasis with hydronephrosis. She presented to the ED with 3 days of left-sided flank pain. She has a history of stent replacements every 5 to 6 weeks since 2019 due to nephrolithiasis. Her stone measured 1.6 x 1.9 x 1 cm. Urology underwent cystoscopy with left ureteral stent exchange on 3/21. Her pain postoperatively was not controlled and she received Dilaudid, morphine, Tylenol. She was also noted to have bladder stones on renal ultrasound. Her urinalysis demonstrated moderate blood, 100+ protein, moderate leukocyte esterase. Her urine culture grew staph hominis. She initially was started on nitrofurantoin p.o. however it was changed to vancomycin due to sensitivities. On discharge she was sent home with ciprofloxacin for 7 days. She had a mild leukocytosis that resolved on .  OB/GYN was aware of her admission and she had daily NSTs and fetal heart tones every shift. Due to her proteinuria, preeclampsia labs were checked.   She had a protein to creatinine ratio of 3.09, however this was most likely due to the nephrolithiasis and stent placement. She also had normocytic anemia likely related to hemodilution and pregnancy. Her hemoglobin was 11.4 on admission and was 9.0 on discharge. She was sent home with iron supplementation. She has a history of homocystinemia and received folic acid. On her renal ultrasound she was incidentally found to have cholelithiasis without cholecystitis. The patient is aware and will follow up in the future with surgery outpatient. Her urine drug screen was positive for THC and opiates status post morphine in the ED. Exam:     Vitals:  Vitals:    03/23/20 2014 03/24/20 0301 03/24/20 0746 03/24/20 0930   BP: 104/62 104/72 (!) 96/59    Pulse: 90 90 78    Resp: 15 16 16    Temp: 98.2 °F (36.8 °C)  98.1 °F (36.7 °C)    TempSrc: Oral  Oral    SpO2: 97% 94% 95% 98%   Weight:       Height:         Weight: Weight: 153 lb (69.4 kg)     24 hour intake/output:    Intake/Output Summary (Last 24 hours) at 3/24/2020 1444  Last data filed at 3/24/2020 1309  Gross per 24 hour   Intake 3657 ml   Output --   Net 3657 ml          General appearance: No apparent distress, appears stated age and cooperative. HEENT: Pupils equal, round, and reactive to light. Conjunctivae/corneas clear. Neck: Supple, with full range of motion. No jugular venous distention. Trachea midline. Respiratory:  Normal respiratory effort. Clear to auscultation, bilaterally without Rales/Wheezes/Rhonchi. Cardiovascular: Regular rate and rhythm with normal S1/S2 without murmurs, rubs or gallops. Abdomen: Gravid, soft, non-tender, non-distended with normal bowel sounds. Musculoskeletal: passive and active ROM x 4 extremities. Skin: Skin color, texture, turgor normal.  No rashes or lesions. Neurologic:  Neurovascularly intact without any focal sensory/motor deficits.  Cranial nerves: II-XII intact, grossly non-focal.  Psychiatric: Alert and oriented, thought content appropriate, normal insight  Capillary Refill: Brisk,< 3 [place] 1 Medication Instructions AUL:[id number]    Printed on:03/24/20 6347   Medication Information                      ciprofloxacin (CIPRO) 500 MG tablet  Take 1 tablet by mouth 2 times daily for 7 days             ferrous sulfate (IRON 325) 325 (65 Fe) MG tablet  Take 1 tablet by mouth 2 times daily             oxybutynin (DITROPAN) 5 MG tablet  Take 1 tablet by mouth 3 times daily as needed (stent pain)             oxyCODONE-acetaminophen (PERCOCET) 5-325 MG per tablet  Take 1 tablet by mouth every 4 hours as needed for Pain for up to 5 days. Intended supply: 3 days. Take lowest dose possible to manage pain             Pediatric Multivit-Minerals-C (FLINTSTONES GUMMIES PLUS PO)  Take 1 tablet by mouth daily              Probiotic Acidophilus (FLORANEX) TABS  Take 1 tablet by mouth daily                 Time Spent on discharge is more than 30 minutes in the examination, evaluation, counseling and review of medications and discharge plan. Signed: Thank you Nereyda Crespo MD for the opportunity to be involved in this patient's care.     Electronically signed by Cy Darling DO on 3/24/2020 at 2:44 PM

## 2020-03-24 NOTE — PLAN OF CARE
Problem: Pain:  Description: Pain management should include both nonpharmacologic and pharmacologic interventions. Goal: Pain level will decrease  Description: Pain level will decrease  3/23/2020 2142 by Greg Negron RN  Outcome: Ongoing     Problem: Safety:  Goal: Free from accidental physical injury  Description: Free from accidental physical injury  3/23/2020 2142 by Greg Negron RN  Outcome: Ongoing     Problem: Daily Care:  Goal: Daily care needs are met  Description: Daily care needs are met  3/23/2020 2142 by Greg Negron RN  Outcome: Ongoing  Pt completes ADLs independently after set up     Problem: Discharge Planning:  Goal: Patients continuum of care needs are met  Description: Patients continuum of care needs are met  3/23/2020 2142 by Greg Negron RN  Outcome: Ongoing   Home with family at discharge, when appropriate    Care plan reviewed with patient  Patient  verbalize understanding of the plan of care and contribute to goal setting.

## 2020-03-25 ENCOUNTER — HOSPITAL ENCOUNTER (INPATIENT)
Age: 25
LOS: 1 days | Discharge: HOME | DRG: 832 | End: 2020-03-27
Attending: INTERNAL MEDICINE | Admitting: INTERNAL MEDICINE
Payer: COMMERCIAL

## 2020-03-25 ENCOUNTER — APPOINTMENT (OUTPATIENT)
Dept: ULTRASOUND IMAGING | Age: 25
DRG: 832 | End: 2020-03-25
Payer: COMMERCIAL

## 2020-03-25 PROBLEM — N39.0 UTI (URINARY TRACT INFECTION): Status: ACTIVE | Noted: 2020-03-25

## 2020-03-25 LAB
ANION GAP SERPL CALCULATED.3IONS-SCNC: 14 MEQ/L (ref 8–16)
BACTERIA: ABNORMAL /HPF
BASOPHILS # BLD: 0.2 %
BASOPHILS ABSOLUTE: 0 THOU/MM3 (ref 0–0.1)
BILIRUBIN URINE: NEGATIVE
BLOOD, URINE: ABNORMAL
BUN BLDV-MCNC: 5 MG/DL (ref 7–22)
CALCIUM SERPL-MCNC: 8.9 MG/DL (ref 8.5–10.5)
CASTS 2: ABNORMAL /LPF
CASTS UA: ABNORMAL /LPF
CHARACTER, URINE: ABNORMAL
CHLORIDE BLD-SCNC: 105 MEQ/L (ref 98–111)
CO2: 21 MEQ/L (ref 23–33)
COLOR: YELLOW
CREAT SERPL-MCNC: 0.5 MG/DL (ref 0.4–1.2)
CRYSTALS, UA: ABNORMAL
EOSINOPHIL # BLD: 2 %
EOSINOPHILS ABSOLUTE: 0.2 THOU/MM3 (ref 0–0.4)
EPITHELIAL CELLS, UA: ABNORMAL /HPF
ERYTHROCYTE [DISTWIDTH] IN BLOOD BY AUTOMATED COUNT: 12.5 % (ref 11.5–14.5)
ERYTHROCYTE [DISTWIDTH] IN BLOOD BY AUTOMATED COUNT: 43.4 FL (ref 35–45)
GFR SERPL CREATININE-BSD FRML MDRD: > 90 ML/MIN/1.73M2
GLUCOSE BLD-MCNC: 83 MG/DL (ref 70–108)
GLUCOSE URINE: NEGATIVE MG/DL
HCT VFR BLD CALC: 32.7 % (ref 37–47)
HEMOGLOBIN: 10.6 GM/DL (ref 12–16)
IMMATURE GRANS (ABS): 0.04 THOU/MM3 (ref 0–0.07)
IMMATURE GRANULOCYTES: 0.5 %
KETONES, URINE: NEGATIVE
LEUKOCYTE ESTERASE, URINE: ABNORMAL
LYMPHOCYTES # BLD: 23.4 %
LYMPHOCYTES ABSOLUTE: 2 THOU/MM3 (ref 1–4.8)
MCH RBC QN AUTO: 30.5 PG (ref 26–33)
MCHC RBC AUTO-ENTMCNC: 32.4 GM/DL (ref 32.2–35.5)
MCV RBC AUTO: 94.2 FL (ref 81–99)
MISCELLANEOUS 2: ABNORMAL
MONOCYTES # BLD: 7.1 %
MONOCYTES ABSOLUTE: 0.6 THOU/MM3 (ref 0.4–1.3)
MUCUS: ABNORMAL
NITRITE, URINE: NEGATIVE
NUCLEATED RED BLOOD CELLS: 0 /100 WBC
OSMOLALITY CALCULATION: 275.8 MOSMOL/KG (ref 275–300)
PH UA: 6.5 (ref 5–9)
PLATELET # BLD: 184 THOU/MM3 (ref 130–400)
PMV BLD AUTO: 10.8 FL (ref 9.4–12.4)
POTASSIUM SERPL-SCNC: 3.4 MEQ/L (ref 3.5–5.2)
PROTEIN UA: 30
RBC # BLD: 3.47 MILL/MM3 (ref 4.2–5.4)
RBC URINE: ABNORMAL /HPF
RENAL EPITHELIAL, UA: ABNORMAL
SEG NEUTROPHILS: 66.8 %
SEGMENTED NEUTROPHILS ABSOLUTE COUNT: 5.8 THOU/MM3 (ref 1.8–7.7)
SODIUM BLD-SCNC: 140 MEQ/L (ref 135–145)
SPECIFIC GRAVITY, URINE: 1.02 (ref 1–1.03)
UROBILINOGEN, URINE: 1 EU/DL (ref 0–1)
WBC # BLD: 8.7 THOU/MM3 (ref 4.8–10.8)
WBC UA: ABNORMAL /HPF
YEAST: ABNORMAL

## 2020-03-25 PROCEDURE — 96361 HYDRATE IV INFUSION ADD-ON: CPT

## 2020-03-25 PROCEDURE — 80048 BASIC METABOLIC PNL TOTAL CA: CPT

## 2020-03-25 PROCEDURE — 94760 N-INVAS EAR/PLS OXIMETRY 1: CPT

## 2020-03-25 PROCEDURE — 76770 US EXAM ABDO BACK WALL COMP: CPT

## 2020-03-25 PROCEDURE — 2580000003 HC RX 258: Performed by: NURSE PRACTITIONER

## 2020-03-25 PROCEDURE — 6360000002 HC RX W HCPCS: Performed by: NURSE PRACTITIONER

## 2020-03-25 PROCEDURE — 96366 THER/PROPH/DIAG IV INF ADDON: CPT

## 2020-03-25 PROCEDURE — 6360000002 HC RX W HCPCS: Performed by: PHYSICIAN ASSISTANT

## 2020-03-25 PROCEDURE — 6370000000 HC RX 637 (ALT 250 FOR IP): Performed by: PHYSICIAN ASSISTANT

## 2020-03-25 PROCEDURE — 99220 PR INITIAL OBSERVATION CARE/DAY 70 MINUTES: CPT | Performed by: NURSE PRACTITIONER

## 2020-03-25 PROCEDURE — 6370000000 HC RX 637 (ALT 250 FOR IP): Performed by: NURSE PRACTITIONER

## 2020-03-25 PROCEDURE — 96376 TX/PRO/DX INJ SAME DRUG ADON: CPT

## 2020-03-25 PROCEDURE — 87086 URINE CULTURE/COLONY COUNT: CPT

## 2020-03-25 PROCEDURE — 99284 EMERGENCY DEPT VISIT MOD MDM: CPT

## 2020-03-25 PROCEDURE — G0378 HOSPITAL OBSERVATION PER HR: HCPCS

## 2020-03-25 PROCEDURE — 85025 COMPLETE CBC W/AUTO DIFF WBC: CPT

## 2020-03-25 PROCEDURE — 99243 OFF/OP CNSLTJ NEW/EST LOW 30: CPT | Performed by: NURSE PRACTITIONER

## 2020-03-25 PROCEDURE — 36415 COLL VENOUS BLD VENIPUNCTURE: CPT

## 2020-03-25 PROCEDURE — 81001 URINALYSIS AUTO W/SCOPE: CPT

## 2020-03-25 PROCEDURE — 59025 FETAL NON-STRESS TEST: CPT

## 2020-03-25 PROCEDURE — 96375 TX/PRO/DX INJ NEW DRUG ADDON: CPT

## 2020-03-25 PROCEDURE — 96374 THER/PROPH/DIAG INJ IV PUSH: CPT

## 2020-03-25 PROCEDURE — 96365 THER/PROPH/DIAG IV INF INIT: CPT

## 2020-03-25 RX ORDER — OXYBUTYNIN CHLORIDE 5 MG/1
5 TABLET ORAL 3 TIMES DAILY PRN
Status: DISCONTINUED | OUTPATIENT
Start: 2020-03-25 | End: 2020-03-27 | Stop reason: HOSPADM

## 2020-03-25 RX ORDER — SODIUM CHLORIDE 0.9 % (FLUSH) 0.9 %
10 SYRINGE (ML) INJECTION EVERY 12 HOURS SCHEDULED
Status: DISCONTINUED | OUTPATIENT
Start: 2020-03-25 | End: 2020-03-27 | Stop reason: HOSPADM

## 2020-03-25 RX ORDER — CIPROFLOXACIN 2 MG/ML
400 INJECTION, SOLUTION INTRAVENOUS ONCE
Status: DISCONTINUED | OUTPATIENT
Start: 2020-03-25 | End: 2020-03-25

## 2020-03-25 RX ORDER — ACETAMINOPHEN 325 MG/1
650 TABLET ORAL EVERY 6 HOURS PRN
Status: DISCONTINUED | OUTPATIENT
Start: 2020-03-25 | End: 2020-03-27 | Stop reason: HOSPADM

## 2020-03-25 RX ORDER — MORPHINE SULFATE 4 MG/ML
4 INJECTION, SOLUTION INTRAMUSCULAR; INTRAVENOUS ONCE
Status: COMPLETED | OUTPATIENT
Start: 2020-03-25 | End: 2020-03-25

## 2020-03-25 RX ORDER — 0.9 % SODIUM CHLORIDE 0.9 %
1000 INTRAVENOUS SOLUTION INTRAVENOUS ONCE
Status: COMPLETED | OUTPATIENT
Start: 2020-03-25 | End: 2020-03-25

## 2020-03-25 RX ORDER — OXYCODONE HYDROCHLORIDE AND ACETAMINOPHEN 5; 325 MG/1; MG/1
1 TABLET ORAL EVERY 4 HOURS PRN
Status: DISCONTINUED | OUTPATIENT
Start: 2020-03-25 | End: 2020-03-27 | Stop reason: HOSPADM

## 2020-03-25 RX ORDER — ACETAMINOPHEN 650 MG/1
650 SUPPOSITORY RECTAL EVERY 6 HOURS PRN
Status: DISCONTINUED | OUTPATIENT
Start: 2020-03-25 | End: 2020-03-27 | Stop reason: HOSPADM

## 2020-03-25 RX ORDER — DOCUSATE SODIUM 100 MG/1
100 CAPSULE, LIQUID FILLED ORAL DAILY
Status: DISCONTINUED | OUTPATIENT
Start: 2020-03-25 | End: 2020-03-27 | Stop reason: HOSPADM

## 2020-03-25 RX ORDER — MORPHINE SULFATE 2 MG/ML
2 INJECTION, SOLUTION INTRAMUSCULAR; INTRAVENOUS EVERY 4 HOURS PRN
Status: COMPLETED | OUTPATIENT
Start: 2020-03-25 | End: 2020-03-25

## 2020-03-25 RX ORDER — ONDANSETRON 2 MG/ML
4 INJECTION INTRAMUSCULAR; INTRAVENOUS EVERY 6 HOURS PRN
Status: CANCELLED | OUTPATIENT
Start: 2020-03-25

## 2020-03-25 RX ORDER — ONDANSETRON 2 MG/ML
4 INJECTION INTRAMUSCULAR; INTRAVENOUS ONCE
Status: COMPLETED | OUTPATIENT
Start: 2020-03-25 | End: 2020-03-25

## 2020-03-25 RX ORDER — CIPROFLOXACIN 2 MG/ML
400 INJECTION, SOLUTION INTRAVENOUS EVERY 12 HOURS
Status: DISCONTINUED | OUTPATIENT
Start: 2020-03-25 | End: 2020-03-27 | Stop reason: HOSPADM

## 2020-03-25 RX ORDER — SODIUM CHLORIDE 0.9 % (FLUSH) 0.9 %
10 SYRINGE (ML) INJECTION PRN
Status: DISCONTINUED | OUTPATIENT
Start: 2020-03-25 | End: 2020-03-27 | Stop reason: HOSPADM

## 2020-03-25 RX ORDER — FERROUS SULFATE 325(65) MG
325 TABLET ORAL 2 TIMES DAILY WITH MEALS
Status: DISCONTINUED | OUTPATIENT
Start: 2020-03-25 | End: 2020-03-27 | Stop reason: HOSPADM

## 2020-03-25 RX ORDER — MORPHINE SULFATE 2 MG/ML
2 INJECTION, SOLUTION INTRAMUSCULAR; INTRAVENOUS EVERY 4 HOURS PRN
Status: DISCONTINUED | OUTPATIENT
Start: 2020-03-25 | End: 2020-03-25

## 2020-03-25 RX ORDER — ONDANSETRON 2 MG/ML
4 INJECTION INTRAMUSCULAR; INTRAVENOUS EVERY 6 HOURS PRN
Status: DISCONTINUED | OUTPATIENT
Start: 2020-03-25 | End: 2020-03-27 | Stop reason: HOSPADM

## 2020-03-25 RX ORDER — LACTOBACILLUS RHAMNOSUS GG 10B CELL
1 CAPSULE ORAL DAILY
Status: DISCONTINUED | OUTPATIENT
Start: 2020-03-25 | End: 2020-03-27 | Stop reason: HOSPADM

## 2020-03-25 RX ORDER — PROMETHAZINE HYDROCHLORIDE 25 MG/1
12.5 TABLET ORAL EVERY 6 HOURS PRN
Status: CANCELLED | OUTPATIENT
Start: 2020-03-25

## 2020-03-25 RX ORDER — SODIUM CHLORIDE 9 MG/ML
INJECTION, SOLUTION INTRAVENOUS CONTINUOUS
Status: DISCONTINUED | OUTPATIENT
Start: 2020-03-25 | End: 2020-03-27 | Stop reason: HOSPADM

## 2020-03-25 RX ORDER — POLYETHYLENE GLYCOL 3350 17 G/17G
17 POWDER, FOR SOLUTION ORAL DAILY PRN
Status: DISCONTINUED | OUTPATIENT
Start: 2020-03-25 | End: 2020-03-27 | Stop reason: HOSPADM

## 2020-03-25 RX ADMIN — CIPROFLOXACIN 400 MG: 2 INJECTION, SOLUTION INTRAVENOUS at 22:51

## 2020-03-25 RX ADMIN — FERROUS SULFATE TAB 325 MG (65 MG ELEMENTAL FE) 325 MG: 325 (65 FE) TAB at 10:46

## 2020-03-25 RX ADMIN — MORPHINE SULFATE 2 MG: 2 INJECTION, SOLUTION INTRAMUSCULAR; INTRAVENOUS at 19:57

## 2020-03-25 RX ADMIN — DOCUSATE SODIUM 100 MG: 100 CAPSULE, LIQUID FILLED ORAL at 14:36

## 2020-03-25 RX ADMIN — MORPHINE SULFATE 4 MG: 4 INJECTION, SOLUTION INTRAMUSCULAR; INTRAVENOUS at 07:07

## 2020-03-25 RX ADMIN — SODIUM CHLORIDE 1000 ML: 9 INJECTION, SOLUTION INTRAVENOUS at 05:16

## 2020-03-25 RX ADMIN — MORPHINE SULFATE 4 MG: 4 INJECTION, SOLUTION INTRAMUSCULAR; INTRAVENOUS at 05:15

## 2020-03-25 RX ADMIN — OXYCODONE HYDROCHLORIDE AND ACETAMINOPHEN 1 TABLET: 5; 325 TABLET ORAL at 21:26

## 2020-03-25 RX ADMIN — SODIUM CHLORIDE: 9 INJECTION, SOLUTION INTRAVENOUS at 10:50

## 2020-03-25 RX ADMIN — Medication 1 CAPSULE: at 10:46

## 2020-03-25 RX ADMIN — FERROUS SULFATE TAB 325 MG (65 MG ELEMENTAL FE) 325 MG: 325 (65 FE) TAB at 16:25

## 2020-03-25 RX ADMIN — MORPHINE SULFATE 2 MG: 2 INJECTION, SOLUTION INTRAMUSCULAR; INTRAVENOUS at 11:58

## 2020-03-25 RX ADMIN — ONDANSETRON 4 MG: 2 INJECTION INTRAMUSCULAR; INTRAVENOUS at 05:14

## 2020-03-25 RX ADMIN — CIPROFLOXACIN 400 MG: 2 INJECTION, SOLUTION INTRAVENOUS at 10:46

## 2020-03-25 RX ADMIN — ONDANSETRON 4 MG: 2 INJECTION INTRAMUSCULAR; INTRAVENOUS at 11:58

## 2020-03-25 RX ADMIN — MORPHINE SULFATE 2 MG: 2 INJECTION, SOLUTION INTRAMUSCULAR; INTRAVENOUS at 23:58

## 2020-03-25 RX ADMIN — ONDANSETRON 4 MG: 2 INJECTION INTRAMUSCULAR; INTRAVENOUS at 21:26

## 2020-03-25 RX ADMIN — HYDROMORPHONE HYDROCHLORIDE 1 MG: 1 INJECTION, SOLUTION INTRAMUSCULAR; INTRAVENOUS; SUBCUTANEOUS at 08:50

## 2020-03-25 RX ADMIN — OXYCODONE HYDROCHLORIDE AND ACETAMINOPHEN 1 TABLET: 5; 325 TABLET ORAL at 10:42

## 2020-03-25 ASSESSMENT — PAIN DESCRIPTION - ONSET
ONSET: ON-GOING

## 2020-03-25 ASSESSMENT — PAIN DESCRIPTION - DESCRIPTORS
DESCRIPTORS: CONSTANT;DISCOMFORT;SHARP
DESCRIPTORS: STABBING;SHARP
DESCRIPTORS: SHARP;STABBING
DESCRIPTORS: ACHING;CRAMPING;DISCOMFORT

## 2020-03-25 ASSESSMENT — PAIN SCALES - GENERAL
PAINLEVEL_OUTOF10: 6
PAINLEVEL_OUTOF10: 8
PAINLEVEL_OUTOF10: 9
PAINLEVEL_OUTOF10: 10
PAINLEVEL_OUTOF10: 8
PAINLEVEL_OUTOF10: 9
PAINLEVEL_OUTOF10: 8
PAINLEVEL_OUTOF10: 7
PAINLEVEL_OUTOF10: 10
PAINLEVEL_OUTOF10: 6
PAINLEVEL_OUTOF10: 5
PAINLEVEL_OUTOF10: 10
PAINLEVEL_OUTOF10: 8
PAINLEVEL_OUTOF10: 8
PAINLEVEL_OUTOF10: 10
PAINLEVEL_OUTOF10: 3
PAINLEVEL_OUTOF10: 8

## 2020-03-25 ASSESSMENT — PAIN DESCRIPTION - PROGRESSION

## 2020-03-25 ASSESSMENT — PAIN DESCRIPTION - LOCATION
LOCATION: FLANK
LOCATION: BACK
LOCATION: FLANK

## 2020-03-25 ASSESSMENT — PAIN DESCRIPTION - PAIN TYPE
TYPE: ACUTE PAIN

## 2020-03-25 ASSESSMENT — PAIN DESCRIPTION - FREQUENCY
FREQUENCY: CONTINUOUS

## 2020-03-25 ASSESSMENT — PAIN DESCRIPTION - ORIENTATION
ORIENTATION: LEFT
ORIENTATION: LEFT;MID
ORIENTATION: LEFT

## 2020-03-25 ASSESSMENT — PAIN - FUNCTIONAL ASSESSMENT
PAIN_FUNCTIONAL_ASSESSMENT: PREVENTS OR INTERFERES SOME ACTIVE ACTIVITIES AND ADLS
PAIN_FUNCTIONAL_ASSESSMENT: ACTIVITIES ARE NOT PREVENTED

## 2020-03-25 NOTE — ED PROVIDER NOTES
Patient was signed out to me by Anupam Valverde CNP at end of shift pending renal ultrasound. Patient presents for complaints of uncontrolled left flank pain. She had recent hospital admission for nephrolithiasis. Please see discharge summary below: Mamie Ryder is a 27 y.o. female admitted to [address] [place] [address] on 3/20/2020 for Left nephrolithiasis with hydronephrosis. She presented to the ED with 3 days of left-sided flank pain. She has a history of stent replacements every 5 to 6 weeks since December 2019 due to nephrolithiasis. Her stone measured 1.6 x 1.9 x 1 cm. Urology underwent cystoscopy with left ureteral stent exchange on 3/21. Her pain postoperatively was not controlled and she received Dilaudid, morphine, Tylenol. She was also noted to have bladder stones on renal ultrasound. Her urinalysis demonstrated moderate blood, 100+ protein, moderate leukocyte esterase. Her urine culture grew staph hominis. She initially was started on nitrofurantoin p.o. however it was changed to vancomycin due to sensitivities. On discharge she was sent home with ciprofloxacin for 7 days. She had a mild leukocytosis that resolved on March 23.  OB/GYN was aware of her admission and she had daily NSTs and fetal heart tones every shift. Due to her proteinuria, preeclampsia labs were checked. She had a protein to creatinine ratio of 3.09, however this was most likely due to the nephrolithiasis and stent placement. She also had normocytic anemia likely related to hemodilution and pregnancy. Her hemoglobin was 11.4 on admission and was 9.0 on discharge. She was sent home with iron supplementation. She has a history of homocystinemia and received folic acid. On her renal ultrasound she was incidentally found to have cholelithiasis without cholecystitis. The patient is aware and will follow up in the future with surgery outpatient.   Her urine drug screen was positive for THC and opiates status post

## 2020-03-25 NOTE — H&P
Prior to Admission medications    Medication Sig Start Date End Date Taking? Authorizing Provider   ciprofloxacin (CIPRO) 500 MG tablet Take 1 tablet by mouth 2 times daily for 7 days 3/24/20 3/31/20 Yes Erika Kate DO   oxyCODONE-acetaminophen (PERCOCET) 5-325 MG per tablet Take 1 tablet by mouth every 4 hours as needed for Pain for up to 5 days. Intended supply: 3 days. Take lowest dose possible to manage pain 3/24/20 3/29/20 Yes Claudia NOAMN DockeryN - CNP   oxybutynin (DITROPAN) 5 MG tablet Take 1 tablet by mouth 3 times daily as needed (stent pain) 3/18/20  Yes LEV Tran CNP   Probiotic Acidophilus [facility]) TABS Take 1 tablet by mouth daily 3/24/20 4/23/20  Erika Kate DO   ferrous sulfate (IRON 325) 325 (65 Fe) MG tablet Take 1 tablet by mouth 2 times daily 3/24/20   Wallace Borjas DO   Pediatric Multivit-Minerals-C (FLINTSTONES GUMMIES PLUS PO) Take 1 tablet by mouth daily     Historical Provider, MD       Allergies:  Patient has no known allergies. Social History:   reports that she quit smoking about 3 months ago. Her smoking use included cigarettes. She has never used smokeless tobacco. She reports current drug use. Drug: Marijuana. She reports that she does not drink alcohol.     Family History:      Positive as follows:        Problem Relation Age of Onset    Heart Disease Father     Heart Disease Paternal Grandfather        REVIEW OF SYSTEMS:     Constitutional: ROS: negative for - chills or fever  Head: no headache, no head injury, no migraine   Eyes ROS: denies blurred/double vision  Ears ROS: no hearing difficulty, no tinnitus  Mouth and Throat ROS: no ulceration, dysphagia, dental caries  Psychological ROS: no depression, no anxiety, no panic attacks, denies suicide/homicide ideation  Endocrine ROS: denies polyuria, polydypsia, no heat or cold intolerance  Respiratory ROS: no cough, shortness of breath, or wheezing  Cardiovascular ROS: no chest pain or dyspnea on exertion  Gastrointestinal ROS: positive for - abdominal pain and nausea  Genito-Urinary ROS: denies dysuria, frequency, (+) urgency; denies hematuria  Musculoskeletal ROS: negative  Neurological ROS: no syncope, no seizures, no numbness or tingling of hands, no numbness or tingling of feet, no paresis  Dermatology: no skin rash, no eczema  Endocrine: no polyuria, polydypsia, no heat/cold intolerance  Hematology: denies bruising easily, denies bleeding problems, denies clotting disorders    PHYSICAL EXAM:    /61   Pulse 87   Temp 98 °F (36.7 °C) (Oral)   Resp 17   Ht 5' 1\" (1.549 m)   Wt 154 lb (69.9 kg)   LMP 08/26/2019 Comment: 26 WEEKS PREGNANT  SpO2 95%   BMI 29.10 kg/m²     General appearance:  No apparent distress, appears stated age and cooperative. HEENT:  Normal cephalic, atraumatic without obvious deformity. Pupils equal, round, and reactive to light. Conjunctivae/corneas clear. Neck: Supple, with full range of motion. No jugular venous distention. Trachea midline. Respiratory:  Normal respiratory effort. Clear to auscultation, bilaterally without Rales/Wheezes/Rhonchi. Cardiovascular:  Regular rate and rhythm with normal S1/S2 without murmurs, rubs or gallops. Abdomen: Soft, non-tender, non-distended with normal bowel sounds. Musculoskeletal:  No clubbing, cyanosis or edema bilaterally. Full range of motion without deformity. Skin: Skin color, texture, turgor normal.    Neurologic:  Neurovascularly intact without any focal sensory/motor deficits.  Cranial nerves: II-XII intact, grossly non-focal.  Psychiatric:  Alert and oriented, thought content appropriate  Capillary Refill: Brisk,< 3 seconds   Peripheral Pulses: +2 palpable, equal bilaterally       Labs:     Recent Labs     03/23/20  0558 03/24/20  0744 03/25/20  0509   WBC 8.1 6.6 8.7   HGB 9.5* 9.0* 10.6*   HCT 29.7* 28.6* 32.7*    149 184     Recent Labs     03/23/20  0558 03/24/20  0027 03/24/20  0744

## 2020-03-25 NOTE — CONSULTS
Resp 16   Ht 5' 1\" (1.549 m)   Wt 161 lb (73 kg)   LMP 08/26/2019 Comment: 26 WEEKS PREGNANT  SpO2 97%   BMI 30.42 kg/m²       CONSTITUTIONAL:  awake, alert, cooperative, no apparent distress, and appears stated age  EYES:  lids and lashes normal  LUNGS:  no increased work of breathing, good air exchange, no retractions and clear to auscultation  CARDIOVASCULAR:  regular rate and rhythm, normal S1 and S2 and no murmur noted  ABDOMEN:  Gravid abdomin appropriate for gestation, soft, non-distended, tenderness noted left flank and no masses palpated  SKIN:  no bruising or bleeding, normal skin color, texture, turgor and no redness, warmth, or swelling    DATA:  Labs:    CBC:   Lab Results   Component Value Date    WBC 8.7 03/25/2020    RBC 3.47 03/25/2020    HGB 10.6 03/25/2020    HCT 32.7 03/25/2020    MCV 94.2 03/25/2020    RDW 13.1 05/19/2018     03/25/2020     BMP:    Lab Results   Component Value Date     03/25/2020    K 3.4 03/25/2020    K 3.4 12/02/2019     03/25/2020    CO2 21 03/25/2020    BUN 5 03/25/2020       ASSESSMENT AND PLAN:      Active Problems:    30 wk gestation    Renal colic     Left flank ain    Kidney stone    Hypokalemia    Constipation     Urology seeing pt for renal issues. From OB standpoint: advised pt to monitor for contractions and notify nursing if happening. Will monitor with daily NST. Encourage ambulation and water intake to help with constipation.      Peg Coles PA-C

## 2020-03-26 ENCOUNTER — ANESTHESIA (OUTPATIENT)
Dept: OPERATING ROOM | Age: 25
DRG: 832 | End: 2020-03-26
Payer: COMMERCIAL

## 2020-03-26 ENCOUNTER — ANESTHESIA EVENT (OUTPATIENT)
Dept: OPERATING ROOM | Age: 25
DRG: 832 | End: 2020-03-26
Payer: COMMERCIAL

## 2020-03-26 VITALS — OXYGEN SATURATION: 98 % | SYSTOLIC BLOOD PRESSURE: 96 MMHG | DIASTOLIC BLOOD PRESSURE: 59 MMHG

## 2020-03-26 LAB
ANION GAP SERPL CALCULATED.3IONS-SCNC: 12 MEQ/L (ref 8–16)
BUN BLDV-MCNC: 3 MG/DL (ref 7–22)
CALCIUM SERPL-MCNC: 7.9 MG/DL (ref 8.5–10.5)
CHLORIDE BLD-SCNC: 106 MEQ/L (ref 98–111)
CO2: 22 MEQ/L (ref 23–33)
CREAT SERPL-MCNC: 0.5 MG/DL (ref 0.4–1.2)
ERYTHROCYTE [DISTWIDTH] IN BLOOD BY AUTOMATED COUNT: 12.7 % (ref 11.5–14.5)
ERYTHROCYTE [DISTWIDTH] IN BLOOD BY AUTOMATED COUNT: 44 FL (ref 35–45)
GFR SERPL CREATININE-BSD FRML MDRD: > 90 ML/MIN/1.73M2
GLUCOSE BLD-MCNC: 95 MG/DL (ref 70–108)
HCT VFR BLD CALC: 28.1 % (ref 37–47)
HEMOGLOBIN: 9.1 GM/DL (ref 12–16)
MAGNESIUM: 1.5 MG/DL (ref 1.6–2.4)
MCH RBC QN AUTO: 30.8 PG (ref 26–33)
MCHC RBC AUTO-ENTMCNC: 32.4 GM/DL (ref 32.2–35.5)
MCV RBC AUTO: 95.3 FL (ref 81–99)
ORGANISM: ABNORMAL
PLATELET # BLD: 168 THOU/MM3 (ref 130–400)
PMV BLD AUTO: 10.7 FL (ref 9.4–12.4)
POTASSIUM REFLEX MAGNESIUM: 3.3 MEQ/L (ref 3.5–5.2)
RBC # BLD: 2.95 MILL/MM3 (ref 4.2–5.4)
SODIUM BLD-SCNC: 140 MEQ/L (ref 135–145)
URINE CULTURE REFLEX: ABNORMAL
WBC # BLD: 7.4 THOU/MM3 (ref 4.8–10.8)

## 2020-03-26 PROCEDURE — 6370000000 HC RX 637 (ALT 250 FOR IP): Performed by: NURSE PRACTITIONER

## 2020-03-26 PROCEDURE — 2500000003 HC RX 250 WO HCPCS: Performed by: NURSE ANESTHETIST, CERTIFIED REGISTERED

## 2020-03-26 PROCEDURE — 6370000000 HC RX 637 (ALT 250 FOR IP): Performed by: UROLOGY

## 2020-03-26 PROCEDURE — 3700000000 HC ANESTHESIA ATTENDED CARE: Performed by: UROLOGY

## 2020-03-26 PROCEDURE — 83735 ASSAY OF MAGNESIUM: CPT

## 2020-03-26 PROCEDURE — 6360000002 HC RX W HCPCS: Performed by: NURSE ANESTHETIST, CERTIFIED REGISTERED

## 2020-03-26 PROCEDURE — 85027 COMPLETE CBC AUTOMATED: CPT

## 2020-03-26 PROCEDURE — 3700000001 HC ADD 15 MINUTES (ANESTHESIA): Performed by: UROLOGY

## 2020-03-26 PROCEDURE — 7100000001 HC PACU RECOVERY - ADDTL 15 MIN: Performed by: UROLOGY

## 2020-03-26 PROCEDURE — 6370000000 HC RX 637 (ALT 250 FOR IP): Performed by: NURSE ANESTHETIST, CERTIFIED REGISTERED

## 2020-03-26 PROCEDURE — 94760 N-INVAS EAR/PLS OXIMETRY 1: CPT

## 2020-03-26 PROCEDURE — 0TP98DZ REMOVAL OF INTRALUMINAL DEVICE FROM URETER, VIA NATURAL OR ARTIFICIAL OPENING ENDOSCOPIC: ICD-10-PCS | Performed by: UROLOGY

## 2020-03-26 PROCEDURE — 6360000002 HC RX W HCPCS: Performed by: UROLOGY

## 2020-03-26 PROCEDURE — 80048 BASIC METABOLIC PNL TOTAL CA: CPT

## 2020-03-26 PROCEDURE — 2580000003 HC RX 258: Performed by: NURSE PRACTITIONER

## 2020-03-26 PROCEDURE — 7100000000 HC PACU RECOVERY - FIRST 15 MIN: Performed by: UROLOGY

## 2020-03-26 PROCEDURE — 3600000003 HC SURGERY LEVEL 3 BASE: Performed by: UROLOGY

## 2020-03-26 PROCEDURE — 99232 SBSQ HOSP IP/OBS MODERATE 35: CPT | Performed by: INTERNAL MEDICINE

## 2020-03-26 PROCEDURE — 2709999900 HC NON-CHARGEABLE SUPPLY: Performed by: UROLOGY

## 2020-03-26 PROCEDURE — 36415 COLL VENOUS BLD VENIPUNCTURE: CPT

## 2020-03-26 PROCEDURE — 96361 HYDRATE IV INFUSION ADD-ON: CPT

## 2020-03-26 PROCEDURE — 1200000000 HC SEMI PRIVATE

## 2020-03-26 PROCEDURE — 59025 FETAL NON-STRESS TEST: CPT

## 2020-03-26 PROCEDURE — 3600000013 HC SURGERY LEVEL 3 ADDTL 15MIN: Performed by: UROLOGY

## 2020-03-26 RX ORDER — PROPOFOL 10 MG/ML
INJECTION, EMULSION INTRAVENOUS PRN
Status: DISCONTINUED | OUTPATIENT
Start: 2020-03-26 | End: 2020-03-26 | Stop reason: SDUPTHER

## 2020-03-26 RX ORDER — SCOLOPAMINE TRANSDERMAL SYSTEM 1 MG/1
PATCH, EXTENDED RELEASE TRANSDERMAL PRN
Status: DISCONTINUED | OUTPATIENT
Start: 2020-03-26 | End: 2020-03-26 | Stop reason: SDUPTHER

## 2020-03-26 RX ORDER — SUCCINYLCHOLINE/SOD CL,ISO/PF 200MG/10ML
SYRINGE (ML) INTRAVENOUS PRN
Status: DISCONTINUED | OUTPATIENT
Start: 2020-03-26 | End: 2020-03-26 | Stop reason: SDUPTHER

## 2020-03-26 RX ORDER — ONDANSETRON 2 MG/ML
INJECTION INTRAMUSCULAR; INTRAVENOUS PRN
Status: DISCONTINUED | OUTPATIENT
Start: 2020-03-26 | End: 2020-03-26 | Stop reason: SDUPTHER

## 2020-03-26 RX ADMIN — FERROUS SULFATE TAB 325 MG (65 MG ELEMENTAL FE) 325 MG: 325 (65 FE) TAB at 18:24

## 2020-03-26 RX ADMIN — FERROUS SULFATE TAB 325 MG (65 MG ELEMENTAL FE) 325 MG: 325 (65 FE) TAB at 13:54

## 2020-03-26 RX ADMIN — ONDANSETRON HYDROCHLORIDE 4 MG: 4 INJECTION, SOLUTION INTRAMUSCULAR; INTRAVENOUS at 11:42

## 2020-03-26 RX ADMIN — CIPROFLOXACIN 400 MG: 2 INJECTION, SOLUTION INTRAVENOUS at 13:53

## 2020-03-26 RX ADMIN — SODIUM CHLORIDE: 9 INJECTION, SOLUTION INTRAVENOUS at 11:54

## 2020-03-26 RX ADMIN — CIPROFLOXACIN 400 MG: 2 INJECTION, SOLUTION INTRAVENOUS at 23:40

## 2020-03-26 RX ADMIN — Medication 1 CAPSULE: at 13:54

## 2020-03-26 RX ADMIN — ONDANSETRON 4 MG: 2 INJECTION INTRAMUSCULAR; INTRAVENOUS at 14:51

## 2020-03-26 RX ADMIN — OXYCODONE HYDROCHLORIDE AND ACETAMINOPHEN 1 TABLET: 5; 325 TABLET ORAL at 18:24

## 2020-03-26 RX ADMIN — Medication 120 MG: at 11:31

## 2020-03-26 RX ADMIN — SODIUM CHLORIDE: 9 INJECTION, SOLUTION INTRAVENOUS at 01:28

## 2020-03-26 RX ADMIN — DOCUSATE SODIUM 100 MG: 100 CAPSULE, LIQUID FILLED ORAL at 13:54

## 2020-03-26 RX ADMIN — OXYBUTYNIN CHLORIDE 5 MG: 5 TABLET ORAL at 07:03

## 2020-03-26 RX ADMIN — OXYCODONE HYDROCHLORIDE AND ACETAMINOPHEN 1 TABLET: 5; 325 TABLET ORAL at 01:30

## 2020-03-26 RX ADMIN — PROPOFOL 150 MG: 10 INJECTION, EMULSION INTRAVENOUS at 11:31

## 2020-03-26 RX ADMIN — SCOPALAMINE 1 PATCH: 1 PATCH, EXTENDED RELEASE TRANSDERMAL at 11:27

## 2020-03-26 ASSESSMENT — PAIN SCALES - GENERAL
PAINLEVEL_OUTOF10: 7
PAINLEVEL_OUTOF10: 0
PAINLEVEL_OUTOF10: 9
PAINLEVEL_OUTOF10: 0
PAINLEVEL_OUTOF10: 1
PAINLEVEL_OUTOF10: 0
PAINLEVEL_OUTOF10: 0
PAINLEVEL_OUTOF10: 4
PAINLEVEL_OUTOF10: 10
PAINLEVEL_OUTOF10: 7
PAINLEVEL_OUTOF10: 2
PAINLEVEL_OUTOF10: 10
PAINLEVEL_OUTOF10: 0
PAINLEVEL_OUTOF10: 0

## 2020-03-26 ASSESSMENT — PAIN DESCRIPTION - FREQUENCY
FREQUENCY: CONTINUOUS
FREQUENCY: INTERMITTENT

## 2020-03-26 ASSESSMENT — PAIN DESCRIPTION - LOCATION
LOCATION: BACK
LOCATION: BACK

## 2020-03-26 ASSESSMENT — PULMONARY FUNCTION TESTS
PIF_VALUE: 18
PIF_VALUE: 1
PIF_VALUE: 6
PIF_VALUE: 0
PIF_VALUE: 16
PIF_VALUE: 18
PIF_VALUE: 1
PIF_VALUE: 21
PIF_VALUE: 17
PIF_VALUE: 17
PIF_VALUE: 7
PIF_VALUE: 18
PIF_VALUE: 18
PIF_VALUE: 17
PIF_VALUE: 18
PIF_VALUE: 19
PIF_VALUE: 18
PIF_VALUE: 17
PIF_VALUE: 18
PIF_VALUE: 1
PIF_VALUE: 0
PIF_VALUE: 17
PIF_VALUE: 1
PIF_VALUE: 17
PIF_VALUE: 18
PIF_VALUE: 1

## 2020-03-26 ASSESSMENT — PAIN DESCRIPTION - PAIN TYPE
TYPE: ACUTE PAIN

## 2020-03-26 ASSESSMENT — PAIN DESCRIPTION - DESCRIPTORS
DESCRIPTORS: SHARP;ACHING
DESCRIPTORS: ACHING;DISCOMFORT

## 2020-03-26 ASSESSMENT — PAIN DESCRIPTION - ORIENTATION
ORIENTATION: LEFT;MID
ORIENTATION: LOWER

## 2020-03-26 ASSESSMENT — PAIN DESCRIPTION - ONSET
ONSET: ON-GOING
ONSET: ON-GOING

## 2020-03-26 ASSESSMENT — PAIN DESCRIPTION - PROGRESSION
CLINICAL_PROGRESSION: NOT CHANGED
CLINICAL_PROGRESSION: NOT CHANGED

## 2020-03-26 NOTE — PLAN OF CARE
Problem: Pain:  Goal: Control of acute pain  Description: Control of acute pain  Outcome: Ongoing  Note: Patient is having left side pain. Given Prn pain meds this shift. Problem: Sensory:  Goal: General experience of comfort will improve  Description: General experience of comfort will improve  Outcome: Ongoing  Note: Patient resting comfortably in bed and clustering care to support rest this shift. PRN pain meds given. Problem: Urinary Elimination:  Goal: Signs and symptoms of infection will decrease  Description: Signs and symptoms of infection will decrease  Outcome: Ongoing  Note: Vital signs WDL, afebrile and no signs and symptoms present at this time. IV antibiotics given q12h. Plans to go to surgery tomorrow to have stent removed. Care plan reviewed with patient. Patient verbalized understanding of the plan of care and contributes to goal setting.

## 2020-03-26 NOTE — PROGRESS NOTES
Hospitalist Progress Note    Patient:  Timi Vega      Unit/Bed:[facility] OR (General) POOL R*    YOB: 1995    MRN: [id number]       Acct: [de-identified]     PCP: Chase Cantu MD    Date of Admission: 3/25/2020    Active Hospital Problems    Diagnosis Date Noted    UTI (urinary tract infection) [N39.0] 03/25/2020     Assessment/Plan:    1. Intractable Left Flank Pain: Left ureteral stone, left kidney stone S/P cystoscopy, left sided ureteroscopy with holmium laser lithotripsy, left sided stent exchange on 3/21/2020. Continues to have flank pain. Urology consulted. Plan for stent removal today. 2. UTI (POA): Staph hominis--Cipro 3/24 (S); Cipro is Class C pregnancy class   3. 29 weeks gestation: consult OB   4. Hypokalemia: replace per protocol  5. Chronic normocytic anemia: able     Dispo: plan for stent removal today. If pain is more tolerable by tomorrow will discharge home.      Chief Complaint: Left-sided flank and abdominal pain    Hospital Course: 27 y.o. female who presented to [facility] with left-sided flank and abdominal pain; patient was admitted on March 20, 2020 secondary to left flank pain; she has an extensive history of renal stones; she was seen by urology secondary to her left UPJ stone and ureteral stent; on March 21, 2020 she had a cystoscopy, left-sided ureteroscopy with holmium laser lithotripsy and a left-sided stent exchange; she was also being treated for UTI and her culture came back growing staph hominis she was giving a dose of vancomycin and was transitioned to Cipro for discharge; she was feeling well and was discharged yesterday; she states about 4:00 this morning she woke up with left-sided upper quadrant pain that radiated to her back, rated 7 out of 10 and states that sharp and stabbing; she is 29 to 30 weeks pregnant, her due date is Abbi 3, she follows with Dr. Cara White; she has a history of kidney stones; she quit vaping 1 month ago and quit smoking marijuana 2 weeks ago; she presented to the ER secondary to uncontrolled pain with the Percocet that she had at home; she stated that she had some nausea earlier but no vomiting; he states she has had urinary frequency for approximately 1 month now; she states she also had some lightheadedness this morning; she is afebrile; renal ultrasound showed a left-sided ureteral stent, mild residual caliectasis/hydronephrotic changes of the left kidney, and left-sided renal calculi; since her pain is uncontrolled she is being admitted to the hospital service for further care and evaluation. Subjective: no acute events overnight. No fevers or chills. Pain more tolerable. No fevers or chills. No nausea or vomiting. No burning with urination. Medications:  Reviewed    Infusion Medications    sodium chloride 75 mL/hr at 03/26/20 0128     Scheduled Medications    ferrous sulfate  325 mg Oral BID WC    lactobacillus  1 capsule Oral Daily    sodium chloride flush  10 mL Intravenous 2 times per day    ciprofloxacin  400 mg Intravenous Q12H    docusate sodium  100 mg Oral Daily     PRN Meds: oxybutynin, oxyCODONE-acetaminophen, sodium chloride flush, acetaminophen **OR** acetaminophen, ondansetron, polyethylene glycol      Intake/Output Summary (Last 24 hours) at 3/26/2020 1223  Last data filed at 3/26/2020 1154  Gross per 24 hour   Intake 4076.63 ml   Output 2125 ml   Net 1951.63 ml       Diet:  Diet NPO, After Midnight    Exam:  /66   Pulse 105   Temp 98.2 °F (36.8 °C) (Temporal)   Resp 15   Ht 5' 1\" (1.549 m)   Wt 161 lb (73 kg)   LMP 08/26/2019 Comment: 26 WEEKS PREGNANT  SpO2 90%   BMI 30.42 kg/m²     General appearance: No apparent distress, appears stated age and cooperative. HEENT: Pupils equal, round, and reactive to light. Conjunctivae/corneas clear. Neck: Supple, with full range of motion. No jugular venous distention. Trachea midline. Respiratory:  Normal respiratory effort.  Clear

## 2020-03-27 ENCOUNTER — TELEPHONE (OUTPATIENT)
Dept: FAMILY MEDICINE CLINIC | Age: 25
End: 2020-03-27

## 2020-03-27 VITALS
RESPIRATION RATE: 16 BRPM | SYSTOLIC BLOOD PRESSURE: 103 MMHG | TEMPERATURE: 98.2 F | HEIGHT: 61 IN | OXYGEN SATURATION: 97 % | DIASTOLIC BLOOD PRESSURE: 61 MMHG | WEIGHT: 158.7 LBS | BODY MASS INDEX: 29.96 KG/M2 | HEART RATE: 76 BPM

## 2020-03-27 LAB
ANION GAP SERPL CALCULATED.3IONS-SCNC: 11 MEQ/L (ref 8–16)
BASOPHILS # BLD: 0.1 %
BASOPHILS ABSOLUTE: 0 THOU/MM3 (ref 0–0.1)
BUN BLDV-MCNC: 3 MG/DL (ref 7–22)
CALCIUM SERPL-MCNC: 7.8 MG/DL (ref 8.5–10.5)
CHLORIDE BLD-SCNC: 108 MEQ/L (ref 98–111)
CO2: 20 MEQ/L (ref 23–33)
CREAT SERPL-MCNC: 0.4 MG/DL (ref 0.4–1.2)
EOSINOPHIL # BLD: 1.9 %
EOSINOPHILS ABSOLUTE: 0.1 THOU/MM3 (ref 0–0.4)
ERYTHROCYTE [DISTWIDTH] IN BLOOD BY AUTOMATED COUNT: 12.3 % (ref 11.5–14.5)
ERYTHROCYTE [DISTWIDTH] IN BLOOD BY AUTOMATED COUNT: 39.8 FL (ref 35–45)
GFR SERPL CREATININE-BSD FRML MDRD: > 90 ML/MIN/1.73M2
GLUCOSE BLD-MCNC: 94 MG/DL (ref 70–108)
HCT VFR BLD CALC: 24.6 % (ref 37–47)
HEMOGLOBIN: 8.4 GM/DL (ref 12–16)
IMMATURE GRANS (ABS): 0.03 THOU/MM3 (ref 0–0.07)
IMMATURE GRANULOCYTES: 0.4 %
LYMPHOCYTES # BLD: 30.2 %
LYMPHOCYTES ABSOLUTE: 2.1 THOU/MM3 (ref 1–4.8)
MAGNESIUM: 1.6 MG/DL (ref 1.6–2.4)
MCH RBC QN AUTO: 30.4 PG (ref 26–33)
MCHC RBC AUTO-ENTMCNC: 34.1 GM/DL (ref 32.2–35.5)
MCV RBC AUTO: 89.1 FL (ref 81–99)
MONOCYTES # BLD: 8.4 %
MONOCYTES ABSOLUTE: 0.6 THOU/MM3 (ref 0.4–1.3)
NUCLEATED RED BLOOD CELLS: 0 /100 WBC
PLATELET # BLD: 163 THOU/MM3 (ref 130–400)
PMV BLD AUTO: 10.9 FL (ref 9.4–12.4)
POTASSIUM SERPL-SCNC: 3.2 MEQ/L (ref 3.5–5.2)
RBC # BLD: 2.76 MILL/MM3 (ref 4.2–5.4)
SEG NEUTROPHILS: 59 %
SEGMENTED NEUTROPHILS ABSOLUTE COUNT: 4 THOU/MM3 (ref 1.8–7.7)
SODIUM BLD-SCNC: 139 MEQ/L (ref 135–145)
WBC # BLD: 6.8 THOU/MM3 (ref 4.8–10.8)

## 2020-03-27 PROCEDURE — 85025 COMPLETE CBC W/AUTO DIFF WBC: CPT

## 2020-03-27 PROCEDURE — 94760 N-INVAS EAR/PLS OXIMETRY 1: CPT

## 2020-03-27 PROCEDURE — 6370000000 HC RX 637 (ALT 250 FOR IP): Performed by: NURSE PRACTITIONER

## 2020-03-27 PROCEDURE — 6370000000 HC RX 637 (ALT 250 FOR IP): Performed by: UROLOGY

## 2020-03-27 PROCEDURE — 99231 SBSQ HOSP IP/OBS SF/LOW 25: CPT | Performed by: NURSE PRACTITIONER

## 2020-03-27 PROCEDURE — 83735 ASSAY OF MAGNESIUM: CPT

## 2020-03-27 PROCEDURE — 6360000002 HC RX W HCPCS: Performed by: UROLOGY

## 2020-03-27 PROCEDURE — 99239 HOSP IP/OBS DSCHRG MGMT >30: CPT | Performed by: INTERNAL MEDICINE

## 2020-03-27 PROCEDURE — 36415 COLL VENOUS BLD VENIPUNCTURE: CPT

## 2020-03-27 PROCEDURE — 2580000003 HC RX 258: Performed by: UROLOGY

## 2020-03-27 PROCEDURE — 80048 BASIC METABOLIC PNL TOTAL CA: CPT

## 2020-03-27 RX ORDER — POTASSIUM CHLORIDE 20 MEQ/1
40 TABLET, EXTENDED RELEASE ORAL ONCE
Status: COMPLETED | OUTPATIENT
Start: 2020-03-27 | End: 2020-03-27

## 2020-03-27 RX ADMIN — FERROUS SULFATE TAB 325 MG (65 MG ELEMENTAL FE) 325 MG: 325 (65 FE) TAB at 09:51

## 2020-03-27 RX ADMIN — CIPROFLOXACIN 400 MG: 2 INJECTION, SOLUTION INTRAVENOUS at 12:12

## 2020-03-27 RX ADMIN — SODIUM CHLORIDE: 9 INJECTION, SOLUTION INTRAVENOUS at 10:46

## 2020-03-27 RX ADMIN — POTASSIUM CHLORIDE 40 MEQ: 1500 TABLET, EXTENDED RELEASE ORAL at 07:22

## 2020-03-27 RX ADMIN — DOCUSATE SODIUM 100 MG: 100 CAPSULE, LIQUID FILLED ORAL at 09:51

## 2020-03-27 RX ADMIN — Medication 1 CAPSULE: at 12:44

## 2020-03-27 ASSESSMENT — PAIN SCALES - WONG BAKER
WONGBAKER_NUMERICALRESPONSE: 0

## 2020-03-27 ASSESSMENT — PAIN SCALES - GENERAL: PAINLEVEL_OUTOF10: 0

## 2020-03-27 NOTE — PROGRESS NOTES
[address] [facility] [address]  [address] [facility] [address]  Dept: [phone]  Loc: [phone]  Visit Date: 3/25/2020  Urology Progress Note    Chief Complaint: Left flank pain    Subjective: \"I feel great today. \"  Vitals stable overnight. Feels great. Denies pain. Urinating spontaneously without difficulty. Notes some stone sediment in urine. Denies dysuria, hematuria. Wants to go home.         Vitals:  /73   Pulse 69   Temp 98 °F (36.7 °C) (Oral)   Resp 20   Ht 5' 1\" (1.549 m)   Wt 158 lb 11.2 oz (72 kg)   LMP 2019 Comment: 26 WEEKS PREGNANT  SpO2 99%   BMI 29.99 kg/m²   Temp  Av.3 °F (36.8 °C)  Min: 98 °F (36.7 °C)  Max: 98.5 °F (36.9 °C)    Intake/Output Summary (Last 24 hours) at 3/27/2020 1021  Last data filed at 3/27/2020 0334  Gross per 24 hour   Intake 4010.81 ml   Output 1150 ml   Net 2860.81 ml       Social History     Socioeconomic History    Marital status: Single     Spouse name: Not on file    Number of children: Not on file    Years of education: Not on file    Highest education level: Not on file   Occupational History    Not on file   Social Needs    Financial resource strain: Not on file    Food insecurity     Worry: Not on file     Inability: Not on file    Transportation needs     Medical: Not on file     Non-medical: Not on file   Tobacco Use    Smoking status: Former Smoker     Types: Cigarettes     Last attempt to quit: 2019     Years since quittin.3    Smokeless tobacco: Never Used    Tobacco comment: Vape   Substance and Sexual Activity    Alcohol use: No    Drug use: Yes     Types: Marijuana     Comment: LAST USED 2020    Sexual activity: Yes     Partners: Male   Lifestyle    Physical activity     Days per week: Not on file     Minutes per session: Not on file    Stress: Not on file   Relationships    Social connections     Talks on phone: Not on file     Gets together: Not on file Attends [employer] service: Not on file     Active member of club or organization: Not on file     Attends meetings of clubs or organizations: Not on file     Relationship status: Not on file    Intimate partner violence     Fear of current or ex partner: Not on file     Emotionally abused: Not on file     Physically abused: Not on file     Forced sexual activity: Not on file   Other Topics Concern    Not on file   Social History Narrative    Not on file     Family History   Problem Relation Age of Onset    Heart Disease Father     Heart Disease Paternal Grandfather      No Known Allergies    Objective:    Constitutional: Alert and oriented times x3, no acute distress, and cooperative to examination with appropriate mood and affect. HEENT:   Head:         Normocephalic and atraumatic. Mucous membranes are normal.   Eyes:         EOM are normal. No scleral icterus. Nose:    The external appearance of the nose is normal  Ears: The ears appear normal to external inspection. Cardiovascular:       Normal rate, regular rhythm. Pulmonary/Chest:  Normal respiratory rate and rhthym. No use of accessory muscles. Lungs clear bilaterally. Abdominal:          Soft. No tenderness. Active bowel sounds. Musculoskeletal:    Normal range of motion. She exhibits no edema or tenderness of lower extremities. Extremities:    No cyanosis, clubbing, or edema present. Neurological:    Alert and oriented.      Labs:  WBC:    Lab Results   Component Value Date    WBC 6.8 03/27/2020     Hemoglobin/Hematocrit:    Lab Results   Component Value Date    HGB 8.4 03/27/2020    HCT 24.6 03/27/2020     BMP:    Lab Results   Component Value Date     03/27/2020    K 3.2 03/27/2020    K 3.3 03/26/2020     03/27/2020    CO2 20 03/27/2020    BUN 3 03/27/2020    LABALBU 3.3 03/20/2020    CREATININE 0.4 03/27/2020    CALCIUM 7.8 03/27/2020    LABGLOM >90 03/27/2020       Impression:  Left flank pain  Ureteral

## 2020-03-27 NOTE — TELEPHONE ENCOUNTER
Patient was in the hospital for a stent removal from kidney. She needs to be followed up in one week. 36% for readmission score. Please contact patient to schedule appt.

## 2020-03-27 NOTE — DISCHARGE SUMMARY
TempSrc: Oral Oral  Oral   SpO2: 98% 98% 99% 97%   Weight: 158 lb 11.2 oz (72 kg)      Height:         Weight: Weight: 158 lb 11.2 oz (72 kg)     24 hour intake/output:    Intake/Output Summary (Last 24 hours) at 3/27/2020 1215  Last data filed at 3/27/2020 0334  Gross per 24 hour   Intake 3010.81 ml   Output 1150 ml   Net 1860.81 ml         Labs: For convenience and continuity at follow-up the following most recent labs are provided:      CBC:    Lab Results   Component Value Date    WBC 6.8 03/27/2020    HGB 8.4 03/27/2020    HCT 24.6 03/27/2020     03/27/2020       Renal:    Lab Results   Component Value Date     03/27/2020    K 3.2 03/27/2020    K 3.3 03/26/2020     03/27/2020    CO2 20 03/27/2020    BUN 3 03/27/2020    CREATININE 0.4 03/27/2020    CALCIUM 7.8 03/27/2020         Significant Diagnostic Studies    Radiology:   US RENAL COMPLETE   Final Result       1. Left-sided ureteral stent. 2. Mild residual caliectasis/hydronephrotic changes of the left kidney. 3. Left-sided renal calculi. **This report has been created using voice recognition software. It may contain minor errors which are inherent in voice recognition technology. **      Final report electronically signed by Dr. Kathern Cogan on 3/25/2020 7:06 AM             Consults:     [facility] [place] TO OB GYN    Disposition:    [x] Home       [] TCU       [] Rehab       [] Psych       [] SNF       [] [place]       [] Other-    Condition at Discharge: Stable    Code Status:  Full Code     Patient Instructions:     Activity: activity as tolerated  Diet: DIET GENERAL;      Follow-up visits:   Judy Barrientos MD  [address] [place] [address] [address] [address]  [address] [facility], [address] [place] [id number]  [place] , [facility] [phone]      follow up with Dr Monie Coleman in 2-3 months         Discharge Medications:

## 2020-03-30 ENCOUNTER — TELEPHONE (OUTPATIENT)
Dept: FAMILY MEDICINE CLINIC | Age: 25
End: 2020-03-30

## 2020-03-30 NOTE — TELEPHONE ENCOUNTER
[facility] Transitions Initial Follow Up Call    Outreach made within 2 business days of discharge: Yes    Patient: Deion Live Patient : 1995   MRN: [id number]  Reason for Admission: There are no discharge diagnoses documented for the most recent discharge. Discharge Date: 3/27/20       Spoke with: left message for pt to call the office.     Discharge department/facility: [facility]        Scheduled appointment with PCP within 7-14 days    Follow Up  Future Appointments   Date Time Provider Jamal Weiss   2020  1:30 PM STR ULTRASOUND RM 2 [facility]Z US STR Radiolog   2020  2:30 PM Fernie Ron MD [facility] [place]

## 2020-03-31 ASSESSMENT — ENCOUNTER SYMPTOMS
BACK PAIN: 0
COUGH: 0
VOMITING: 1
RHINORRHEA: 0
NAUSEA: 1
CHEST TIGHTNESS: 0

## 2020-03-31 NOTE — ED PROVIDER NOTES
which are inherent in voice recognition technology. **      Final report electronically signed by Dr. Rosio Holbrook on 3/25/2020 7:06 AM            LABS:   Labs Reviewed   CULTURE, REFLEXED, URINE - Abnormal; Notable for the following components:       Result Value    Urine Culture Reflex   (*)     Value: Growth of Contaminants. The mixture of organisms present, which includes Candida albicans, are not a common cause of urinary tract infections and probably represent skin carol or distal urethral carol.      Organism Growth of Contaminants    (*)     All other components within normal limits    Narrative:     Source: urine, clean catch       Site:           Current Antibiotics: not stated   CBC WITH AUTO DIFFERENTIAL - Abnormal; Notable for the following components:    RBC 3.47 (*)     Hemoglobin 10.6 (*)     Hematocrit 32.7 (*)     All other components within normal limits   BASIC METABOLIC PANEL - Abnormal; Notable for the following components:    Potassium 3.4 (*)     CO2 21 (*)     BUN 5 (*)     All other components within normal limits   URINE WITH REFLEXED MICRO - Abnormal; Notable for the following components:    Blood, Urine MODERATE (*)     Protein, UA 30 (*)     Leukocyte Esterase, Urine MODERATE (*)     Character, Urine CLOUDY (*)     All other components within normal limits   BASIC METABOLIC PANEL W/ REFLEX TO MG FOR LOW K - Abnormal; Notable for the following components:    Potassium reflex Magnesium 3.3 (*)     CO2 22 (*)     BUN 3 (*)     Calcium 7.9 (*)     All other components within normal limits   CBC - Abnormal; Notable for the following components:    RBC 2.95 (*)     Hemoglobin 9.1 (*)     Hematocrit 28.1 (*)     All other components within normal limits   MAGNESIUM - Abnormal; Notable for the following components:    Magnesium 1.5 (*)     All other components within normal limits   BASIC METABOLIC PANEL - Abnormal; Notable for the following components:    Potassium 3.2 (*)     CO2 20 (*) were completed with a voice recognition program.  Efforts were made to edit thedictations but occasionally words are mis-transcribed.)    LEV Gruber - LEV Maravilla CNP  03/31/20 9099

## 2020-04-12 NOTE — PROCEDURES
Department of Obstetrics and Gynecology  Labor and Delivery   Triage Note        Pt Name: Jeannette Major  MRN: [id number] Tata #: [de-identified]  YOB: 1995      SUBJECTIVE: This patient presented at 31 weeks gestation complaining of flank pain with known kidney stone. OBJECTIVE    Vitals:  /61   Pulse 76   Temp 98.2 °F (36.8 °C) (Oral)   Resp 16   Ht 5' 1\" (1.549 m)   Wt 158 lb 11.2 oz (72 kg)   LMP 08/26/2019 Comment: 26 WEEKS PREGNANT  SpO2 97%   BMI 29.99 kg/m²     Fetal heart rate:         Baseline Heart Rate:  130        Accelerations:  present       Decelerations:  none       Variability:  moderate    Contraction frequency: none    The NST was reactive        ASSESSMENT & PLAN:  Discharged to home in a stable condition and instructed to follow up at her next scheduled appointment. [address] [facility].

## 2020-04-21 ENCOUNTER — TELEPHONE (OUTPATIENT)
Dept: UROLOGY | Age: 25
End: 2020-04-21

## 2020-04-21 RX ORDER — FERROUS SULFATE 325(65) MG
325 TABLET ORAL 2 TIMES DAILY
Qty: 60 TABLET | Refills: 3 | Status: SHIPPED | OUTPATIENT
Start: 2020-04-21 | End: 2020-06-23

## 2020-04-21 RX ORDER — FERROUS SULFATE 325(65) MG
TABLET ORAL
Qty: 60 TABLET | Refills: 0 | OUTPATIENT
Start: 2020-04-21

## 2020-04-21 NOTE — TELEPHONE ENCOUNTER
Stent is out  Nilsagiacomo Becker was treated  Has she seen her OB? ??  She is Sharif's pt, however, Mostafa remover her stent  We can see if she can follow up with Dr. Valeria Le sooner? ?  If her pain is really severe, she may need to be evaluated in the ER    Thanks  Bang Juárez

## 2020-04-24 PROBLEM — N39.0 UTI (URINARY TRACT INFECTION): Status: RESOLVED | Noted: 2020-03-25 | Resolved: 2020-04-24

## 2020-04-24 NOTE — PROCEDURES
Department of Obstetrics and Gynecology  Labor and Delivery   Triage Note  Manuela Abad D.O.      Pt Name: Nay Allen  MRN: [id number] Kimnesha #: [de-identified]  YOB: 1995  Date of Service 3/26/20    SUBJECTIVE: This 22yo  patient presented at 30 week+1 complaining of flank pain and was found to have pylonephritis. OBJECTIVE    Fetal heart rate:         Baseline Heart Rate:  140        Accelerations:  present       Decelerations:  none       Variability:  moderate    Contraction frequency: nil    The NST was reactive level 1      ASSESSMENT & PLAN:  Discharged to home in a stable condition and instructed to follow up at her next scheduled appointment. Gill RUSSELL

## 2020-05-05 ENCOUNTER — HOSPITAL ENCOUNTER (OUTPATIENT)
Age: 25
Discharge: HOME | End: 2020-05-05
Attending: OBSTETRICS & GYNECOLOGY | Admitting: OBSTETRICS & GYNECOLOGY
Payer: COMMERCIAL

## 2020-05-05 VITALS
WEIGHT: 160 LBS | HEART RATE: 90 BPM | DIASTOLIC BLOOD PRESSURE: 56 MMHG | SYSTOLIC BLOOD PRESSURE: 105 MMHG | BODY MASS INDEX: 30.21 KG/M2 | TEMPERATURE: 97.2 F | HEIGHT: 61 IN | RESPIRATION RATE: 16 BRPM | OXYGEN SATURATION: 98 %

## 2020-05-05 PROBLEM — O47.9 FALSE LABOR: Status: ACTIVE | Noted: 2020-05-05

## 2020-05-05 PROCEDURE — U0002 COVID-19 LAB TEST NON-CDC: HCPCS

## 2020-05-05 ASSESSMENT — PAIN DESCRIPTION - DESCRIPTORS: DESCRIPTORS: CRAMPING

## 2020-05-05 NOTE — FLOWSHEET NOTE
Dr. Jim Segovia called back and told her of  3 para 1 with due date of 2020 36 1/7 weeks gestation and came in because of contractions every 3 minutes while on NST in the office. Told Dr. Jim Segovia that the patient is fernanda on admission every 3-4.5 minutes apart with closed cervix and of 30 percent effaced and soft and cephalic and ballotable   Told Dr. Jim Segovia of reactive strip with numerous accels and of a couple of variable decels and moderate variability. Told Dr. Jim Segovia of normal vital signs and of patient drinking 3-4 8 ounce glasses of water today. Orders taken. Orders for discharge.

## 2020-05-05 NOTE — FLOWSHEET NOTE
3 para 1 with due date of 2020 and patient says that changed it to 2020 around  when had last ultrasound. States was having contractions on the fetal monitor in the office every 3 minutes so they told her to come in. Changing clothes in room 5c05 and instructed can use bathroom to void in a cup for in case order for the urine. Denies water being broke and states not really feeling the contractions when on the monitor.

## 2020-05-08 ENCOUNTER — HOSPITAL ENCOUNTER (OUTPATIENT)
Age: 25
Discharge: HOME | End: 2020-05-08
Payer: COMMERCIAL

## 2020-05-11 LAB
PERFORMING LAB: NORMAL
REPORT: NORMAL
SARS-COV-2: NOT DETECTED

## 2020-05-14 ENCOUNTER — ANESTHESIA (OUTPATIENT)
Dept: LABOR AND DELIVERY | Age: 25
End: 2020-05-14
Payer: COMMERCIAL

## 2020-05-14 ENCOUNTER — APPOINTMENT (OUTPATIENT)
Dept: LABOR AND DELIVERY | Age: 25
End: 2020-05-14
Payer: COMMERCIAL

## 2020-05-14 ENCOUNTER — ANESTHESIA EVENT (OUTPATIENT)
Dept: LABOR AND DELIVERY | Age: 25
End: 2020-05-14
Payer: COMMERCIAL

## 2020-05-14 ENCOUNTER — HOSPITAL ENCOUNTER (INPATIENT)
Age: 25
LOS: 2 days | Discharge: HOME | End: 2020-05-16
Attending: OBSTETRICS & GYNECOLOGY | Admitting: OBSTETRICS & GYNECOLOGY
Payer: COMMERCIAL

## 2020-05-14 ENCOUNTER — APPOINTMENT (OUTPATIENT)
Dept: ULTRASOUND IMAGING | Age: 25
End: 2020-05-14
Payer: COMMERCIAL

## 2020-05-14 LAB
ABO: NORMAL
AMPHETAMINE+METHAMPHETAMINE URINE SCREEN: NEGATIVE
ANTIBODY SCREEN: NORMAL
BARBITURATE QUANTITATIVE URINE: NEGATIVE
BASOPHILS # BLD: 0.4 %
BASOPHILS ABSOLUTE: 0 THOU/MM3 (ref 0–0.1)
BENZODIAZEPINE QUANTITATIVE URINE: NEGATIVE
CANNABINOID QUANTITATIVE URINE: NEGATIVE
COCAINE METABOLITE QUANTITATIVE URINE: NEGATIVE
EOSINOPHIL # BLD: 0.3 %
EOSINOPHILS ABSOLUTE: 0 THOU/MM3 (ref 0–0.4)
ERYTHROCYTE [DISTWIDTH] IN BLOOD BY AUTOMATED COUNT: 13.2 % (ref 11.5–14.5)
ERYTHROCYTE [DISTWIDTH] IN BLOOD BY AUTOMATED COUNT: 42.6 FL (ref 35–45)
HCT VFR BLD CALC: 34.1 % (ref 37–47)
HEMOGLOBIN: 11.2 GM/DL (ref 12–16)
IMMATURE GRANS (ABS): 0.08 THOU/MM3 (ref 0–0.07)
IMMATURE GRANULOCYTES: 0.9 %
LYMPHOCYTES # BLD: 27 %
LYMPHOCYTES ABSOLUTE: 2.5 THOU/MM3 (ref 1–4.8)
MCH RBC QN AUTO: 29.1 PG (ref 26–33)
MCHC RBC AUTO-ENTMCNC: 32.8 GM/DL (ref 32.2–35.5)
MCV RBC AUTO: 88.6 FL (ref 81–99)
MONOCYTES # BLD: 7.5 %
MONOCYTES ABSOLUTE: 0.7 THOU/MM3 (ref 0.4–1.3)
NUCLEATED RED BLOOD CELLS: 0 /100 WBC
OPIATES, URINE: NEGATIVE
OXYCODONE: NEGATIVE
PHENCYCLIDINE QUANTITATIVE URINE: NEGATIVE
PLATELET # BLD: 199 THOU/MM3 (ref 130–400)
PMV BLD AUTO: 11.5 FL (ref 9.4–12.4)
RBC # BLD: 3.85 MILL/MM3 (ref 4.2–5.4)
RH FACTOR: NORMAL
RPR: NONREACTIVE
SEG NEUTROPHILS: 63.9 %
SEGMENTED NEUTROPHILS ABSOLUTE COUNT: 5.8 THOU/MM3 (ref 1.8–7.7)
WBC # BLD: 9.1 THOU/MM3 (ref 4.8–10.8)

## 2020-05-14 PROCEDURE — 86592 SYPHILIS TEST NON-TREP QUAL: CPT

## 2020-05-14 PROCEDURE — 3700000025 EPIDURAL BLOCK: Performed by: ANESTHESIOLOGY

## 2020-05-14 PROCEDURE — 6360000002 HC RX W HCPCS: Performed by: OBSTETRICS & GYNECOLOGY

## 2020-05-14 PROCEDURE — 86901 BLOOD TYPING SEROLOGIC RH(D): CPT

## 2020-05-14 PROCEDURE — 10907ZC DRAINAGE OF AMNIOTIC FLUID, THERAPEUTIC FROM PRODUCTS OF CONCEPTION, VIA NATURAL OR ARTIFICIAL OPENING: ICD-10-PCS | Performed by: OBSTETRICS & GYNECOLOGY

## 2020-05-14 PROCEDURE — 80305 DRUG TEST PRSMV DIR OPT OBS: CPT

## 2020-05-14 PROCEDURE — 2580000003 HC RX 258: Performed by: OBSTETRICS & GYNECOLOGY

## 2020-05-14 PROCEDURE — 85025 COMPLETE CBC W/AUTO DIFF WBC: CPT

## 2020-05-14 PROCEDURE — 6360000002 HC RX W HCPCS

## 2020-05-14 PROCEDURE — 2709999900 HC NON-CHARGEABLE SUPPLY

## 2020-05-14 PROCEDURE — 36415 COLL VENOUS BLD VENIPUNCTURE: CPT

## 2020-05-14 PROCEDURE — 86900 BLOOD TYPING SEROLOGIC ABO: CPT

## 2020-05-14 PROCEDURE — 86850 RBC ANTIBODY SCREEN: CPT

## 2020-05-14 PROCEDURE — 2580000003 HC RX 258

## 2020-05-14 PROCEDURE — 3E033VJ INTRODUCTION OF OTHER HORMONE INTO PERIPHERAL VEIN, PERCUTANEOUS APPROACH: ICD-10-PCS | Performed by: OBSTETRICS & GYNECOLOGY

## 2020-05-14 PROCEDURE — 1220000001 HC SEMI PRIVATE L&D R&B

## 2020-05-14 PROCEDURE — 76815 OB US LIMITED FETUS(S): CPT

## 2020-05-14 RX ORDER — DIPHENHYDRAMINE HYDROCHLORIDE 50 MG/ML
25 INJECTION INTRAMUSCULAR; INTRAVENOUS EVERY 4 HOURS PRN
Status: DISCONTINUED | OUTPATIENT
Start: 2020-05-14 | End: 2020-05-15 | Stop reason: HOSPADM

## 2020-05-14 RX ORDER — PENICILLIN G 3000000 [IU]/50ML
3 INJECTION, SOLUTION INTRAVENOUS EVERY 4 HOURS
Status: DISCONTINUED | OUTPATIENT
Start: 2020-05-14 | End: 2020-05-15 | Stop reason: HOSPADM

## 2020-05-14 RX ORDER — SODIUM CHLORIDE 0.9 % (FLUSH) 0.9 %
10 SYRINGE (ML) INJECTION EVERY 12 HOURS SCHEDULED
Status: DISCONTINUED | OUTPATIENT
Start: 2020-05-14 | End: 2020-05-15 | Stop reason: HOSPADM

## 2020-05-14 RX ORDER — MORPHINE SULFATE 2 MG/ML
2 INJECTION, SOLUTION INTRAMUSCULAR; INTRAVENOUS
Status: DISCONTINUED | OUTPATIENT
Start: 2020-05-14 | End: 2020-05-15 | Stop reason: ALTCHOICE

## 2020-05-14 RX ORDER — SEVOFLURANE 250 ML/250ML
1 LIQUID RESPIRATORY (INHALATION) CONTINUOUS PRN
Status: DISCONTINUED | OUTPATIENT
Start: 2020-05-14 | End: 2020-05-15 | Stop reason: HOSPADM

## 2020-05-14 RX ORDER — BUTORPHANOL TARTRATE 1 MG/ML
1 INJECTION, SOLUTION INTRAMUSCULAR; INTRAVENOUS
Status: DISCONTINUED | OUTPATIENT
Start: 2020-05-14 | End: 2020-05-15 | Stop reason: HOSPADM

## 2020-05-14 RX ORDER — SODIUM CHLORIDE 0.9 % (FLUSH) 0.9 %
10 SYRINGE (ML) INJECTION PRN
Status: DISCONTINUED | OUTPATIENT
Start: 2020-05-14 | End: 2020-05-15 | Stop reason: HOSPADM

## 2020-05-14 RX ORDER — IBUPROFEN 800 MG/1
800 TABLET ORAL EVERY 8 HOURS PRN
Status: DISCONTINUED | OUTPATIENT
Start: 2020-05-14 | End: 2020-05-15 | Stop reason: ALTCHOICE

## 2020-05-14 RX ORDER — MISOPROSTOL 200 UG/1
1000 TABLET ORAL PRN
Status: DISCONTINUED | OUTPATIENT
Start: 2020-05-14 | End: 2020-05-16 | Stop reason: HOSPADM

## 2020-05-14 RX ORDER — SODIUM CHLORIDE, SODIUM LACTATE, POTASSIUM CHLORIDE, CALCIUM CHLORIDE 600; 310; 30; 20 MG/100ML; MG/100ML; MG/100ML; MG/100ML
INJECTION, SOLUTION INTRAVENOUS CONTINUOUS
Status: DISCONTINUED | OUTPATIENT
Start: 2020-05-14 | End: 2020-05-15

## 2020-05-14 RX ORDER — ACETAMINOPHEN 325 MG/1
650 TABLET ORAL EVERY 4 HOURS PRN
Status: DISCONTINUED | OUTPATIENT
Start: 2020-05-14 | End: 2020-05-15 | Stop reason: HOSPADM

## 2020-05-14 RX ORDER — LIDOCAINE HYDROCHLORIDE 10 MG/ML
30 INJECTION, SOLUTION EPIDURAL; INFILTRATION; INTRACAUDAL; PERINEURAL PRN
Status: DISCONTINUED | OUTPATIENT
Start: 2020-05-14 | End: 2020-05-16 | Stop reason: HOSPADM

## 2020-05-14 RX ORDER — CARBOPROST TROMETHAMINE 250 UG/ML
250 INJECTION, SOLUTION INTRAMUSCULAR PRN
Status: CANCELLED | OUTPATIENT
Start: 2020-05-14

## 2020-05-14 RX ORDER — ONDANSETRON 2 MG/ML
8 INJECTION INTRAMUSCULAR; INTRAVENOUS EVERY 6 HOURS PRN
Status: DISCONTINUED | OUTPATIENT
Start: 2020-05-14 | End: 2020-05-15 | Stop reason: HOSPADM

## 2020-05-14 RX ORDER — NALBUPHINE HCL 10 MG/ML
5 AMPUL (ML) INJECTION EVERY 4 HOURS PRN
Status: DISCONTINUED | OUTPATIENT
Start: 2020-05-14 | End: 2020-05-15 | Stop reason: HOSPADM

## 2020-05-14 RX ORDER — MORPHINE SULFATE 4 MG/ML
4 INJECTION, SOLUTION INTRAMUSCULAR; INTRAVENOUS
Status: DISCONTINUED | OUTPATIENT
Start: 2020-05-14 | End: 2020-05-15 | Stop reason: ALTCHOICE

## 2020-05-14 RX ORDER — NALOXONE HYDROCHLORIDE 0.4 MG/ML
0.4 INJECTION, SOLUTION INTRAMUSCULAR; INTRAVENOUS; SUBCUTANEOUS PRN
Status: DISCONTINUED | OUTPATIENT
Start: 2020-05-14 | End: 2020-05-15 | Stop reason: HOSPADM

## 2020-05-14 RX ORDER — ONDANSETRON 2 MG/ML
4 INJECTION INTRAMUSCULAR; INTRAVENOUS EVERY 6 HOURS PRN
Status: DISCONTINUED | OUTPATIENT
Start: 2020-05-14 | End: 2020-05-15 | Stop reason: HOSPADM

## 2020-05-14 RX ORDER — METHYLERGONOVINE MALEATE 0.2 MG/ML
200 INJECTION INTRAVENOUS PRN
Status: DISCONTINUED | OUTPATIENT
Start: 2020-05-14 | End: 2020-05-16 | Stop reason: HOSPADM

## 2020-05-14 RX ORDER — TERBUTALINE SULFATE 1 MG/ML
0.25 INJECTION, SOLUTION SUBCUTANEOUS
Status: ACTIVE | OUTPATIENT
Start: 2020-05-14 | End: 2020-05-14

## 2020-05-14 RX ADMIN — ONDANSETRON HYDROCHLORIDE 8 MG: 2 SOLUTION INTRAMUSCULAR; INTRAVENOUS at 19:29

## 2020-05-14 RX ADMIN — SODIUM CHLORIDE, POTASSIUM CHLORIDE, SODIUM LACTATE AND CALCIUM CHLORIDE: 600; 310; 30; 20 INJECTION, SOLUTION INTRAVENOUS at 14:56

## 2020-05-14 RX ADMIN — SODIUM CHLORIDE, POTASSIUM CHLORIDE, SODIUM LACTATE AND CALCIUM CHLORIDE: 600; 310; 30; 20 INJECTION, SOLUTION INTRAVENOUS at 08:20

## 2020-05-14 RX ADMIN — DEXTROSE MONOHYDRATE 5 MILLION UNITS: 5 INJECTION INTRAVENOUS at 09:29

## 2020-05-14 RX ADMIN — BUTORPHANOL TARTRATE 1 MG: 1 INJECTION, SOLUTION INTRAMUSCULAR; INTRAVENOUS at 18:05

## 2020-05-14 RX ADMIN — SODIUM CHLORIDE, POTASSIUM CHLORIDE, SODIUM LACTATE AND CALCIUM CHLORIDE: 600; 310; 30; 20 INJECTION, SOLUTION INTRAVENOUS at 20:03

## 2020-05-14 RX ADMIN — PENICILLIN G 3 MILLION UNITS: 3000000 INJECTION, SOLUTION INTRAVENOUS at 20:33

## 2020-05-14 RX ADMIN — PENICILLIN G 3 MILLION UNITS: 3000000 INJECTION, SOLUTION INTRAVENOUS at 13:55

## 2020-05-14 RX ADMIN — Medication 1 MILLI-UNITS/MIN: at 09:26

## 2020-05-14 RX ADMIN — PENICILLIN G 3 MILLION UNITS: 3000000 INJECTION, SOLUTION INTRAVENOUS at 17:51

## 2020-05-14 ASSESSMENT — PAIN DESCRIPTION - DESCRIPTORS
DESCRIPTORS: CRAMPING
DESCRIPTORS: CRAMPING

## 2020-05-14 ASSESSMENT — PAIN SCALES - GENERAL: PAINLEVEL_OUTOF10: 7

## 2020-05-14 NOTE — ANESTHESIA PRE PROCEDURE
injection 25 mg  25 mg Intravenous Q4H PRN [facility], DO        ondansetron [facility]) injection 8 mg  8 mg Intravenous Q6H PRN [facility], DO   8 mg at 05/14/20 1929    terbutaline (BRETHINE) injection 0.25 mg  0.25 mg Subcutaneous Once PRN [facility], DO        oxytocin (PITOCIN) 30 units in 500 mL infusion  1 cisco-units/min Intravenous Continuous PRN [facility], DO        methylergonovine (METHERGINE) injection 200 mcg  200 mcg Intramuscular PRN [facility], DO        misoprostol (CYTOTEC) tablet 1,000 mcg  1,000 mcg Rectal PRN [facility], DO        witch hazel-glycerin (TUCKS) pad   Topical PRN [facility], DO        benzocaine-menthol (DERMOPLAST) 20-0.5 % spray   Topical PRN [facility], DO        penicillin G potassium IVPB 3 Million Units  3 Million Units Intravenous Q4H [facility],  mL/hr at 05/14/20 1751 3 Million Units at 05/14/20 1751    fentaNYL 750 mcg, ropivacaine 0.1% in sodium chloride 0.9% 265mL (OB) epidural  18 mL/hr Epidural Continuous Natalia Silvius Gustavoitmeyer, DO        naloxone [facility]) injection 0.4 mg  0.4 mg Intravenous PRN Natalia Silvius Heitmeyer, DO        nalbuphine (NUBAIN) injection 5 mg  5 mg Intravenous Q4H PRN Natalia Silvius Heitmeyer, DO        ondansetron (ZOFRAN) injection 4 mg  4 mg Intravenous Q6H PRN Natalia Silvius Gustavoitmeyer, DO           Allergies:  No Known Allergies    Problem List:    Patient Active Problem List   Diagnosis Code    Renal colic W02    Oligohydramnios O41.00X0    Pyelonephritis, acute N10    Urinary tract obstruction by kidney stone N20.0, N13.8    13 weeks gestation of pregnancy Z3A.13    Left lower quadrant abdominal pain R10.32    Hydronephrosis of left kidney N13.30    Left ureteral stone N20.1    Nausea and vomiting R11.2    Leukocytosis D72.829    Normocytic anemia D64.9    Hypokalemia G87.4    Metabolic acidosis B14.3    Marijuana abuse F12.10    Kidney stone N20.0    Nephrolithiasis N20.0    Acute cystitis without hematuria N30.00    Pregnancy with 29 completed weeks gestation Z3A.29    False labor O47.9       Past Medical History:        Diagnosis Date    Diabetes mellitus ([address]     diet controlled    Kidney stone 2015    thinks left side and Dr. Eddie Munoz blasted it per patient    Kidney stone 2019    on the left side and 2 cms in size.     PONV (postoperative nausea and vomiting)     nausea    Postpartum depression        Past Surgical History:        Procedure Laterality Date    CYSTO/URETERO/PYELOSCOPY, CALCULUS TX Left 2019    CYSTOCOPY, LEFT STENT placement  performed by Cara Elkins MD at [place] [id number] Left 3/21/2020    CYSTOSCOPY, LEFT URETERAL STENT EXCHANGE performed by Adelita Buchanan MD at [address] / [address] / Anil Russ Left 2020    CYSTOSCOPY WITH LEFT URETERAL STENT EXCHANGE UNDER ULTRASOUND GUIDANCE performed by Cara Elkins MD at [address] / REMOVAL STENT / STONE N/A 3/2/2020    CYSTOSCOPY WITH ULTRASOUND GUIDED LEFT URETERAL STENT EXCHANGE performed by Cara Elkins MD at [address] / REMOVAL STENT / STONE Left 3/26/2020    CYSTOSCOPY, LEFT STENT REMOVAL performed by Dk Olmedo MD at [facility]  12/21/15    miscarriage    OTHER SURGICAL HISTORY  2015    Cystoscopy with right ureteroscopy basket stone removal stent placement (Dr. Latrice Ty, [facility])   [address] Bilateral     age 5 or 13    TYMPANOSTOMY TUBE PLACEMENT         Social History:    Social History     Tobacco Use    Smoking status: Former Smoker     Start date:      Last attempt to quit: 2019     Years since quittin.4    Smokeless tobacco: Never Used    Tobacco comment: Vape   Substance Use Topics    Alcohol use: Not Currently                                Counseling given: Not Answered  Comment: Vape      Vital

## 2020-05-14 NOTE — FLOWSHEET NOTE
Dr Aidee Summers to room and V/E done. 2 70% -2. AROM. No fluid seen.  FSE applied per Dr Aidee Summers

## 2020-05-14 NOTE — H&P
Wendy Adams is a 23 y.o. female patient. No diagnosis found. Past Medical History:   Diagnosis Date    Diabetes mellitus ([address]     diet controlled    Kidney stone 2015    thinks left side and Dr. Rigo Faye blasted it per patient    Kidney stone 2019    on the left side and 2 cms in size.  PONV (postoperative nausea and vomiting)     nausea    Postpartum depression      OB History        3    Para   1    Term   1       0    AB   1    Living   1       SAB   1    TAB   0    Ectopic   0    Molar   0    Multiple   0    Live Births   1              37w3d  Estimated Date of Delivery: 20  No Known Allergies  Active Problems:    * No active hospital problems. *  Resolved Problems:    * No resolved hospital problems. *    Blood pressure 118/85, pulse 68, temperature 97.2 °F (36.2 °C), resp. rate 16, height 5' (1.524 m), weight 160 lb (72.6 kg), last menstrual period 2019, unknown if currently breastfeeding. Maternal Medical History:   Reason for admission: IOL for GDM and SGA    Contractions: Frequency: regular. Fetal activity: Perceived fetal activity is normal.      Prenatal complications: IUGR. Prenatal Complications - Diabetes: gestational.  Diabetes is managed by diet. Maternal Exam:   Uterine Assessment: Contraction strength is mild. Contraction frequency is regular. Abdomen: Patient reports no abdominal tenderness. Fetal presentation: vertex    Introitus: Normal vulva. Normal vagina. Pelvis: adequate for delivery. Cervix: Cervix evaluated by digital exam.    2-3/70/-2 AROM clear    Fetal Exam  Fetal Monitor Review: Mode: ultrasound. Pattern: accelerations present. Fetal State Assessment: Category I - tracings are normal.          Assessment:  Early latent labor. Membrane status: AROM.    37 week gestation  SGA  GDMA1    Plan:  Admit to L&D  [place]  Epidural when desired  Anticipate ROSMERY Jimenez, DO  2020

## 2020-05-14 NOTE — ANESTHESIA PROCEDURE NOTES
Epidural Block    Patient location during procedure: OB  Start time: 5/14/2020 7:25 PM  End time: 5/14/2020 7:46 PM  Reason for block: labor epidural  Staffing  Anesthesiologist: Elise Goltz, DO  Performed: anesthesiologist   Preanesthetic Checklist  Completed: patient identified, site marked, surgical consent, pre-op evaluation, timeout performed, IV checked, risks and benefits discussed, monitors and equipment checked, anesthesia consent given, oxygen available and patient being monitored  Epidural  Patient position: sitting  Prep: ChloraPrep  Patient monitoring: continuous pulse ox and frequent blood pressure checks  Approach: midline  Location: lumbar (1-5)  Injection technique: AMBIKA saline  Provider prep: mask and sterile gloves  Needle  Needle type: Tuohy   Needle gauge: 18 G  Needle length: 3.5 in  Needle insertion depth: 6 cm  Catheter type: end hole  Catheter size: 19 G  Catheter at skin depth: 10 cm  Test dose: negative

## 2020-05-14 NOTE — PLAN OF CARE
Problem: Anxiety:  Goal: Level of anxiety will decrease  Description: Level of anxiety will decrease  Outcome: Ongoing  Note: Pt calm and cooperative, denies needs or complaints     Problem: Breathing Pattern - Ineffective:  Goal: Able to breathe comfortably  Description: Able to breathe comfortably  Outcome: Ongoing  Note: Color pink, resp with ease     Problem: Infection - Intrapartum Infection:  Goal: Will show no infection signs and symptoms  Description: Will show no infection signs and symptoms  Outcome: Ongoing  Note: Afebrile, no signs of infection     Problem: Labor Process - Prolonged:  Goal: Uterine contractions within specified parameters  Description: Uterine contractions within specified parameters  Outcome: Ongoing  Note: Having irregular contractions on her own, pitocin running     Problem: Pain - Acute:  Goal: Able to cope with pain  Description: Able to cope with pain  Outcome: Ongoing  Note: Planning epidural for pain, pain goal 6     Problem: Tissue Perfusion - Uteroplacental, Altered:  Goal: Absence of abnormal fetal heart rate pattern  Description: Absence of abnormal fetal heart rate pattern  Outcome: Ongoing  Note: Reactive FHR tracing     Problem: Urinary Retention:  Goal: Urinary elimination within specified parameters  Description: Urinary elimination within specified parameters  Outcome: Ongoing  Note: Voids freely, will place warren catheter after epidural     Problem: Falls - Risk of:  Goal: Will remain free from falls  Description: Will remain free from falls  Outcome: Ongoing  Note: No falls or injuries this shift     Problem: Discharge Planning:  Goal: Discharged to appropriate level of care  Description: Discharged to appropriate level of care  Outcome: Ongoing  Note: Plan to transfer to mother baby 2 hours after delivery for discharge teaching and follow up care  Care plan reviewed with patient and boyfriend  Patient and boyfriend verbalize understanding of the plan of care and contribute to goal setting.

## 2020-05-15 PROCEDURE — 6370000000 HC RX 637 (ALT 250 FOR IP): Performed by: OBSTETRICS & GYNECOLOGY

## 2020-05-15 PROCEDURE — 1220000000 HC SEMI PRIVATE OB R&B

## 2020-05-15 PROCEDURE — 2709999900 HC NON-CHARGEABLE SUPPLY

## 2020-05-15 PROCEDURE — 7200000001 HC VAGINAL DELIVERY

## 2020-05-15 RX ORDER — MORPHINE SULFATE 4 MG/ML
2 INJECTION, SOLUTION INTRAMUSCULAR; INTRAVENOUS
Status: DISCONTINUED | OUTPATIENT
Start: 2020-05-15 | End: 2020-05-16 | Stop reason: HOSPADM

## 2020-05-15 RX ORDER — SODIUM CHLORIDE 0.9 % (FLUSH) 0.9 %
10 SYRINGE (ML) INJECTION EVERY 12 HOURS SCHEDULED
Status: DISCONTINUED | OUTPATIENT
Start: 2020-05-15 | End: 2020-05-16 | Stop reason: HOSPADM

## 2020-05-15 RX ORDER — HYDROCODONE BITARTRATE AND ACETAMINOPHEN 5; 325 MG/1; MG/1
1 TABLET ORAL EVERY 4 HOURS PRN
Status: DISCONTINUED | OUTPATIENT
Start: 2020-05-15 | End: 2020-05-16 | Stop reason: HOSPADM

## 2020-05-15 RX ORDER — MODIFIED LANOLIN
OINTMENT (GRAM) TOPICAL PRN
Status: DISCONTINUED | OUTPATIENT
Start: 2020-05-15 | End: 2020-05-16 | Stop reason: HOSPADM

## 2020-05-15 RX ORDER — METHYLERGONOVINE MALEATE 0.2 MG/ML
200 INJECTION INTRAVENOUS
Status: ACTIVE | OUTPATIENT
Start: 2020-05-15 | End: 2020-05-15

## 2020-05-15 RX ORDER — SODIUM CHLORIDE 0.9 % (FLUSH) 0.9 %
10 SYRINGE (ML) INJECTION PRN
Status: DISCONTINUED | OUTPATIENT
Start: 2020-05-15 | End: 2020-05-16 | Stop reason: HOSPADM

## 2020-05-15 RX ORDER — ONDANSETRON 2 MG/ML
4 INJECTION INTRAMUSCULAR; INTRAVENOUS EVERY 6 HOURS PRN
Status: DISCONTINUED | OUTPATIENT
Start: 2020-05-15 | End: 2020-05-16 | Stop reason: HOSPADM

## 2020-05-15 RX ORDER — CARBOPROST TROMETHAMINE 250 UG/ML
250 INJECTION, SOLUTION INTRAMUSCULAR
Status: ACTIVE | OUTPATIENT
Start: 2020-05-15 | End: 2020-05-15

## 2020-05-15 RX ORDER — DOCUSATE SODIUM 100 MG/1
100 CAPSULE, LIQUID FILLED ORAL 2 TIMES DAILY PRN
Status: DISCONTINUED | OUTPATIENT
Start: 2020-05-15 | End: 2020-05-16 | Stop reason: HOSPADM

## 2020-05-15 RX ORDER — ACETAMINOPHEN 325 MG/1
650 TABLET ORAL EVERY 4 HOURS PRN
Status: DISCONTINUED | OUTPATIENT
Start: 2020-05-15 | End: 2020-05-16 | Stop reason: HOSPADM

## 2020-05-15 RX ORDER — ONDANSETRON 4 MG/1
8 TABLET, FILM COATED ORAL EVERY 8 HOURS PRN
Status: DISCONTINUED | OUTPATIENT
Start: 2020-05-15 | End: 2020-05-16 | Stop reason: HOSPADM

## 2020-05-15 RX ORDER — IBUPROFEN 800 MG/1
800 TABLET ORAL 3 TIMES DAILY
Status: DISCONTINUED | OUTPATIENT
Start: 2020-05-15 | End: 2020-05-16 | Stop reason: HOSPADM

## 2020-05-15 RX ORDER — MORPHINE SULFATE 4 MG/ML
4 INJECTION, SOLUTION INTRAMUSCULAR; INTRAVENOUS
Status: DISCONTINUED | OUTPATIENT
Start: 2020-05-15 | End: 2020-05-16 | Stop reason: HOSPADM

## 2020-05-15 RX ORDER — MISOPROSTOL 200 UG/1
800 TABLET ORAL
Status: ACTIVE | OUTPATIENT
Start: 2020-05-15 | End: 2020-05-15

## 2020-05-15 RX ORDER — SODIUM CHLORIDE, SODIUM LACTATE, POTASSIUM CHLORIDE, CALCIUM CHLORIDE 600; 310; 30; 20 MG/100ML; MG/100ML; MG/100ML; MG/100ML
INJECTION, SOLUTION INTRAVENOUS CONTINUOUS
Status: DISCONTINUED | OUTPATIENT
Start: 2020-05-15 | End: 2020-05-16 | Stop reason: HOSPADM

## 2020-05-15 RX ORDER — FERROUS SULFATE 325(65) MG
325 TABLET ORAL
Status: DISCONTINUED | OUTPATIENT
Start: 2020-05-15 | End: 2020-05-16 | Stop reason: HOSPADM

## 2020-05-15 RX ORDER — HYDROCODONE BITARTRATE AND ACETAMINOPHEN 5; 325 MG/1; MG/1
2 TABLET ORAL EVERY 4 HOURS PRN
Status: DISCONTINUED | OUTPATIENT
Start: 2020-05-15 | End: 2020-05-16 | Stop reason: HOSPADM

## 2020-05-15 RX ADMIN — ACETAMINOPHEN 650 MG: 325 TABLET ORAL at 18:15

## 2020-05-15 RX ADMIN — IBUPROFEN 800 MG: 800 TABLET, FILM COATED ORAL at 14:19

## 2020-05-15 RX ADMIN — DOCUSATE SODIUM 100 MG: 100 CAPSULE, LIQUID FILLED ORAL at 14:19

## 2020-05-15 RX ADMIN — IBUPROFEN 800 MG: 800 TABLET, FILM COATED ORAL at 02:04

## 2020-05-15 ASSESSMENT — PAIN SCALES - GENERAL
PAINLEVEL_OUTOF10: 3
PAINLEVEL_OUTOF10: 7
PAINLEVEL_OUTOF10: 7
PAINLEVEL_OUTOF10: 0

## 2020-05-15 NOTE — PLAN OF CARE
Problem: Anxiety:  Goal: Level of anxiety will decrease  Description: Level of anxiety will decrease  5/14/2020 2220 by Asuncion Valerio RN  Outcome: Ongoing  Note: Pt remains calm about the birthing experience, significant other at bedside, supportive. All questions/concerns addressed by RN. Problem: Breathing Pattern - Ineffective:  Goal: Able to breathe comfortably  Description: Able to breathe comfortably  5/14/2020 2220 by Asuncion Valerio RN  Outcome: Ongoing  Note: Respirations easy and unlabored. Unaware of contractions. Problem: Infection - Intrapartum Infection:  Goal: Will show no infection signs and symptoms  Description: Will show no infection signs and symptoms  5/14/2020 2220 by Asuncion Valerio RN  Outcome: Ongoing  Note: Afebrile. GBS+. PCN per orders. No s/s of infection noted. AROM<12hrs. Problem: Labor Process - Prolonged:  Goal: Uterine contractions within specified parameters  Description: Uterine contractions within specified parameters  5/14/2020 2220 by Asuncion Valerio RN  Outcome: Ongoing  Note: Continuous toco contractions every 2-4 minutes. Problem: Pain - Acute:  Goal: Able to cope with pain  Description: Able to cope with pain  5/14/2020 2220 by Asuncion Valerio RN  Outcome: Ongoing  Note: Pain monitored using MELODY scale. Patient comfortable with epidural. Pain goal 5/10. Denies pain at this time. Problem: Tissue Perfusion - Uteroplacental, Altered:  Goal: Absence of abnormal fetal heart rate pattern  Description: Absence of abnormal fetal heart rate pattern  5/14/2020 2220 by Asuncion Valerio RN  Outcome: Ongoing  Note: FSE, cat 1 FHT. Problem: Urinary Retention:  Goal: Urinary elimination within specified parameters  Description: Urinary elimination within specified parameters  5/14/2020 2220 by Asuncion Valerio RN  Outcome: Ongoing  Note: Bower in place draining light yellow urine.      Problem: Falls - Risk of:  Goal: Will remain free from falls  Description: Will remain free from falls  5/14/2020 2220 by Jose Trejo RN  Outcome: Ongoing  Note: Bed rest following epidural placement. Bed in lowest and locked position. Call light and belongings in reach. Problem: Discharge Planning:  Goal: Discharged to appropriate level of care  Description: Discharged to appropriate level of care  5/14/2020 2220 by Jose Trejo RN  Outcome: Ongoing  Note: Pt aware of 2hr recovery period in L&D and then transfer to mom/baby for the remainder of her stay. Problem: Pain:  Goal: Pain level will decrease  Description: Pain level will decrease  Outcome: Ongoing  Note: Pain monitored using MELODY scale. Patient comfortable with epidural. Pain goal 5/10. Denies pain at this time. Problem: Pain:  Goal: Control of acute pain  Description: Control of acute pain  Outcome: Ongoing  Note: Pain monitored using MELODY scale. Patient comfortable with epidural. Pain goal 5/10. Denies pain at this time. Problem: Pain:  Goal: Control of chronic pain  Description: Control of chronic pain  Outcome: Ongoing  Note: Pain monitored using MELODY scale. Patient comfortable with epidural. Pain goal 5/10. Denies pain at this time. Care plan reviewed with patient and significant other. Patient and significant other verbalize understanding of the plan of care and contribute to goal setting.

## 2020-05-15 NOTE — FLOWSHEET NOTE
Dr. Peter Abel called unit. Given update. Patient comfortable with an epidural. Last SVE 3cm/70%/-2. Scant amount of clear fluid noted on peripad. Cat 1 FHT with FSE. Contractions every 2-3 minutes. Continue to work towards delivery.

## 2020-05-15 NOTE — ANESTHESIA POSTPROCEDURE EVALUATION
Department of Anesthesiology  Postprocedure Note    Patient: Gennaro Sánchez  MRN: [id number]  YOB: 1995  Date of evaluation: 5/15/2020  Time:  3:53 PM     Procedure Summary     Date:  05/14/20 Room / Location:      Anesthesia Start:  1925 Anesthesia Stop:  05/15/20 0028    Procedure:  Labor Analgesia Diagnosis:      Scheduled Providers:   Responsible Provider:  Zulema Park DO    Anesthesia Type:  epidural ASA Status:  2          Anesthesia Type: No value filed. Abhi Phase I: Abhi Score: 9    Abhi Phase II: Abhi Score: 10    Last vitals: Reviewed and per EMR flowsheets.        Anesthesia Post Evaluation    Patient location during evaluation: floor  Patient participation: complete - patient participated  Level of consciousness: awake and alert  Airway patency: patent  Nausea & Vomiting: no nausea and no vomiting  Complications: no  Cardiovascular status: hemodynamically stable  Respiratory status: acceptable, room air and spontaneous ventilation  Hydration status: stable

## 2020-05-15 NOTE — L&D DELIVERY NOTE
intervention:  reduced  Nuchal cord description:  loose nuchal cord  Cord around:  head  Number of loops:  2  Delayed cord clamping?:  Yes  Cord clamped date/time:  5/15/2020 0030  Cord blood disposition:  Lab  Gases sent?:  No  Stem cell collection (by provider): No     Placenta    Date/time:  5/15/2020 00:33:00  Removal:  Spontaneous  Appearance:  Intact  Disposition:  Refrigerator     Delivery Resuscitation    Method:  Stimulation     Apgars    Living status:  Living  Apgars   1 Minute:   5 Minute:   10 Minute 15 Minute 20 Minute   Skin Color:        Heart Rate:        Reflex Irritability:        Muscle Tone:        Respiratory Effort: Total:                   [place] Measurements       Delivery Information    Episiotomy:  None  Perineal lacerations:  None    Vaginal laceration:  No    Cervical laceration:  No    Surgical or additional est. blood loss (mL):  0 (View Only):  Edit in Flowsheets   Combined est. blood loss (mL):  0  Repair suture:  None     Vaginal Delivery Counts    Initial count personnel:  INITAL TABLE SET UP 16-INSTRUMENTS,10-SPONGES,1-NEEDLE  Initial count verified by:  GABRIELE WILDER RN   4x4:   Needles:   Instruments:   Lap Pads:   Sponges:     Initial counts:          Final counts:          Final count personnel:  16 INSTRUMENTS,1 NEEDLE,10 SPONGES  Final count verified by:  DR CROSS/ GABRIELE ENRIQUEZRN  Accurate final count?:  Yes  Final vaginal sweep completed:  Yes     Other Procedures    Procedures:  None

## 2020-05-16 VITALS
HEART RATE: 80 BPM | HEIGHT: 60 IN | SYSTOLIC BLOOD PRESSURE: 116 MMHG | TEMPERATURE: 98.1 F | BODY MASS INDEX: 31.41 KG/M2 | OXYGEN SATURATION: 98 % | RESPIRATION RATE: 18 BRPM | DIASTOLIC BLOOD PRESSURE: 69 MMHG | WEIGHT: 160 LBS

## 2020-05-16 PROCEDURE — 6370000000 HC RX 637 (ALT 250 FOR IP): Performed by: OBSTETRICS & GYNECOLOGY

## 2020-05-16 RX ADMIN — HYDROCODONE BITARTRATE AND ACETAMINOPHEN 2 TABLET: 5; 325 TABLET ORAL at 10:52

## 2020-05-16 RX ADMIN — ACETAMINOPHEN 650 MG: 325 TABLET ORAL at 00:20

## 2020-05-16 ASSESSMENT — PAIN SCALES - GENERAL
PAINLEVEL_OUTOF10: 7
PAINLEVEL_OUTOF10: 7

## 2020-05-16 NOTE — PLAN OF CARE
Problem: Constipation:  Goal: Bowel elimination is within specified parameters  Description: Bowel elimination is within specified parameters  5/16/2020 0916 by Kimberly Robins RN  Outcome: Ongoing  Note: Pt states passing gas     Problem: Fluid Volume - Imbalance:  Goal: Absence of postpartum hemorrhage signs and symptoms  Description: Absence of postpartum hemorrhage signs and symptoms  5/16/2020 0916 by Kimberly Robins RN  Outcome: Ongoing  Note: Pt states small amount of vaginal bleeding     Problem: Infection - Risk of, Puerperal Infection:  Goal: Will show no infection signs and symptoms  Description: Will show no infection signs and symptoms  5/16/2020 0916 by Kimberly Robins RN  Outcome: Ongoing  Note: Pt vitals stable     Problem: Mood - Altered:  Goal: Mood stable  Description: Mood stable  5/16/2020 0916 by Sera Vuong RN  Outcome: Ongoing  Note: Pt calm and happy      Problem: Pain - Acute:  Goal: Pain level will decrease  Description: Pain level will decrease  5/16/2020 0916 by Sera Vuong RN  Outcome: Ongoing  Note: Pain controlled with po meds. Discussed ice for perineal pain or the use of warm blanket/heating pad for uterine cramps. Pt states her pain goal 4/10 has been met. Problem: Falls - Risk of:  Goal: Will remain free from falls  Description: Will remain free from falls  5/16/2020 0916 by Kimberly Robins RN  Outcome: Ongoing  Note: Pt remains free from falls  Care plan reviewed with patient. Patient verbalize understanding of the plan of care and contribute to goal setting.

## 2020-06-18 ENCOUNTER — PATIENT MESSAGE (OUTPATIENT)
Dept: UROLOGY | Age: 25
End: 2020-06-18

## 2020-06-18 RX ORDER — TRAMADOL HYDROCHLORIDE 50 MG/1
50 TABLET ORAL EVERY 4 HOURS PRN
Qty: 18 TABLET | Refills: 0 | Status: SHIPPED | OUTPATIENT
Start: 2020-06-18 | End: 2020-06-21

## 2020-06-18 NOTE — TELEPHONE ENCOUNTER
From: Jeannette Major  To: Tino Dove MD  Sent: 2020 10:19 AM EDT  Subject: Prescription Question    I had my baby a month ago but my stone is still in. This morning I woke up in an extreme amount of pain and throwing up stomach acid. I've tried warm baths that helped for a little bit but now the pain is back and I'm not able to go to the hospital because I've got my three year old and  I was wondering if there was anything I could get for paid.  Tylenol isn't helping as well

## 2020-06-18 NOTE — TELEPHONE ENCOUNTER
Needs Renal US due to history nonobstructing stones. She did not come to her appointment with Dr. Jon Cherry on 6/16. Will give her Ultram 3 days worth. Needs rescheduled in office.

## 2020-06-19 NOTE — TELEPHONE ENCOUNTER
You have been scheduled for a renal ultrasound on 06- at 4:30pm with an arrival of 4:00pm at [address] [place] [address]. Please make sure you avoid carbonated beverages that day and to drink plenty of water.   I have scheduled you for a follow up appointment with Dr. Mario Garrett on 06- at 2:15pm.

## 2020-06-23 ENCOUNTER — OFFICE VISIT (OUTPATIENT)
Dept: UROLOGY | Age: 25
End: 2020-06-23
Payer: COMMERCIAL

## 2020-06-23 ENCOUNTER — TELEPHONE (OUTPATIENT)
Dept: UROLOGY | Age: 25
End: 2020-06-23

## 2020-06-23 VITALS — HEIGHT: 61 IN | TEMPERATURE: 97.4 F | BODY MASS INDEX: 27.66 KG/M2 | WEIGHT: 146.5 LBS

## 2020-06-23 LAB
BILIRUBIN URINE: NEGATIVE
BLOOD URINE, POC: ABNORMAL
CHARACTER, URINE: CLEAR
COLOR, URINE: YELLOW
GLUCOSE URINE: NEGATIVE MG/DL
KETONES, URINE: NEGATIVE
LEUKOCYTE CLUMPS, URINE: ABNORMAL
NITRITE, URINE: NEGATIVE
PH, URINE: 5.5 (ref 5–9)
PROTEIN, URINE: NEGATIVE MG/DL
SPECIFIC GRAVITY, URINE: 1.02 (ref 1–1.03)
UROBILINOGEN, URINE: 0.2 EU/DL (ref 0–1)

## 2020-06-23 PROCEDURE — G8427 DOCREV CUR MEDS BY ELIG CLIN: HCPCS | Performed by: UROLOGY

## 2020-06-23 PROCEDURE — G8419 CALC BMI OUT NRM PARAM NOF/U: HCPCS | Performed by: UROLOGY

## 2020-06-23 PROCEDURE — 81003 URINALYSIS AUTO W/O SCOPE: CPT | Performed by: UROLOGY

## 2020-06-23 PROCEDURE — 99213 OFFICE O/P EST LOW 20 MIN: CPT | Performed by: UROLOGY

## 2020-06-23 PROCEDURE — 1036F TOBACCO NON-USER: CPT | Performed by: UROLOGY

## 2020-06-23 NOTE — PROGRESS NOTES
LEFT STENT placement  performed by Cara Elkins MD at [facility] Left 3/21/2020    CYSTOSCOPY, LEFT URETERAL STENT EXCHANGE performed by Adelita Buchanan MD at [address] [place] [address] / Lucie Oneil / Anil Petrona Left 2020    CYSTOSCOPY WITH LEFT URETERAL STENT EXCHANGE UNDER ULTRASOUND GUIDANCE performed by Cara Elkins MD at [address] [place] [address] / REMOVAL STENT / STONE N/A 3/2/2020    CYSTOSCOPY WITH ULTRASOUND GUIDED LEFT URETERAL STENT EXCHANGE performed by Cara Elkins MD at [address] [place] [address] / REMOVAL STENT / STONE Left 3/26/2020    CYSTOSCOPY, LEFT STENT REMOVAL performed by Dk Olmedo MD at [place] [id number]  12/21/15    miscarriage    OTHER SURGICAL HISTORY  2015    Cystoscopy with right ureteroscopy basket stone removal stent placement ([facility]. Latrice [facility], [facility])    TONSILLECTOMY Bilateral     age 5 or 13    TYMPANOSTOMY TUBE PLACEMENT       Family History   Problem Relation Age of Onset    Heart Disease Father     Heart Disease Paternal Grandfather      No outpatient medications have been marked as taking for the 20 encounter (Office Visit) with Adelita Buchanan MD.       Patient has no known allergies.   Social History     Tobacco Use   Smoking Status Former Smoker    Start date:     Last attempt to quit: 2019    Years since quittin.5   Smokeless Tobacco Never Used   Tobacco Comment    Vape       Social History     Substance and Sexual Activity   Alcohol Use Not Currently       REVIEW OF SYSTEMS:  Constitutional: negative  Eyes: negative  Respiratory: negative  Cardiovascular: negative  Gastrointestinal: negative  Genitourinary: negative  Musculoskeletal: negative  Skin: negative   Neurological: negative  Hematological/Lymphatic: negative  Psychological: negative    Physical Exam:      Vitals:    20 1438   Temp: 97.4 °F (36.3 °C)     Constitutional: Patient in no acute distress Assessment and Plan      1. Flank pain    2. Kidney stone           Plan:      Return in about 4 weeks (around 7/21/2020).   Follow up with Dr. Schaffer People in 2-3 weeks with CT results and to discuss surgery

## 2020-06-24 ENCOUNTER — TELEPHONE (OUTPATIENT)
Dept: UROLOGY | Age: 25
End: 2020-06-24

## 2020-07-09 ENCOUNTER — TELEPHONE (OUTPATIENT)
Dept: UROLOGY | Age: 25
End: 2020-07-09

## 2020-07-09 NOTE — TELEPHONE ENCOUNTER
Lupe, this pt has appt Monday 7-13 with Dr Rachel Mcpherson for CT results. She did not go to her CT that was scheduled on 7-7. Can you please call her and reschedule her appt and let her know she needs to call and get her CT rescheduled?

## 2021-03-01 ENCOUNTER — HOSPITAL ENCOUNTER (OUTPATIENT)
Age: 26
Discharge: HOME | End: 2021-03-01
Attending: OBSTETRICS & GYNECOLOGY | Admitting: OBSTETRICS & GYNECOLOGY
Payer: COMMERCIAL

## 2021-03-01 VITALS
BODY MASS INDEX: 29.64 KG/M2 | WEIGHT: 151 LBS | TEMPERATURE: 99 F | SYSTOLIC BLOOD PRESSURE: 98 MMHG | DIASTOLIC BLOOD PRESSURE: 53 MMHG | RESPIRATION RATE: 18 BRPM | HEIGHT: 60 IN | HEART RATE: 90 BPM

## 2021-03-01 PROBLEM — R11.2 NAUSEA & VOMITING: Status: ACTIVE | Noted: 2021-03-01

## 2021-03-01 PROCEDURE — 6360000002 HC RX W HCPCS

## 2021-03-01 PROCEDURE — 6370000000 HC RX 637 (ALT 250 FOR IP): Performed by: OBSTETRICS & GYNECOLOGY

## 2021-03-01 PROCEDURE — 96372 THER/PROPH/DIAG INJ SC/IM: CPT

## 2021-03-01 PROCEDURE — 96374 THER/PROPH/DIAG INJ IV PUSH: CPT

## 2021-03-01 PROCEDURE — 2580000003 HC RX 258: Performed by: OBSTETRICS & GYNECOLOGY

## 2021-03-01 PROCEDURE — 96361 HYDRATE IV INFUSION ADD-ON: CPT

## 2021-03-01 PROCEDURE — 6360000002 HC RX W HCPCS: Performed by: OBSTETRICS & GYNECOLOGY

## 2021-03-01 RX ORDER — LOPERAMIDE HYDROCHLORIDE 2 MG/1
2 CAPSULE ORAL 4 TIMES DAILY PRN
Status: DISCONTINUED | OUTPATIENT
Start: 2021-03-01 | End: 2021-03-01 | Stop reason: HOSPADM

## 2021-03-01 RX ORDER — DEXTROSE, SODIUM CHLORIDE, SODIUM LACTATE, POTASSIUM CHLORIDE, AND CALCIUM CHLORIDE 5; .6; .31; .03; .02 G/100ML; G/100ML; G/100ML; G/100ML; G/100ML
INJECTION, SOLUTION INTRAVENOUS CONTINUOUS
Status: DISCONTINUED | OUTPATIENT
Start: 2021-03-01 | End: 2021-03-01 | Stop reason: HOSPADM

## 2021-03-01 RX ORDER — ONDANSETRON 2 MG/ML
8 INJECTION INTRAMUSCULAR; INTRAVENOUS EVERY 6 HOURS PRN
Status: DISCONTINUED | OUTPATIENT
Start: 2021-03-01 | End: 2021-03-01 | Stop reason: HOSPADM

## 2021-03-01 RX ORDER — ACETAMINOPHEN 500 MG
1000 TABLET ORAL EVERY 6 HOURS PRN
COMMUNITY
End: 2022-01-26

## 2021-03-01 RX ORDER — PROMETHAZINE HYDROCHLORIDE 25 MG/ML
25 INJECTION, SOLUTION INTRAMUSCULAR; INTRAVENOUS ONCE
Status: COMPLETED | OUTPATIENT
Start: 2021-03-01 | End: 2021-03-01

## 2021-03-01 RX ORDER — PROMETHAZINE HYDROCHLORIDE 25 MG/ML
INJECTION, SOLUTION INTRAMUSCULAR; INTRAVENOUS
Status: COMPLETED
Start: 2021-03-01 | End: 2021-03-01

## 2021-03-01 RX ADMIN — PROMETHAZINE HYDROCHLORIDE 25 MG: 25 INJECTION INTRAMUSCULAR; INTRAVENOUS at 05:10

## 2021-03-01 RX ADMIN — SODIUM CHLORIDE, SODIUM LACTATE, POTASSIUM CHLORIDE, CALCIUM CHLORIDE AND DEXTROSE MONOHYDRATE: 5; 600; 310; 30; 20 INJECTION, SOLUTION INTRAVENOUS at 03:39

## 2021-03-01 RX ADMIN — SODIUM CHLORIDE, SODIUM LACTATE, POTASSIUM CHLORIDE, CALCIUM CHLORIDE AND DEXTROSE MONOHYDRATE: 5; 600; 310; 30; 20 INJECTION, SOLUTION INTRAVENOUS at 02:54

## 2021-03-01 RX ADMIN — PROMETHAZINE HYDROCHLORIDE 25 MG: 25 INJECTION, SOLUTION INTRAMUSCULAR; INTRAVENOUS at 05:10

## 2021-03-01 RX ADMIN — LOPERAMIDE HYDROCHLORIDE 2 MG: 2 CAPSULE ORAL at 05:09

## 2021-03-01 RX ADMIN — ONDANSETRON 8 MG: 2 INJECTION INTRAMUSCULAR; INTRAVENOUS at 02:54

## 2021-03-01 RX ADMIN — SODIUM CHLORIDE, SODIUM LACTATE, POTASSIUM CHLORIDE, CALCIUM CHLORIDE AND DEXTROSE MONOHYDRATE: 5; 600; 310; 30; 20 INJECTION, SOLUTION INTRAVENOUS at 06:35

## 2021-03-01 ASSESSMENT — PAIN DESCRIPTION - DESCRIPTORS: DESCRIPTORS: CRAMPING

## 2021-03-01 NOTE — FLOWSHEET NOTE
Dr Maggie Sterling called in for update. Informed that pt has been sleeping since this RN has been here. Has reactive strip, occ contraction, has not had any vomiting since admission, has had 1 episode of diarrhea, had immodium, paige clear liquids and crackers. Discharge order rec'd.

## 2021-03-01 NOTE — PLAN OF CARE
Problem: Pain:  Goal: Control of acute pain  Description: Control of acute pain  Note: Pain to be under 5/10 once stable     Problem: Bowel/Gastric:  Goal: Occurrences of nausea will decrease  Description: Occurrences of nausea will decrease  Note: IV fluids and medication given to eliminate nausea and vomiting     Problem: Coping:  Goal: Ability to identify and develop effective coping behavior will improve  Description: Ability to identify and develop effective coping behavior will improve  Note: Pt able to cope with current illness of n/v and diarrhea     Problem: Fluid Volume:  Goal: Maintenance of adequate hydration will improve  Description: Maintenance of adequate hydration will improve  Note: IV fluids and oral hydration as tolerated     Problem: Nutritional:  Goal: Achievement of adequate weight for body size and type will improve  Description: Achievement of adequate weight for body size and type will improve  Note: No loss of weight at this time. Problem: Sensory:  Goal: General experience of comfort will improve  Description: General experience of comfort will improve  Note: Pt to improve n/v and diarrhea status before being discharged. Problem: Falls - Risk of:  Goal: Will remain free from falls  Description: Will remain free from falls  Note: Pt to remain from injuries/fall with IV in place, walkway will remain free of clutter. Problem: Discharge Planning:  Goal: Discharged to appropriate level of care  Description: Discharged to appropriate level of care  Note: Once stable may go home and keep next scheduled appt. Care plan reviewed with patient. Patient  verbalize understanding of the plan of care and contribute to goal setting.

## 2021-03-01 NOTE — PLAN OF CARE
Problem: Bowel/Gastric:  Goal: Occurrences of nausea will decrease  Description: Occurrences of nausea will decrease  3/1/2021 0713 by Bisi Sloan RN  Outcome: Ongoing     Problem: Bowel/Gastric:  Goal: Occurrences of vomiting will decrease  Description: Occurrences of vomiting will decrease  Outcome: Ongoing  Note: Has had no vomiting since admission. Still C/O some nausea.      Problem: Fluid Volume:  Goal: Maintenance of adequate hydration will improve  Description: Maintenance of adequate hydration will improve  3/1/2021 0713 by Bisi Sloan RN  Outcome: Ongoing     Problem: Nutritional:  Goal: Achievement of adequate weight for body size and type will improve  Description: Achievement of adequate weight for body size and type will improve  3/1/2021 0713 by Bisi Sloan RN  Outcome: Ongoing     Problem: Sensory:  Goal: General experience of comfort will improve  Description: General experience of comfort will improve  3/1/2021 0713 by Bisi Sloan RN  Outcome: Ongoing     Problem: Falls - Risk of:  Goal: Will remain free from falls  Description: Will remain free from falls  3/1/2021 0717 by Bisi Sloan RN  Outcome: Ongoing     Problem: Discharge Planning:  Goal: Discharged to appropriate level of care  Description: Discharged to appropriate level of care  3/1/2021 0717 by Bisi Sloan RN  Outcome: Ongoing

## 2021-03-01 NOTE — FLOWSHEET NOTE
Pt here with c/o n/v and diarrhea x6 since 2100. Pt states she has tried Tylenol and water and has vomited that up also. Pt having bodyaches and denies any vaginal leaking or bleeding and not feeling baby move as much since having vomiting episodes. EFM applied to soft non tender abd.

## 2021-03-01 NOTE — FLOWSHEET NOTE
Dr. Harjeet Ragsdale informed Christianooctaviano Rico at 34+4 weeks here with c/o n/v and diarrhea x6 since 2100 and not able to keep water or Tylenol down. Informed reactive FHTs and having irregular contractions. New orders given.

## 2021-03-16 ENCOUNTER — HOSPITAL ENCOUNTER (OUTPATIENT)
Age: 26
Discharge: HOME | End: 2021-03-16
Attending: OBSTETRICS & GYNECOLOGY | Admitting: OBSTETRICS & GYNECOLOGY
Payer: COMMERCIAL

## 2021-03-16 VITALS
WEIGHT: 151 LBS | RESPIRATION RATE: 16 BRPM | TEMPERATURE: 97.7 F | SYSTOLIC BLOOD PRESSURE: 104 MMHG | HEIGHT: 60 IN | DIASTOLIC BLOOD PRESSURE: 67 MMHG | HEART RATE: 110 BPM | BODY MASS INDEX: 29.64 KG/M2 | OXYGEN SATURATION: 98 %

## 2021-03-16 PROBLEM — R10.9 ABDOMINAL PAIN: Status: ACTIVE | Noted: 2021-03-16

## 2021-03-16 LAB
ALBUMIN SERPL-MCNC: 3.2 G/DL (ref 3.5–5.1)
ALP BLD-CCNC: 77 U/L (ref 38–126)
ALT SERPL-CCNC: 6 U/L (ref 11–66)
ANION GAP SERPL CALCULATED.3IONS-SCNC: 11 MEQ/L (ref 8–16)
AST SERPL-CCNC: 11 U/L (ref 5–40)
BILIRUB SERPL-MCNC: < 0.2 MG/DL (ref 0.3–1.2)
BUN BLDV-MCNC: 6 MG/DL (ref 7–22)
CALCIUM SERPL-MCNC: 9 MG/DL (ref 8.5–10.5)
CHLORIDE BLD-SCNC: 104 MEQ/L (ref 98–111)
CO2: 22 MEQ/L (ref 23–33)
CREAT SERPL-MCNC: 0.8 MG/DL (ref 0.4–1.2)
CREATININE URINE: 68.7 MG/DL
ERYTHROCYTE [DISTWIDTH] IN BLOOD BY AUTOMATED COUNT: 14.3 % (ref 11.5–14.5)
ERYTHROCYTE [DISTWIDTH] IN BLOOD BY AUTOMATED COUNT: 44.1 FL (ref 35–45)
GFR SERPL CREATININE-BSD FRML MDRD: 88 ML/MIN/1.73M2
GLUCOSE BLD-MCNC: 94 MG/DL (ref 70–108)
HCT VFR BLD CALC: 29.8 % (ref 37–47)
HEMOGLOBIN: 9.3 GM/DL (ref 12–16)
MCH RBC QN AUTO: 26.6 PG (ref 26–33)
MCHC RBC AUTO-ENTMCNC: 31.2 GM/DL (ref 32.2–35.5)
MCV RBC AUTO: 85.4 FL (ref 81–99)
PLATELET # BLD: 187 THOU/MM3 (ref 130–400)
PMV BLD AUTO: 10.6 FL (ref 9.4–12.4)
POTASSIUM SERPL-SCNC: 3.7 MEQ/L (ref 3.5–5.2)
PROT/CREAT RATIO, UR: 0.15
PROTEIN, URINE: 10.2 MG/DL
RBC # BLD: 3.49 MILL/MM3 (ref 4.2–5.4)
SODIUM BLD-SCNC: 137 MEQ/L (ref 135–145)
TOTAL PROTEIN: 6.3 G/DL (ref 6.1–8)
WBC # BLD: 10.7 THOU/MM3 (ref 4.8–10.8)

## 2021-03-16 PROCEDURE — 84156 ASSAY OF PROTEIN URINE: CPT

## 2021-03-16 PROCEDURE — 6370000000 HC RX 637 (ALT 250 FOR IP): Performed by: OBSTETRICS & GYNECOLOGY

## 2021-03-16 PROCEDURE — 85027 COMPLETE CBC AUTOMATED: CPT

## 2021-03-16 PROCEDURE — 82570 ASSAY OF URINE CREATININE: CPT

## 2021-03-16 PROCEDURE — 80053 COMPREHEN METABOLIC PANEL: CPT

## 2021-03-16 PROCEDURE — 6370000000 HC RX 637 (ALT 250 FOR IP)

## 2021-03-16 PROCEDURE — 36415 COLL VENOUS BLD VENIPUNCTURE: CPT

## 2021-03-16 RX ORDER — FAMOTIDINE 20 MG/1
20 TABLET, FILM COATED ORAL 2 TIMES DAILY
Status: DISCONTINUED | OUTPATIENT
Start: 2021-03-16 | End: 2021-03-16

## 2021-03-16 RX ORDER — TRISODIUM CITRATE DIHYDRATE AND CITRIC ACID MONOHYDRATE 500; 334 MG/5ML; MG/5ML
15 SOLUTION ORAL ONCE
Status: COMPLETED | OUTPATIENT
Start: 2021-03-16 | End: 2021-03-16

## 2021-03-16 RX ORDER — FAMOTIDINE 20 MG/1
20 TABLET, FILM COATED ORAL ONCE
Status: COMPLETED | OUTPATIENT
Start: 2021-03-16 | End: 2021-03-16

## 2021-03-16 RX ORDER — TRISODIUM CITRATE DIHYDRATE AND CITRIC ACID MONOHYDRATE 500; 334 MG/5ML; MG/5ML
SOLUTION ORAL
Status: COMPLETED
Start: 2021-03-16 | End: 2021-03-16

## 2021-03-16 RX ADMIN — SODIUM CITRATE AND CITRIC ACID MONOHYDRATE 15 ML: 500; 334 SOLUTION ORAL at 19:13

## 2021-03-16 RX ADMIN — FAMOTIDINE 20 MG: 20 TABLET, FILM COATED ORAL at 19:28

## 2021-03-16 RX ADMIN — TRISODIUM CITRATE DIHYDRATE AND CITRIC ACID MONOHYDRATE 15 ML: 500; 334 SOLUTION ORAL at 19:13

## 2021-03-16 ASSESSMENT — PAIN DESCRIPTION - DESCRIPTORS: DESCRIPTORS: CRAMPING

## 2021-03-16 NOTE — FLOWSHEET NOTE
Patient left for home on foot in stable condition. Still feeling baby move, respirations easy and unlabored.

## 2021-03-16 NOTE — FLOWSHEET NOTE
Dr. De Huff calls dept, updated on  here at 39 5/7wks for complaints of upper abdominal pain that is constantly a 6/10. Pt states the pain flares up to a 9/10. Pt denies contractions, rare 20-30 sec contractions noted. Pt appears much more comfortable than she is rating the pain. Pt called the office and was told to come in for evaluation. No contractions noted, but some rare 20-30 sec contractions. Cervix 1/thick/ high, unsure of PP. Cervix medium consistency noted. Reactive FHT's. RN asking if pt has had gallbladder issues in the past. Pt denies any. Pt ate roast beef and french fries around 1400. Pt states pain started this morning around 0830. BP's reviewed. MD ordering labs and pepcid and P/C ratio.

## 2021-03-16 NOTE — PLAN OF CARE
Problem: Healthcare acquired conditions:  Goal: Absence of healthcare acquired conditions  Description: Absence of healthcare acquired conditions  Outcome: Ongoing  Note: Here for evaluation of upper abdominal pain. Problem: Discharge Planning:  Goal: Discharged to appropriate level of care  Description: Discharged to appropriate level of care  Outcome: Ongoing  Note: Pt likely to be discharged to home undelivered pending lab results    Care plan reviewed with patient. Patient verbalizes understanding of the plan of care and contribute to goal setting.

## 2021-03-16 NOTE — FLOWSHEET NOTE
Dr. Angus Vee calls dept and states she is now covering for the dept. Update given the same as Dr. Holloway Donate and what her POC is. States she will wait for lab and urine results.

## 2021-03-17 NOTE — H&P
[address] [place] [address]  History and Physical Update    Pt Name: Zuleima Geronimo  MRN: [id number]  YOB: 1995  Date of evaluation: 3/16/2021    [] I have examined the patient and reviewed the H&P/Consult and there are no changes to the patient or plans. [x] I have examined the patient and reviewed the H&P/Consult and have noted the following changes:26 yo  at 36 week with pain in upper abd. FHTs reactiv  No ctx  Appears comfortable texting in bed.'  Labs nl  Relief with pepcid and Bictra  abd NT to palpation. Assess: 36 weeks  No evidence of prep e or cholelithiasis  Likely heartburn given relief with bicitra. Return if recurs. Discussion with the patient and/ or family for proposed care, treatment, services; benefits, risks, side effects; likelihood of achieving goals and potential problems that may occur during recuperation was had and all questions were answered. Discussion with the patient and/ or family of reasonable alternatives to the proposed care, treatment, services and the discussion of the risks, benefits, side effects related to the alternatives and the risk related to not receiving the proposed care treatment services was also had and all questions were answered. If this is for an elective surgical procedure then The patient was counseled at length about the risks of fernanda Covid-19 during their perioperative period and any recovery window from their procedure. The patient was made aware that fernanda Covid-19  may worsen their prognosis for recovering from their procedure  and lend to a higher morbidity and/or mortality risk. All material risks, benefits, and reasonable alternatives including postponing the procedure were discussed. The patient  does wish to proceed with the procedure at this time.              Lora Arreola MD  Electronically signed 3/16/2021 at 11:15 PM

## 2021-03-24 ENCOUNTER — HOSPITAL ENCOUNTER (INPATIENT)
Age: 26
LOS: 3 days | Discharge: HOME | End: 2021-03-27
Attending: OBSTETRICS & GYNECOLOGY | Admitting: OBSTETRICS & GYNECOLOGY
Payer: COMMERCIAL

## 2021-03-24 ENCOUNTER — ANESTHESIA (OUTPATIENT)
Dept: LABOR AND DELIVERY | Age: 26
End: 2021-03-24
Payer: COMMERCIAL

## 2021-03-24 ENCOUNTER — ANESTHESIA EVENT (OUTPATIENT)
Dept: LABOR AND DELIVERY | Age: 26
End: 2021-03-24
Payer: COMMERCIAL

## 2021-03-24 LAB
ABO: NORMAL
AMPHETAMINE+METHAMPHETAMINE URINE SCREEN: NEGATIVE
ANTIBODY SCREEN: NORMAL
BARBITURATE QUANTITATIVE URINE: NEGATIVE
BASOPHILS # BLD: 0.3 %
BASOPHILS ABSOLUTE: 0 THOU/MM3 (ref 0–0.1)
BENZODIAZEPINE QUANTITATIVE URINE: NEGATIVE
CANNABINOID QUANTITATIVE URINE: POSITIVE
COCAINE METABOLITE QUANTITATIVE URINE: NEGATIVE
EOSINOPHIL # BLD: 0.7 %
EOSINOPHILS ABSOLUTE: 0.1 THOU/MM3 (ref 0–0.4)
ERYTHROCYTE [DISTWIDTH] IN BLOOD BY AUTOMATED COUNT: 14.5 % (ref 11.5–14.5)
ERYTHROCYTE [DISTWIDTH] IN BLOOD BY AUTOMATED COUNT: 44.6 FL (ref 35–45)
HCT VFR BLD CALC: 33.6 % (ref 37–47)
HEMOGLOBIN: 10.3 GM/DL (ref 12–16)
IMMATURE GRANS (ABS): 0.23 THOU/MM3 (ref 0–0.07)
IMMATURE GRANULOCYTES: 2.1 %
LYMPHOCYTES # BLD: 20.1 %
LYMPHOCYTES ABSOLUTE: 2.2 THOU/MM3 (ref 1–4.8)
MCH RBC QN AUTO: 26.3 PG (ref 26–33)
MCHC RBC AUTO-ENTMCNC: 30.7 GM/DL (ref 32.2–35.5)
MCV RBC AUTO: 85.7 FL (ref 81–99)
MONOCYTES # BLD: 6.6 %
MONOCYTES ABSOLUTE: 0.7 THOU/MM3 (ref 0.4–1.3)
NUCLEATED RED BLOOD CELLS: 0 /100 WBC
OPIATES, URINE: NEGATIVE
OXYCODONE: NEGATIVE
PHENCYCLIDINE QUANTITATIVE URINE: NEGATIVE
PLATELET # BLD: 192 THOU/MM3 (ref 130–400)
PMV BLD AUTO: 10.9 FL (ref 9.4–12.4)
RBC # BLD: 3.92 MILL/MM3 (ref 4.2–5.4)
RH FACTOR: NORMAL
SEG NEUTROPHILS: 70.2 %
SEGMENTED NEUTROPHILS ABSOLUTE COUNT: 7.8 THOU/MM3 (ref 1.8–7.7)
WBC # BLD: 11.1 THOU/MM3 (ref 4.8–10.8)

## 2021-03-24 PROCEDURE — 2500000003 HC RX 250 WO HCPCS: Performed by: ANESTHESIOLOGY

## 2021-03-24 PROCEDURE — 6360000002 HC RX W HCPCS: Performed by: OBSTETRICS & GYNECOLOGY

## 2021-03-24 PROCEDURE — 86901 BLOOD TYPING SEROLOGIC RH(D): CPT

## 2021-03-24 PROCEDURE — 85025 COMPLETE CBC W/AUTO DIFF WBC: CPT

## 2021-03-24 PROCEDURE — 10907ZC DRAINAGE OF AMNIOTIC FLUID, THERAPEUTIC FROM PRODUCTS OF CONCEPTION, VIA NATURAL OR ARTIFICIAL OPENING: ICD-10-PCS | Performed by: OBSTETRICS & GYNECOLOGY

## 2021-03-24 PROCEDURE — 36415 COLL VENOUS BLD VENIPUNCTURE: CPT

## 2021-03-24 PROCEDURE — 6360000002 HC RX W HCPCS

## 2021-03-24 PROCEDURE — 86900 BLOOD TYPING SEROLOGIC ABO: CPT

## 2021-03-24 PROCEDURE — 3700000025 EPIDURAL BLOCK: Performed by: ANESTHESIOLOGY

## 2021-03-24 PROCEDURE — 3E033VJ INTRODUCTION OF OTHER HORMONE INTO PERIPHERAL VEIN, PERCUTANEOUS APPROACH: ICD-10-PCS | Performed by: OBSTETRICS & GYNECOLOGY

## 2021-03-24 PROCEDURE — 2580000003 HC RX 258: Performed by: OBSTETRICS & GYNECOLOGY

## 2021-03-24 PROCEDURE — 80307 DRUG TEST PRSMV CHEM ANLYZR: CPT

## 2021-03-24 PROCEDURE — 1220000001 HC SEMI PRIVATE L&D R&B

## 2021-03-24 PROCEDURE — 86592 SYPHILIS TEST NON-TREP QUAL: CPT

## 2021-03-24 PROCEDURE — 86850 RBC ANTIBODY SCREEN: CPT

## 2021-03-24 RX ORDER — IBUPROFEN 800 MG/1
800 TABLET ORAL EVERY 8 HOURS PRN
Status: DISCONTINUED | OUTPATIENT
Start: 2021-03-24 | End: 2021-03-25 | Stop reason: HOSPADM

## 2021-03-24 RX ORDER — TERBUTALINE SULFATE 1 MG/ML
0.25 INJECTION, SOLUTION SUBCUTANEOUS
Status: ACTIVE | OUTPATIENT
Start: 2021-03-24 | End: 2021-03-24

## 2021-03-24 RX ORDER — LIDOCAINE HYDROCHLORIDE 10 MG/ML
30 INJECTION, SOLUTION EPIDURAL; INFILTRATION; INTRACAUDAL; PERINEURAL PRN
Status: DISCONTINUED | OUTPATIENT
Start: 2021-03-24 | End: 2021-03-25 | Stop reason: HOSPADM

## 2021-03-24 RX ORDER — CARBOPROST TROMETHAMINE 250 UG/ML
250 INJECTION, SOLUTION INTRAMUSCULAR PRN
Status: DISCONTINUED | OUTPATIENT
Start: 2021-03-24 | End: 2021-03-25 | Stop reason: HOSPADM

## 2021-03-24 RX ORDER — NALOXONE HYDROCHLORIDE 0.4 MG/ML
0.4 INJECTION, SOLUTION INTRAMUSCULAR; INTRAVENOUS; SUBCUTANEOUS PRN
Status: DISCONTINUED | OUTPATIENT
Start: 2021-03-24 | End: 2021-03-25 | Stop reason: HOSPADM

## 2021-03-24 RX ORDER — MORPHINE SULFATE 4 MG/ML
4 INJECTION, SOLUTION INTRAMUSCULAR; INTRAVENOUS
Status: DISCONTINUED | OUTPATIENT
Start: 2021-03-24 | End: 2021-03-25 | Stop reason: HOSPADM

## 2021-03-24 RX ORDER — SEVOFLURANE 250 ML/250ML
1 LIQUID RESPIRATORY (INHALATION) CONTINUOUS PRN
Status: DISCONTINUED | OUTPATIENT
Start: 2021-03-24 | End: 2021-03-25 | Stop reason: HOSPADM

## 2021-03-24 RX ORDER — ONDANSETRON 2 MG/ML
4 INJECTION INTRAMUSCULAR; INTRAVENOUS EVERY 6 HOURS PRN
Status: DISCONTINUED | OUTPATIENT
Start: 2021-03-24 | End: 2021-03-25 | Stop reason: HOSPADM

## 2021-03-24 RX ORDER — SODIUM CHLORIDE 0.9 % (FLUSH) 0.9 %
10 SYRINGE (ML) INJECTION PRN
Status: DISCONTINUED | OUTPATIENT
Start: 2021-03-24 | End: 2021-03-25 | Stop reason: HOSPADM

## 2021-03-24 RX ORDER — DIPHENHYDRAMINE HYDROCHLORIDE 50 MG/ML
25 INJECTION INTRAMUSCULAR; INTRAVENOUS EVERY 4 HOURS PRN
Status: DISCONTINUED | OUTPATIENT
Start: 2021-03-24 | End: 2021-03-25 | Stop reason: HOSPADM

## 2021-03-24 RX ORDER — MISOPROSTOL 200 UG/1
1000 TABLET ORAL PRN
Status: DISCONTINUED | OUTPATIENT
Start: 2021-03-24 | End: 2021-03-25 | Stop reason: HOSPADM

## 2021-03-24 RX ORDER — ACETAMINOPHEN 325 MG/1
650 TABLET ORAL EVERY 4 HOURS PRN
Status: DISCONTINUED | OUTPATIENT
Start: 2021-03-24 | End: 2021-03-25 | Stop reason: HOSPADM

## 2021-03-24 RX ORDER — SODIUM CHLORIDE 0.9 % (FLUSH) 0.9 %
10 SYRINGE (ML) INJECTION EVERY 12 HOURS SCHEDULED
Status: DISCONTINUED | OUTPATIENT
Start: 2021-03-24 | End: 2021-03-25 | Stop reason: HOSPADM

## 2021-03-24 RX ORDER — METHYLERGONOVINE MALEATE 0.2 MG/ML
200 INJECTION INTRAVENOUS PRN
Status: DISCONTINUED | OUTPATIENT
Start: 2021-03-24 | End: 2021-03-25 | Stop reason: HOSPADM

## 2021-03-24 RX ORDER — ONDANSETRON 2 MG/ML
8 INJECTION INTRAMUSCULAR; INTRAVENOUS EVERY 6 HOURS PRN
Status: DISCONTINUED | OUTPATIENT
Start: 2021-03-24 | End: 2021-03-25 | Stop reason: HOSPADM

## 2021-03-24 RX ORDER — BUTORPHANOL TARTRATE 1 MG/ML
1 INJECTION, SOLUTION INTRAMUSCULAR; INTRAVENOUS
Status: DISCONTINUED | OUTPATIENT
Start: 2021-03-24 | End: 2021-03-25 | Stop reason: HOSPADM

## 2021-03-24 RX ORDER — SODIUM CHLORIDE, SODIUM LACTATE, POTASSIUM CHLORIDE, CALCIUM CHLORIDE 600; 310; 30; 20 MG/100ML; MG/100ML; MG/100ML; MG/100ML
INJECTION, SOLUTION INTRAVENOUS CONTINUOUS
Status: DISCONTINUED | OUTPATIENT
Start: 2021-03-24 | End: 2021-03-25

## 2021-03-24 RX ORDER — NALBUPHINE HCL 10 MG/ML
5 AMPUL (ML) INJECTION EVERY 4 HOURS PRN
Status: DISCONTINUED | OUTPATIENT
Start: 2021-03-24 | End: 2021-03-25 | Stop reason: HOSPADM

## 2021-03-24 RX ORDER — PENICILLIN G 3000000 [IU]/50ML
3 INJECTION, SOLUTION INTRAVENOUS EVERY 4 HOURS
Status: DISCONTINUED | OUTPATIENT
Start: 2021-03-24 | End: 2021-03-25 | Stop reason: HOSPADM

## 2021-03-24 RX ORDER — MORPHINE SULFATE 2 MG/ML
2 INJECTION, SOLUTION INTRAMUSCULAR; INTRAVENOUS
Status: DISCONTINUED | OUTPATIENT
Start: 2021-03-24 | End: 2021-03-25 | Stop reason: HOSPADM

## 2021-03-24 RX ADMIN — BUTORPHANOL TARTRATE 1 MG: 1 INJECTION, SOLUTION INTRAMUSCULAR; INTRAVENOUS at 19:22

## 2021-03-24 RX ADMIN — SODIUM CHLORIDE, POTASSIUM CHLORIDE, SODIUM LACTATE AND CALCIUM CHLORIDE: 600; 310; 30; 20 INJECTION, SOLUTION INTRAVENOUS at 15:35

## 2021-03-24 RX ADMIN — SODIUM CHLORIDE, POTASSIUM CHLORIDE, SODIUM LACTATE AND CALCIUM CHLORIDE: 600; 310; 30; 20 INJECTION, SOLUTION INTRAVENOUS at 22:41

## 2021-03-24 RX ADMIN — DEXTROSE MONOHYDRATE 5 MILLION UNITS: 5 INJECTION INTRAVENOUS at 16:13

## 2021-03-24 RX ADMIN — PENICILLIN G 3 MILLION UNITS: 3000000 INJECTION, SOLUTION INTRAVENOUS at 20:18

## 2021-03-24 RX ADMIN — Medication 1 MILLI-UNITS/MIN: at 16:17

## 2021-03-24 RX ADMIN — BUTORPHANOL TARTRATE 1 MG: 1 INJECTION, SOLUTION INTRAMUSCULAR; INTRAVENOUS at 21:11

## 2021-03-24 RX ADMIN — Medication 18 ML/HR: at 23:48

## 2021-03-24 ASSESSMENT — PAIN SCALES - GENERAL
PAINLEVEL_OUTOF10: 7
PAINLEVEL_OUTOF10: 7

## 2021-03-24 ASSESSMENT — PAIN DESCRIPTION - DESCRIPTORS
DESCRIPTORS: CRAMPING
DESCRIPTORS: CRAMPING

## 2021-03-24 NOTE — PLAN OF CARE
Problem: Anxiety:  Goal: Level of anxiety will decrease  Description: Level of anxiety will decrease  Outcome: Ongoing  Note: Appears calm and relaxed. Problem: Breathing Pattern - Ineffective:  Goal: Able to breathe comfortably  Description: Able to breathe comfortably  Outcome: Ongoing  Note: Resp easy and regular. Problem: Fluid Volume - Imbalance:  Goal: Absence of intrapartum hemorrhage signs and symptoms  Description: Absence of intrapartum hemorrhage signs and symptoms  Outcome: Ongoing  Note: Vitals stable. No bleeding noted. Problem: Infection - Intrapartum Infection:  Goal: Will show no infection signs and symptoms  Description: Will show no infection signs and symptoms  Outcome: Ongoing  Note: Vitals stable. Afebrile. Problem: Labor Process - Prolonged:  Goal: Uterine contractions within specified parameters  Description: Uterine contractions within specified parameters  Outcome: Ongoing  Note: Contractions irreg. Mild. Problem: Pain - Acute:  Goal: Able to cope with pain  Description: Able to cope with pain  Outcome: Ongoing  Note: Denies need for pain meds at this time. Plans IV meds. Problem: Tissue Perfusion - Uteroplacental, Altered:  Goal: Absence of abnormal fetal heart rate pattern  Description: Absence of abnormal fetal heart rate pattern  Outcome: Ongoing  Note: Reactive strip. Problem: Urinary Retention:  Goal: Urinary elimination within specified parameters  Description: Urinary elimination within specified parameters  Outcome: Ongoing  Note: Voiding qs. Problem: Falls - Risk of:  Goal: Absence of physical injury  Description: Absence of physical injury  Outcome: Ongoing  Note: SR up times 2. Call light with in reach. Family at bedside. Problem: Discharge Planning:  Goal: Discharged to appropriate level of care  Description: Discharged to appropriate level of care  Outcome: Ongoing  Note: Planning TRO to mom baby after delivery then home in 2-3 days. Care plan reviewed with patient . Patient verbalize understanding of the plan of care and contribute to goal setting.

## 2021-03-25 LAB — RPR: NONREACTIVE

## 2021-03-25 PROCEDURE — 6360000002 HC RX W HCPCS: Performed by: OBSTETRICS & GYNECOLOGY

## 2021-03-25 PROCEDURE — 7200000001 HC VAGINAL DELIVERY

## 2021-03-25 PROCEDURE — 1200000000 HC SEMI PRIVATE

## 2021-03-25 PROCEDURE — 2580000003 HC RX 258: Performed by: OBSTETRICS & GYNECOLOGY

## 2021-03-25 PROCEDURE — 6370000000 HC RX 637 (ALT 250 FOR IP): Performed by: OBSTETRICS & GYNECOLOGY

## 2021-03-25 RX ORDER — IBUPROFEN 800 MG/1
800 TABLET ORAL 3 TIMES DAILY
Status: DISCONTINUED | OUTPATIENT
Start: 2021-03-25 | End: 2021-03-27 | Stop reason: HOSPADM

## 2021-03-25 RX ORDER — METHYLERGONOVINE MALEATE 0.2 MG/ML
200 INJECTION INTRAVENOUS
Status: ACTIVE | OUTPATIENT
Start: 2021-03-25 | End: 2021-03-25

## 2021-03-25 RX ORDER — FERROUS SULFATE 325(65) MG
325 TABLET ORAL
Status: DISCONTINUED | OUTPATIENT
Start: 2021-03-25 | End: 2021-03-27 | Stop reason: HOSPADM

## 2021-03-25 RX ORDER — ONDANSETRON 2 MG/ML
4 INJECTION INTRAMUSCULAR; INTRAVENOUS EVERY 6 HOURS PRN
Status: DISCONTINUED | OUTPATIENT
Start: 2021-03-25 | End: 2021-03-27 | Stop reason: HOSPADM

## 2021-03-25 RX ORDER — MISOPROSTOL 200 UG/1
800 TABLET ORAL
Status: ACTIVE | OUTPATIENT
Start: 2021-03-25 | End: 2021-03-25

## 2021-03-25 RX ORDER — SODIUM CHLORIDE 0.9 % (FLUSH) 0.9 %
10 SYRINGE (ML) INJECTION PRN
Status: DISCONTINUED | OUTPATIENT
Start: 2021-03-25 | End: 2021-03-27 | Stop reason: HOSPADM

## 2021-03-25 RX ORDER — SODIUM CHLORIDE, SODIUM LACTATE, POTASSIUM CHLORIDE, CALCIUM CHLORIDE 600; 310; 30; 20 MG/100ML; MG/100ML; MG/100ML; MG/100ML
INJECTION, SOLUTION INTRAVENOUS CONTINUOUS
Status: DISCONTINUED | OUTPATIENT
Start: 2021-03-25 | End: 2021-03-27 | Stop reason: HOSPADM

## 2021-03-25 RX ORDER — CARBOPROST TROMETHAMINE 250 UG/ML
250 INJECTION, SOLUTION INTRAMUSCULAR
Status: ACTIVE | OUTPATIENT
Start: 2021-03-25 | End: 2021-03-25

## 2021-03-25 RX ORDER — ACETAMINOPHEN 325 MG/1
650 TABLET ORAL EVERY 4 HOURS PRN
Status: DISCONTINUED | OUTPATIENT
Start: 2021-03-25 | End: 2021-03-27 | Stop reason: HOSPADM

## 2021-03-25 RX ORDER — DOCUSATE SODIUM 100 MG/1
100 CAPSULE, LIQUID FILLED ORAL 2 TIMES DAILY PRN
Status: DISCONTINUED | OUTPATIENT
Start: 2021-03-25 | End: 2021-03-27 | Stop reason: HOSPADM

## 2021-03-25 RX ORDER — SODIUM CHLORIDE 0.9 % (FLUSH) 0.9 %
10 SYRINGE (ML) INJECTION EVERY 12 HOURS SCHEDULED
Status: DISCONTINUED | OUTPATIENT
Start: 2021-03-25 | End: 2021-03-27 | Stop reason: HOSPADM

## 2021-03-25 RX ORDER — MODIFIED LANOLIN
OINTMENT (GRAM) TOPICAL PRN
Status: DISCONTINUED | OUTPATIENT
Start: 2021-03-25 | End: 2021-03-27 | Stop reason: HOSPADM

## 2021-03-25 RX ORDER — MORPHINE SULFATE 4 MG/ML
4 INJECTION, SOLUTION INTRAMUSCULAR; INTRAVENOUS
Status: DISCONTINUED | OUTPATIENT
Start: 2021-03-25 | End: 2021-03-27 | Stop reason: HOSPADM

## 2021-03-25 RX ORDER — HYDROCODONE BITARTRATE AND ACETAMINOPHEN 5; 325 MG/1; MG/1
1 TABLET ORAL EVERY 4 HOURS PRN
Status: DISCONTINUED | OUTPATIENT
Start: 2021-03-25 | End: 2021-03-27 | Stop reason: HOSPADM

## 2021-03-25 RX ORDER — ONDANSETRON 8 MG/1
8 TABLET, ORALLY DISINTEGRATING ORAL EVERY 8 HOURS PRN
Status: DISCONTINUED | OUTPATIENT
Start: 2021-03-25 | End: 2021-03-27 | Stop reason: HOSPADM

## 2021-03-25 RX ORDER — HYDROCODONE BITARTRATE AND ACETAMINOPHEN 5; 325 MG/1; MG/1
2 TABLET ORAL EVERY 4 HOURS PRN
Status: DISCONTINUED | OUTPATIENT
Start: 2021-03-25 | End: 2021-03-27 | Stop reason: HOSPADM

## 2021-03-25 RX ORDER — MORPHINE SULFATE 4 MG/ML
2 INJECTION, SOLUTION INTRAMUSCULAR; INTRAVENOUS
Status: DISCONTINUED | OUTPATIENT
Start: 2021-03-25 | End: 2021-03-27 | Stop reason: HOSPADM

## 2021-03-25 RX ADMIN — PENICILLIN G 3 MILLION UNITS: 3000000 INJECTION, SOLUTION INTRAVENOUS at 00:17

## 2021-03-25 RX ADMIN — IBUPROFEN 800 MG: 800 TABLET, FILM COATED ORAL at 04:00

## 2021-03-25 RX ADMIN — IBUPROFEN 800 MG: 800 TABLET, FILM COATED ORAL at 18:22

## 2021-03-25 RX ADMIN — DOCUSATE SODIUM 100 MG: 100 CAPSULE, LIQUID FILLED ORAL at 20:37

## 2021-03-25 RX ADMIN — SODIUM CHLORIDE, POTASSIUM CHLORIDE, SODIUM LACTATE AND CALCIUM CHLORIDE: 600; 310; 30; 20 INJECTION, SOLUTION INTRAVENOUS at 00:17

## 2021-03-25 RX ADMIN — Medication 166.7 ML: at 03:49

## 2021-03-25 RX ADMIN — ONDANSETRON 8 MG: 2 INJECTION INTRAMUSCULAR; INTRAVENOUS at 00:21

## 2021-03-25 RX ADMIN — ACETAMINOPHEN 650 MG: 325 TABLET ORAL at 22:09

## 2021-03-25 ASSESSMENT — PAIN DESCRIPTION - PROGRESSION: CLINICAL_PROGRESSION: GRADUALLY WORSENING

## 2021-03-25 ASSESSMENT — PAIN SCALES - GENERAL
PAINLEVEL_OUTOF10: 5
PAINLEVEL_OUTOF10: 4
PAINLEVEL_OUTOF10: 2

## 2021-03-25 ASSESSMENT — PAIN DESCRIPTION - FREQUENCY: FREQUENCY: INTERMITTENT

## 2021-03-25 ASSESSMENT — PAIN DESCRIPTION - PAIN TYPE: TYPE: ACUTE PAIN

## 2021-03-25 ASSESSMENT — PAIN - FUNCTIONAL ASSESSMENT: PAIN_FUNCTIONAL_ASSESSMENT: ACTIVITIES ARE NOT PREVENTED

## 2021-03-25 ASSESSMENT — PAIN DESCRIPTION - ORIENTATION: ORIENTATION: LOWER

## 2021-03-25 ASSESSMENT — PAIN DESCRIPTION - LOCATION: LOCATION: BACK

## 2021-03-25 NOTE — PLAN OF CARE
Falls - Risk of:  Goal: Absence of physical injury  Description: Absence of physical injury  3/24/2021 1955 by Deangelo Huff RN  Note: Pt aware to call out for assistance to BR. Aware after epidural in place she will not be able to get up until after delivery and epidural removed and sensation returns. Up to BR to void x2 with RN assistance then. Problem: Discharge Planning:  Goal: Discharged to appropriate level of care  Description: Discharged to appropriate level of care  3/24/2021 1955 by Deangelo Huff RN  Note: Patient will remain on l/d for two hours after delivery for recovery period. She will then be transferred to postpartum for remainder of stay as long as she remains in stable condition. Care plan reviewed with patient. Patient verbalize understanding of the plan of care and contribute to goal setting.

## 2021-03-25 NOTE — PLAN OF CARE
Problem: Discharge Planning:  Goal: Discharged to appropriate level of care  Description: Discharged to appropriate level of care  Outcome: Ongoing  Note: On the maternity Unit for car eand medication     Problem: Constipation:  Goal: Bowel elimination is within specified parameters  Description: Bowel elimination is within specified parameters  Outcome: Ongoing  Note: Passing gas, Colace given po     Problem: Fluid Volume - Imbalance:  Goal: Absence of postpartum hemorrhage signs and symptoms  Description: Absence of postpartum hemorrhage signs and symptoms  Outcome: Ongoing  Note: Moderate amount of red lochia noted, fundus is firm and centered     Problem: Infection - Risk of, Puerperal Infection:  Goal: Will show no infection signs and symptoms  Description: Will show no infection signs and symptoms  Outcome: Ongoing  Note: V/S WNL, no untoward s/s reported     Problem: Mood - Altered:  Goal: Mood stable  Description: Mood stable  Outcome: Ongoing  Note: Pleasant     Problem: Pain - Acute:  Goal: Pain level will decrease  Description: Pain level will decrease  Outcome: Ongoing  Note: Pain level is \"2\", pain goal is \"4\" declined offer of medication     Problem: Falls - Risk of:  Goal: Will remain free from falls  Description: Will remain free from falls  Outcome: Ongoing  Note: No falls reported     Problem: Falls - Risk of:  Goal: Absence of physical injury  Description: Absence of physical injury  Outcome: Ongoing  Note: No falls reported, no untoward s/s noted   Care plan reviewed with patient and verbalized understanding. Patient contributed to goal setting.

## 2021-03-25 NOTE — FLOWSHEET NOTE
Infant has roomed in with mother this shift except while Mom is resting. Benefits of rooming in discussed.

## 2021-03-25 NOTE — L&D DELIVERY NOTE
Department of Obstetrics and Gynecology   Vaginal Delivery Note at [facility]      Date of Delivery:  3/25/2021    Procedure:  Spontaneous vaginal delivery    Surgeon:  Madie Rodrigues    Anesthesia:  epidural anesthesia    Estimated blood loss:  300ml    Cord blood sent Yes    Complications:  IUGR, decreased fetal movement    Condition:  infant stable to general nursery and mother stable    Details of Procedure: The patient is a 23 y.o.  female at 38w0d  who was admitted for IOL for IUGR and decreased fetal movement. She received the following interventions: ARBOW and IV Pitocin induction. The patient progressed well,did receive an epidural, became complete and started to push. Patient progressed well and the fetal head was delivered over an intact perineum,  and the rest of the infant delivered atraumatically, placed on mother abdomen. The cord was clamped and cut after 1 minute and infant handed off to the waiting nurse for evaluation. Cord segment and cord blood collected. The delivery of the placenta was spontaneous. The perineum and vagina were explored and no lacerations were present that required repair. A vaginal sweep was completed and one sharp was placed in the proper container. Sponge count correct.     Infant Wt:  pending    APGARS:   pending  Otto Jones [[id number]]             Electronically signed by Nina Coto MD on 3/25/2021 at 3:56 AM

## 2021-03-25 NOTE — FLOWSHEET NOTE
Up to the bathroom with steady gait voided well.  Perineal care done moderate amount of red lochia Fundus is u/1 firm and centered

## 2021-03-25 NOTE — FLOWSHEET NOTE
Assisted pt to BR in labor room, gait steady to and from bed. Voided large amt, small lochia. Assisted into w/c and taken to room 5B26 with infant in arms.

## 2021-03-25 NOTE — PROGRESS NOTES
Called for delivery. On arrival at bedside. Pt complete and +4. Uncomfortable and pushing adequately with contractions. FHTS: 130, mod patel, +accels, variable toco: q2 min. Category II tracing. Overall reassuring. Anticipate .      Leanora Blocker  3:56 AM  3/25/2021

## 2021-03-25 NOTE — FLOWSHEET NOTE
Dr. Luisa Denny at [facility], updated on pt's status. Have not done SVE since she AROM. Pt had dose of stadol. Will eventually get epidural when hurting more. Continuing with POC.

## 2021-03-25 NOTE — ANESTHESIA PROCEDURE NOTES
Epidural Block    Patient location during procedure: OB  Reason for block: labor epidural  Staffing  Performed: anesthesiologist   Anesthesiologist: Ellie Speaks, DO  Preanesthetic Checklist  Completed: patient identified, IV checked, site marked, risks and benefits discussed, surgical consent, monitors and equipment checked, pre-op evaluation, timeout performed, anesthesia consent given, oxygen available and patient being monitored  Epidural  Patient position: sitting  Prep: ChloraPrep  Approach: midline  Location: lumbar (1-5)  Injection technique: AMBIKA saline  Provider prep: mask  Needle  Needle type: Tuohy   Needle gauge: 18 G  Needle length: 3.5 in  Needle insertion depth: 5 cm  Catheter type: side hole  Catheter size: 19 G  Catheter at skin depth: 9 cm  Test dose: negative  Assessment  Hemodynamics: stable  Attempts: 1

## 2021-03-25 NOTE — PLAN OF CARE
Problem: Anxiety:  Goal: Level of anxiety will decrease  Description: Level of anxiety will decrease  3/24/2021 1955 by Zoe Alexander RN  Note: Pt calm and cooperative. Discussing POC with pt. Denies questions     Problem: Breathing Pattern - Ineffective:  Goal: Able to breathe comfortably  Description: Able to breathe comfortably  3/24/2021 1955 by Zoe Alexander RN  Note: RR even and unlabored, pulse ox to remain >94%. Problem: Fluid Volume - Imbalance:  Goal: Absence of intrapartum hemorrhage signs and symptoms  Description: Absence of intrapartum hemorrhage signs and symptoms  3/24/2021 1955 by Zoe Alexander RN  Note: No signs of hemorrhage noted. Vitals stable. Fhr reactive. Problem: Infection - Intrapartum Infection:  Goal: Will show no infection signs and symptoms  Description: Will show no infection signs and symptoms  3/24/2021 1955 by Zoe Alexander RN  Note: Vitals stable, afebrile, GBS positive, PCN per protocol     Problem: Labor Process - Prolonged:  Goal: Uterine contractions within specified parameters  Description: Uterine contractions within specified parameters  3/24/2021 1955 by Zoe Alexander RN  Note: Pitocin titrated for effective labor pattern, continuously monitoring     Problem: Pain - Acute:  Goal: Able to cope with pain  Description: Able to cope with pain  3/24/2021 1955 by Zoe Alexander RN  Note: Pain goal of 7/10. Stadol IV currently, will plan to get epidural later     Problem: Tissue Perfusion - Uteroplacental, Altered:  Goal: Absence of abnormal fetal heart rate pattern  Description: Absence of abnormal fetal heart rate pattern  3/24/2021 1955 by Zoe Alexander RN  Note:  Fhr reactive, continuously monitored while in labor     Problem: Urinary Retention:  Goal: Urinary elimination within specified parameters  Description: Urinary elimination within specified parameters  3/24/2021 1955 by Zoe Alexander RN  Note: Able to void without issues     Problem: Falls - Risk of:  Goal: Absence of physical injury  Description: Absence of physical injury  3/24/2021 1955 by Wilian Russ RN  Note: Pt aware to call out for assistance to BR. Aware after epidural in place she will not be able to get up until after delivery and epidural removed and sensation returns. Up to BR to void x2 with RN assistance then. Problem: Discharge Planning:  Goal: Discharged to appropriate level of care  Description: Discharged to appropriate level of care  3/24/2021 1955 by Wilian Russ RN  Note: Patient will remain on l/d for two hours after delivery for recovery period. She will then be transferred to postpartum for remainder of stay as long as she remains in stable condition.

## 2021-03-25 NOTE — FLOWSHEET NOTE
Patient actively pushing. RN remains in continuous attendance at the bedside. Assessment & evaluation of fetal heart rate and contraction pattern ongoing by RN via continuous EFM.

## 2021-03-25 NOTE — ANESTHESIA PRE PROCEDURE
Department of Anesthesiology  Preprocedure Note       Name:  Dustin East   Age:  23 y.o.  :  1995                                          MRN:  [id number]         Date:  3/24/2021      Surgeon: * No surgeons listed *    Procedure: * No procedures listed *    Medications prior to admission:   Prior to Admission medications    Medication Sig Start Date End Date Taking?  Authorizing Provider   Prenatal MV-Min-Fe Fum-FA-DHA (PRENATAL 1 PO) Take by mouth   Yes Historical Provider, MD   acetaminophen (TYLENOL) 500 MG tablet Take 1,000 mg by mouth every 6 hours as needed for Pain   Yes Historical Provider, MD       Current medications:    Current Facility-Administered Medications   Medication Dose Route Frequency Provider Last Rate Last Admin    oxytocin (PITOCIN) 30 units in 500 mL infusion  1 cisco-units/min Intravenous Continuous Alta Rodrigues MD 10 mL/hr at 21 2239 10 cisco-units/min at 21 2239    terbutaline (BRETHINE) injection 0.25 mg  0.25 mg Subcutaneous Once PRN Jarrod Carroll MD        lactated ringers infusion   Intravenous Continuous Jarrod Carroll  mL/hr at 21 2241 New Bag at 21 2241    sodium chloride flush 0.9 % injection 10 mL  10 mL Intravenous 2 times per day Jarrod Carroll MD        sodium chloride flush 0.9 % injection 10 mL  10 mL Intravenous PRN Jarrod Carroll MD        lidocaine PF 1 % injection 30 mL  30 mL Other PRN Jarrod Carroll MD        butorphanol (STADOL) injection 1 mg  1 mg Intravenous Q1H PRN Jarrod Carroll MD   1 mg at 21 2111    nitrous oxide 50% inhalation 1 each  1 each Inhalation Continuous PRN Jarrod Carroll MD        ondansetron [facility]) injection 8 mg  8 mg Intravenous Q6H PRN Jarrod Carroll MD        diphenhydrAMINE (BENADRYL) injection 25 mg  25 mg Intravenous Q4H PRN Jarrod Carroll MD        oxytocin (PITOCIN) 10 unit bolus from the bag  10 Units Intravenous PRN Tasha Vega MD        And    oxytocin (PITOCIN) 30 units in 500 mL infusion  87.3 cisco-units/min Intravenous PRN Tasha Vega MD        methylergonovine (METHERGINE) injection 200 mcg  200 mcg Intramuscular PRN Tasha Vega MD        carboprost (HEMABATE) injection 250 mcg  250 mcg Intramuscular PRN Tasha Vega MD        miSOPROStol (CYTOTEC) tablet 1,000 mcg  1,000 mcg Rectal PRN Tasha Vega MD        acetaminophen (TYLENOL) tablet 650 mg  650 mg Oral Q4H PRN Tasha Vega MD        ibuprofen (ADVIL;MOTRIN) tablet 800 mg  800 mg Oral Q8H PRN Tasha Vega MD        morphine (PF) injection 2 mg  2 mg Intravenous Q2H PRN Tasha Vega MD        Or    morphine injection 4 mg  4 mg Intravenous Q2H PRN Pixie December, MD        witch hazel-glycerin (TUCKS) pad   Topical PRN Tasha Vega MD        benzocaine-menthol (DERMOPLAST) 20-0.5 % spray   Topical PRN Tasha Vega MD        penicillin G potassium IVPB 3 Million Units  3 Million Units Intravenous Q4H Tasha Vega  mL/hr at 03/24/21 2018 3 Million Units at 03/24/21 2018    naloxone [facility]) injection 0.4 mg  0.4 mg Intravenous PRN Julene Fothergill Pasion, DO        nalbuphine (NUBAIN) injection 5 mg  5 mg Intravenous Q4H PRN Julene Fothergill Pasion, DO        ondansetron [facility]) injection 4 mg  4 mg Intravenous Q6H PRN Julene Fothergill Pasion, DO        [START ON 3/25/2021] fentaNYL 750 mcg, ropivacaine 0.1% in sodium chloride 0.9% 265mL (OB) epidural  18 mL/hr Epidural Continuous Jeremi Covarrubias DO           Allergies:  No Known Allergies    Problem List:    Patient Active Problem List   Diagnosis Code    Renal colic T95    Oligohydramnios O41.00X0    Pyelonephritis, acute N10    Urinary tract obstruction by kidney stone N20.0, N13.8    13 weeks gestation of pregnancy Z3A.13    Left lower quadrant abdominal pain R10.32    Hydronephrosis of left kidney N13.30    Left ureteral stone N20.1    Nausea and vomiting R11.2    Leukocytosis D72.829    Normocytic anemia D64.9    Hypokalemia Q37.2    Metabolic acidosis I45.0    Marijuana abuse F12.10    Kidney stone N20.0    Nephrolithiasis N20.0    Acute cystitis without hematuria N30.00    Pregnancy with 29 completed weeks gestation Z3A.29    False labor O47.9    Normal vaginal delivery O80    Nausea & vomiting R11.2    Abdominal pain R10.9       Past Medical History:        Diagnosis Date    Diabetes mellitus ([address]     diet controlled, gestational with last pregnancy in 2020     Kidney stone 2015    thinks left side and Dr. Marianela Wren blasted it per patient    Kidney stone 12/02/2019    on the left side and 2 cms in size.     PONV (postoperative nausea and vomiting)     nausea with kidney stones     Postpartum depression     with previous pregnancies       Past Surgical History:        Procedure Laterality Date    CYSTO/URETERO/PYELOSCOPY, CALCULUS TX Left 12/2/2019    CYSTOCOPY, LEFT STENT placement  performed by Ed Laurent MD at [place] [address] Left 3/21/2020    CYSTOSCOPY, LEFT URETERAL STENT EXCHANGE performed by Tyrone Mota MD at [address] [place] [id number] / [facility] / Nicole Mcclure Left 1/27/2020    CYSTOSCOPY WITH LEFT URETERAL STENT EXCHANGE UNDER ULTRASOUND GUIDANCE performed by Ed Laurent MD at [address] / Parmjit Crain / STONE N/A 3/2/2020    CYSTOSCOPY WITH ULTRASOUND GUIDED LEFT URETERAL STENT EXCHANGE performed by Ed Laurent MD at [address] [place] [id number] / Parmjit Galas / STONE Left 3/26/2020    CYSTOSCOPY, LEFT STENT REMOVAL performed by Ree Hines MD at [address]  12/21/15    miscarriage    OTHER SURGICAL HISTORY  16 April 2015    Cystoscopy with right ureteroscopy basket stone removal stent placement (Dr. Irasema Franks, [facility])   Jovani De Anda Bilateral     age 5 or 7    TYMPANOSTOMY TUBE PLACEMENT         Social History:    Social History     Tobacco Use    Smoking status: Former Smoker     Start date:      Quit date: 2019     Years since quittin.3    Smokeless tobacco: Never Used    Tobacco comment: Vape   Substance Use Topics    Alcohol use: Not Currently                                Counseling given: Not Answered  Comment: Vape      Vital Signs (Current):   Vitals:    21 2225 21 2230 21 2255 21 2300   BP:  109/71  106/68   Pulse: 106  106    Resp: 18      Temp:       TempSrc:       SpO2:  99%     Weight:       Height:                                                  BP Readings from Last 3 Encounters:   21 106/68   21 104/67   21 (!) 98/53       NPO Status: Time of last liquid consumption: 1320                        Time of last solid consumption: 1000                        Date of last liquid consumption: 21                        Date of last solid food consumption: 21    BMI:   Wt Readings from Last 3 Encounters:   21 154 lb (69.9 kg)   21 151 lb (68.5 kg)   21 151 lb (68.5 kg)     Body mass index is 30.08 kg/m². CBC:   Lab Results   Component Value Date    WBC 11.1 2021    RBC 3.92 2021    RBC 3.68 2020    HGB 10.3 2021    HCT 33.6 2021    MCV 85.7 2021    RDW 14.6 2020     2021       CMP:   Lab Results   Component Value Date     2021    K 3.7 2021    K 3.3 2020     2021    CO2 22 2021    BUN 6 2021    CREATININE 0.8 2021    LABGLOM 88 2021    GLUCOSE 94 2021    PROT 6.3 2021    CALCIUM 9.0 2021    BILITOT <0.2 2021    ALKPHOS 77 2021    AST 11 2021    ALT 6 2021       POC Tests: No results for input(s): POCGLU, POCNA, POCK, POCCL, POCBUN, POCHEMO, POCHCT in the last 72 hours.     Coags:   Lab Results Component Value Date    INR 1.09 12/02/2019       HCG (If Applicable):   Lab Results   Component Value Date    PREGTESTUR NEGATIVE 08/21/2018    PREGSERUM NEGATIVE 04/29/2019        ABGs: No results found for: PHART, PO2ART, LOT3GAP, GGO2OHE, BEART, Z4HRTVCR     Type & Screen (If Applicable):  Lab Results   Component Value Date    LABABO A 12/04/2020    [address] 03/24/2021       Drug/Infectious Status (If Applicable):  No results found for: HIV, HEPCAB    COVID-19 Screening (If Applicable):   Lab Results   Component Value Date    COVID19 NOT DETECTED 05/08/2020           Anesthesia Evaluation  Patient summary reviewed and Nursing notes reviewed  Airway: Mallampati: II        Dental:          Pulmonary: breath sounds clear to auscultation                             Cardiovascular:  Exercise tolerance: good (>4 METS),           Rhythm: regular  Rate: normal                    Neuro/Psych:   (+) psychiatric history:            GI/Hepatic/Renal:             Endo/Other:    (+) Diabetes, . Abdominal:           Vascular:                                        Anesthesia Plan      epidural     ASA 2             Anesthetic plan and risks discussed with patient and mother.                       [address] [facility],    3/24/2021

## 2021-03-26 PROCEDURE — 6370000000 HC RX 637 (ALT 250 FOR IP): Performed by: OBSTETRICS & GYNECOLOGY

## 2021-03-26 PROCEDURE — 1200000000 HC SEMI PRIVATE

## 2021-03-26 RX ORDER — IBUPROFEN 600 MG/1
600 TABLET ORAL EVERY 6 HOURS PRN
Qty: 30 TABLET | Refills: 1 | COMMUNITY
Start: 2021-03-26 | End: 2022-01-26

## 2021-03-26 RX ADMIN — DOCUSATE SODIUM 100 MG: 100 CAPSULE, LIQUID FILLED ORAL at 08:08

## 2021-03-26 ASSESSMENT — PAIN SCALES - GENERAL: PAINLEVEL_OUTOF10: 2

## 2021-03-26 NOTE — ANESTHESIA POSTPROCEDURE EVALUATION
Department of Anesthesiology  Postprocedure Note    Patient: Jennifer Parisi  MRN: [id number]  YOB: 1995  Date of evaluation: 3/26/2021  Time:  7:31 AM     Procedure Summary     Date: 03/24/21 Room / Location:     [facility]est[facility] Anesthesia Stop: 03/25/21 0344    Procedure: Labor Analgesia Diagnosis:     Scheduled Providers:  Responsible Provider: April Kelly DO    Anesthesia Type: epidural ASA Status: 2          Anesthesia Type: epidural    Abhi Phase I: Abhi Score: 9    Abhi Phase II: Abhi Score: 10    Last vitals: Reviewed and per EMR flowsheets.        Anesthesia Post Evaluation    Patient location during evaluation: floor  Patient participation: complete - patient participated  Level of consciousness: awake and alert  Airway patency: patent  Nausea & Vomiting: no nausea and no vomiting  Complications: no  Cardiovascular status: hemodynamically stable  Respiratory status: acceptable, room air and spontaneous ventilation  Hydration status: stable

## 2021-03-26 NOTE — DISCHARGE SUMMARY
Obstetric Discharge Summary      Pt Name: Lane Sims  MRN: [id number] Tata #: [de-identified]  YOB: 1995        Admitting Diagnosis  23 y.o. Meriasheri Dayna @ 37+6 wks IUP presented with c/o decreased FM, pregnancy complicated by IUGR. Reasons for Admission on 3/24/2021  3:08 PM  Vaginal Delivery    Hospital course: 23 y.o.  @ 37+6 wks IUP presented with c/o decreased FM, pregnancy complicated by IUGR. She progressed well and delivered via . Her post partum course was uncomplicated. She was discharged home on PPD#2 in good condition. .     Postpartum/Operative Complications  none    Discharge Diagnosis  male infant  IUGR  Decreased FM      Discharge Information  Current Discharge Medication List      START taking these medications    Details   ibuprofen (ADVIL;MOTRIN) 600 MG tablet Take 1 tablet by mouth every 6 hours as needed for Pain  Qty: 30 tablet, Refills: 1         CONTINUE these medications which have NOT CHANGED    Details   Prenatal MV-Min-Fe Fum-FA-DHA (PRENATAL 1 PO) Take by mouth      acetaminophen (TYLENOL) 500 MG tablet Take 1,000 mg by mouth every 6 hours as needed for Pain               Diet: regular  Activity: nothing in the vagina for 6 weeks-no sex, no tampons, no douching, no heavy lifting, limited activity for 2-3 weeks  Discharge to:  home  Follow up in 6 wks.     Frank Turner  2:14 PM  3/26/2021

## 2021-03-26 NOTE — CARE COORDINATION
3/26/21, 11:38 AM EDT    DISCHARGE PLANNING EVALUATION    Consult received. See infant chart for assessment.

## 2021-03-26 NOTE — PLAN OF CARE
Problem: Discharge Planning:  Goal: Discharged to appropriate level of care  Description: Discharged to appropriate level of care  3/25/2021 2256 by Ricardo De La Cruz RN  Outcome: Ongoing  Note: D/c to home when medically appropriate. Problem: Constipation:  Goal: Bowel elimination is within specified parameters  Description: Bowel elimination is within specified parameters  3/25/2021 2256 by Ricardo De La Cruz RN  Outcome: Ongoing  Note: Stool softeners BID. Pt is passing gas. Problem: Fluid Volume - Imbalance:  Goal: Absence of postpartum hemorrhage signs and symptoms  Description: Absence of postpartum hemorrhage signs and symptoms  3/25/2021 2256 by Ricardo De La Cruz RN  Outcome: Ongoing  Note: Small amount of bleeding noted with no clots visualized. Problem: Infection - Risk of, Puerperal Infection:  Goal: Will show no infection signs and symptoms  Description: Will show no infection signs and symptoms  3/25/2021 2256 by Ricardo De La Cruz RN  Outcome: Ongoing  Note: No s/s infection noted. Problem: Mood - Altered:  Goal: Mood stable  Description: Mood stable  3/25/2021 2256 by Ricardo De La Cruz RN  Outcome: Ongoing  Note: Calm and cooperative. Problem: Pain - Acute:  Goal: Pain level will decrease  Description: Pain level will decrease  3/25/2021 2256 by Ricardo De La Cruz RN  Outcome: Ongoing  Note: Pain goal 5/10. Pain well-controlled with PO meds, heating pad, rest.     Problem: Falls - Risk of:  Goal: Will remain free from falls  Description: Will remain free from falls  3/25/2021 2256 by Ricardo De La Cruz RN  Outcome: Ongoing  Note: No falls thus far this shift. Pt up ad brianna. Care plan reviewed with patient and she contributes to goal setting and voices understanding of plan of care.

## 2021-03-26 NOTE — PROGRESS NOTES
Infant has not roomed in with mother this shift d/t maternal exhaustion. Benefits of rooming in discussed.

## 2021-03-27 VITALS
SYSTOLIC BLOOD PRESSURE: 122 MMHG | DIASTOLIC BLOOD PRESSURE: 83 MMHG | WEIGHT: 154 LBS | BODY MASS INDEX: 30.23 KG/M2 | HEIGHT: 60 IN | RESPIRATION RATE: 16 BRPM | HEART RATE: 93 BPM | TEMPERATURE: 98.6 F | OXYGEN SATURATION: 99 %

## 2021-03-27 PROCEDURE — 6370000000 HC RX 637 (ALT 250 FOR IP): Performed by: OBSTETRICS & GYNECOLOGY

## 2021-03-27 RX ADMIN — IBUPROFEN 800 MG: 800 TABLET, FILM COATED ORAL at 09:55

## 2021-03-27 NOTE — PROGRESS NOTES
Department of Obstetrics and Gynecology  Labor and Delivery  Attending Post Partum Progress Note    PPD #2    SUBJECTIVE: Feeling well    OBJECTIVE:     Vitals:  /83   Pulse 93   Temp 98.6 °F (37 °C) (Oral)   Resp 16   Ht 5' (1.524 m)   Wt 154 lb (69.9 kg)   SpO2 99%   Breastfeeding Unknown   BMI 30.08 kg/m²     Uterus:  normal size, well involuted, firm, non-tender    DATA:     No results found for this or any previous visit (from the past 24 hour(s)). ASSESSMENT & PLAN:  Doing well. Plan discharge on day 2.     Electronically signed by Dipika Schultz MD on 3/27/2021 at 10:36 AM

## 2021-03-27 NOTE — FLOWSHEET NOTE
Post birth warning signs education paper given and reviewed, teaching complete. [place] postpartum depression screening discussed with patient, instructed to contact her healthcare provider if her score is > 10. Patient voiced understanding. Mother's blood type is A+. Baby's blood type is n/a. Mother did not receive Rhogam on n/a. Infant has roomed in with mother this shift  Benefits of rooming in discussed.

## 2021-03-27 NOTE — PLAN OF CARE
Problem: Discharge Planning:  Goal: Discharged to appropriate level of care  Description: Discharged to appropriate level of care  3/27/2021 1041 by Addison Wolff RN  Outcome: Ongoing  Note: Plan is to be discharged home with mother today. Problem: Constipation:  Goal: Bowel elimination is within specified parameters  Description: Bowel elimination is within specified parameters  3/27/2021 1041 by Radha Nash RN  Outcome: Ongoing  Note: Patient passing gas. Problem: Fluid Volume - Imbalance:  Goal: Absence of postpartum hemorrhage signs and symptoms  Description: Absence of postpartum hemorrhage signs and symptoms  3/27/2021 1041 by Radha Nash RN  Outcome: Ongoing  Note: Vaginal bleeding WNL, no clots or foul odors. Problem: Infection - Risk of, Puerperal Infection:  Goal: Will show no infection signs and symptoms  Description: Will show no infection signs and symptoms  3/27/2021 1041 by Radha Nash RN  Outcome: Ongoing  Note: Afebrile this shift. Problem: Mood - Altered:  Goal: Mood stable  Description: Mood stable  3/27/2021 1041 by Radha Nash RN  Outcome: Ongoing  Note: Emotional support provided. Problem: Pain - Acute:  Goal: Pain level will decrease  Description: Pain level will decrease  3/27/2021 1041 by Radha Nash RN  Outcome: Ongoing     Problem: Falls - Risk of:  Goal: Will remain free from falls  Description: Will remain free from falls  Outcome: Completed   Care plan reviewed with patient and she contributes to goal setting and voices understanding of plan of care.

## 2021-05-17 NOTE — PROGRESS NOTES
[address] [place] [address]                                NON STRESS TEST    PATIENT NAME: WILEY SELBY                   :        1995  MED REC NO:   [id number]                           ROOM:       0002  ACCOUNT NO:   [de-identified]                           ADMIT DATE: 2021  PROVIDER:     Kiki Zhang. RENATA Mtz:  2021    INDICATIONS:  The patient is a 72-year-old G4, P2 at 39 and 5/7 weeks'  gestation who presented with abdominal cramping. While being evaluated,  she had a reactive nonstress test and was allowed to be discharged home.         Luisa Ledesma M.D.    D: 2021 13:51:22       T: 2021 18:14:29     [email or web address]  Job#: [id number]     Doc#: [id number]    CC:

## 2021-08-02 ENCOUNTER — OFFICE VISIT (OUTPATIENT)
Dept: FAMILY MEDICINE CLINIC | Age: 26
End: 2021-08-02
Payer: COMMERCIAL

## 2021-08-02 VITALS
RESPIRATION RATE: 16 BRPM | DIASTOLIC BLOOD PRESSURE: 70 MMHG | WEIGHT: 152.8 LBS | BODY MASS INDEX: 29.84 KG/M2 | TEMPERATURE: 98.2 F | HEART RATE: 66 BPM | SYSTOLIC BLOOD PRESSURE: 108 MMHG | OXYGEN SATURATION: 98 %

## 2021-08-02 DIAGNOSIS — Z00.00 ANNUAL PHYSICAL EXAM: Primary | ICD-10-CM

## 2021-08-02 DIAGNOSIS — G25.81 RESTLESS LEGS: ICD-10-CM

## 2021-08-02 DIAGNOSIS — F32.0 MILD MAJOR DEPRESSION (HCC): ICD-10-CM

## 2021-08-02 DIAGNOSIS — F41.9 ANXIETY: ICD-10-CM

## 2021-08-02 DIAGNOSIS — Z13.31 POSITIVE DEPRESSION SCREENING: ICD-10-CM

## 2021-08-02 PROCEDURE — 99395 PREV VISIT EST AGE 18-39: CPT | Performed by: NURSE PRACTITIONER

## 2021-08-02 PROCEDURE — G8431 POS CLIN DEPRES SCRN F/U DOC: HCPCS | Performed by: NURSE PRACTITIONER

## 2021-08-02 SDOH — ECONOMIC STABILITY: FOOD INSECURITY: WITHIN THE PAST 12 MONTHS, YOU WORRIED THAT YOUR FOOD WOULD RUN OUT BEFORE YOU GOT MONEY TO BUY MORE.: NEVER TRUE

## 2021-08-02 SDOH — ECONOMIC STABILITY: FOOD INSECURITY: WITHIN THE PAST 12 MONTHS, THE FOOD YOU BOUGHT JUST DIDN'T LAST AND YOU DIDN'T HAVE MONEY TO GET MORE.: NEVER TRUE

## 2021-08-02 ASSESSMENT — ENCOUNTER SYMPTOMS
ALLERGIC/IMMUNOLOGIC NEGATIVE: 1
NAUSEA: 0
TROUBLE SWALLOWING: 0
EYE PAIN: 0
SORE THROAT: 0
SHORTNESS OF BREATH: 0
ABDOMINAL PAIN: 0
EYE DISCHARGE: 0
BACK PAIN: 0
RHINORRHEA: 0
CONSTIPATION: 0
COUGH: 0
WHEEZING: 0
VOMITING: 0
DIARRHEA: 0
EYE REDNESS: 0

## 2021-08-02 ASSESSMENT — PATIENT HEALTH QUESTIONNAIRE - PHQ9
SUM OF ALL RESPONSES TO PHQ QUESTIONS 1-9: 19
7. TROUBLE CONCENTRATING ON THINGS, SUCH AS READING THE NEWSPAPER OR WATCHING TELEVISION: 0
9. THOUGHTS THAT YOU WOULD BE BETTER OFF DEAD, OR OF HURTING YOURSELF: 0
8. MOVING OR SPEAKING SO SLOWLY THAT OTHER PEOPLE COULD HAVE NOTICED. OR THE OPPOSITE, BEING SO FIGETY OR RESTLESS THAT YOU HAVE BEEN MOVING AROUND A LOT MORE THAN USUAL: 1
1. LITTLE INTEREST OR PLEASURE IN DOING THINGS: 3
SUM OF ALL RESPONSES TO PHQ QUESTIONS 1-9: 19
6. FEELING BAD ABOUT YOURSELF - OR THAT YOU ARE A FAILURE OR HAVE LET YOURSELF OR YOUR FAMILY DOWN: 3
2. FEELING DOWN, DEPRESSED OR HOPELESS: 3
3. TROUBLE FALLING OR STAYING ASLEEP: 3
SUM OF ALL RESPONSES TO PHQ QUESTIONS 1-9: 19
4. FEELING TIRED OR HAVING LITTLE ENERGY: 3
5. POOR APPETITE OR OVEREATING: 3
SUM OF ALL RESPONSES TO PHQ9 QUESTIONS 1 & 2: 6
10. IF YOU CHECKED OFF ANY PROBLEMS, HOW DIFFICULT HAVE THESE PROBLEMS MADE IT FOR YOU TO DO YOUR WORK, TAKE CARE OF THINGS AT HOME, OR GET ALONG WITH OTHER PEOPLE: 1

## 2021-08-02 ASSESSMENT — COLUMBIA-SUICIDE SEVERITY RATING SCALE - C-SSRS
1. WITHIN THE PAST MONTH, HAVE YOU WISHED YOU WERE DEAD OR WISHED YOU COULD GO TO SLEEP AND NOT WAKE UP?: NO
2. HAVE YOU ACTUALLY HAD ANY THOUGHTS OF KILLING YOURSELF?: NO
6. HAVE YOU EVER DONE ANYTHING, STARTED TO DO ANYTHING, OR PREPARED TO DO ANYTHING TO END YOUR LIFE?: NO

## 2021-08-02 ASSESSMENT — SOCIAL DETERMINANTS OF HEALTH (SDOH): HOW HARD IS IT FOR YOU TO PAY FOR THE VERY BASICS LIKE FOOD, HOUSING, MEDICAL CARE, AND HEATING?: NOT HARD AT ALL

## 2021-08-02 NOTE — PROGRESS NOTES
2021    Marlon Du (:  1995) is a 27 y.o. female, here for a preventive medicine evaluation, and primary complaint of restless leg-like problems. She states for the last several months she has had increasing sensation in both lower legs and feet of \"numbness and tingling\" that forces her out of bed to walk around, or move her legs constantly while she is in bed. She states this happens only at nighttime. Her mother has a history and is under treatment for restless leg syndrome. She has no pain, no actual muscle cramping. She is 4 months postpartum and does not have any history of this prior to the delivery of her third child. Patient Active Problem List   Diagnosis    Oligohydramnios    Normocytic anemia    Cannabis abuse    Restless legs    Anxiety    Mild major depression ([facility] [address]       Review of Systems   Constitutional: Negative for activity change, fatigue and fever. HENT: Negative for congestion, ear pain, rhinorrhea, sore throat and trouble swallowing. Eyes: Negative for pain, discharge and redness. Respiratory: Negative for cough, shortness of breath and wheezing. Cardiovascular: Negative. Gastrointestinal: Negative for abdominal pain, constipation, diarrhea, nausea and vomiting. Endocrine: Negative. Genitourinary: Negative for dysuria, frequency and urgency. Musculoskeletal: Negative for arthralgias, back pain and myalgias. Restless legs at night   Skin: Negative for rash. Allergic/Immunologic: Negative. Neurological: Negative for dizziness, tremors, weakness and headaches. Hematological: Negative. Psychiatric/Behavioral: Negative for dysphoric mood and sleep disturbance. The patient is not nervous/anxious. Prior to Visit Medications    Medication Sig Taking?  Authorizing Provider   ibuprofen (ADVIL;MOTRIN) 600 MG tablet Take 1 tablet by mouth every 6 hours as needed for Pain Yes Raymundo Campbell MD   acetaminophen (TYLENOL) 500 MG tablet Take 1,000 mg by mouth every 6 hours as needed for Pain Yes Historical Provider, MD        No Known Allergies    Past Medical History:   Diagnosis Date    Diabetes mellitus ([address]     diet controlled, gestational with last pregnancy in 2020     Kidney stone 2015    thinks left side and Dr. Susan Yeung blasted it per patient    Kidney stone 12/02/2019    on the left side and 2 cms in size.     PONV (postoperative nausea and vomiting)     nausea with kidney stones     Postpartum depression     with previous pregnancies       Past Surgical History:   Procedure Laterality Date    CYSTO/URETERO/PYELOSCOPY, CALCULUS TX Left 12/2/2019    CYSTOCOPY, LEFT STENT placement  performed by Alexsandra Herman MD at [place] [address] Left 3/21/2020    CYSTOSCOPY, LEFT URETERAL STENT EXCHANGE performed by Merced Jansen MD at [address] / Aleksandra Orta Left 1/27/2020    CYSTOSCOPY WITH LEFT URETERAL STENT EXCHANGE UNDER ULTRASOUND GUIDANCE performed by Alexsandra Herman MD at [address] / STONE N/A 3/2/2020    CYSTOSCOPY WITH ULTRASOUND GUIDED LEFT URETERAL STENT EXCHANGE performed by Alexsandra Herman MD at [facility] / REMOVAL STENT / STONE Left 3/26/2020    CYSTOSCOPY, LEFT STENT REMOVAL performed by Barbara Zhao MD at [place] [address]  12/21/15    miscarriage    OTHER SURGICAL HISTORY  16 April 2015    Cystoscopy with right ureteroscopy basket stone removal stent placement (Dr. Jon Oquendo, [facility])    TONSILLECTOMY Bilateral     age 5 or 13    TUBAL LIGATION      TYMPANOSTOMY TUBE PLACEMENT         Social History     Socioeconomic History    Marital status: Single     Spouse name: Not on file    Number of children: Not on file    Years of education: Not on file    Highest education level: Not on file   Occupational History    Not on file   Tobacco Use    Smoking status: Former Smoker     Start date:      Quit date: 2019     Years since quittin.6    Smokeless tobacco: Never Used    Tobacco comment: Vape   Vaping Use    Vaping Use: Former    Substances: Always (3 mg)   Substance and Sexual Activity    Alcohol use: Not Currently    Drug use: Not Currently     Frequency: 2.0 times per week     Types: Marijuana     Comment: in beginning of pregnancy before she knew    Sexual activity: Not Currently     Partners: Male   Other Topics Concern    Not on file   Social History Narrative    Not on file     Social Determinants of Health     Financial Resource Strain: Low Risk     Difficulty of Paying Living Expenses: Not hard at all   Food Insecurity: No Food Insecurity    Worried About [address] in the Last Year: Never true    [address] in the Last Year: Never true   Transportation Needs:     Lack of Transportation (Medical):  Lack of Transportation (Non-Medical):    Physical Activity:     Days of Exercise per Week:     Minutes of Exercise per Session:    Stress:     Feeling of Stress :    Social Connections:     Frequency of Communication with Friends and Family:     Frequency of Social Gatherings with Friends and Family:     Attends Jew Services:     Active Member of Clubs or Organizations:     Attends Club or Organization Meetings:     Marital Status:    Intimate Partner Violence:     Fear of Current or Ex-Partner:     Emotionally Abused:     Physically Abused:     Sexually Abused:         Family History   Problem Relation Age of Onset    Heart Disease Father     Heart Disease Paternal Grandfather        ADVANCE DIRECTIVE: N, <no information>    Vitals:    21 1300   BP: 108/70   Site: Right Upper Arm   Pulse: 66   Resp: 16   Temp: 98.2 °F (36.8 °C)   TempSrc: Oral   SpO2: 98%   Weight: 152 lb 12.8 oz (69.3 kg)     Estimated body mass index is 29.84 kg/m² as calculated from the following:    Height as of 3/24/21: 5' (1.524 m). Weight as of this encounter: 152 lb 12.8 oz (69.3 kg). Physical Exam  Vitals reviewed. Constitutional:       General: She is not in acute distress. Appearance: Normal appearance. She is well-developed. She is not toxic-appearing or diaphoretic. HENT:      Head: Normocephalic. No right periorbital erythema or left periorbital erythema. Jaw: No trismus. Right Ear: Hearing, tympanic membrane, ear canal and external ear normal.      Left Ear: Hearing, tympanic membrane, ear canal and external ear normal.      Nose: Nose normal. No mucosal edema or rhinorrhea. Mouth/Throat:      Mouth: Mucous membranes are moist.      Dentition: Normal dentition. Pharynx: Uvula midline. No posterior oropharyngeal erythema. Tonsils: No tonsillar exudate. 0 on the right. 0 on the left. Eyes:      General: Lids are normal.         Right eye: No discharge. Left eye: No discharge. Conjunctiva/sclera: Conjunctivae normal.      Right eye: Right conjunctiva is not injected. No chemosis. Left eye: No chemosis. Pupils: Pupils are equal, round, and reactive to light. Neck:      Vascular: No JVD. Trachea: Trachea normal.   Cardiovascular:      Rate and Rhythm: Normal rate and regular rhythm. Heart sounds: Normal heart sounds. No murmur heard. Pulmonary:      Effort: Pulmonary effort is normal. No respiratory distress. Breath sounds: Normal breath sounds. No stridor. No wheezing. Abdominal:      General: Bowel sounds are normal. There is no distension. Palpations: Abdomen is soft. Tenderness: There is no abdominal tenderness. Musculoskeletal:         General: No tenderness or signs of injury. Normal range of motion. Cervical back: Full passive range of motion without pain and normal range of motion. Lymphadenopathy:      Cervical: No cervical adenopathy. Skin:     General: Skin is warm and dry.       Capillary Refill: Capillary refill takes less than 2 seconds. Findings: No rash. Neurological:      Mental Status: She is alert and oriented to person, place, and time. GCS: GCS eye subscore is 4. GCS verbal subscore is 5. GCS motor subscore is 6. Cranial Nerves: No cranial nerve deficit. Coordination: Coordination normal.      Gait: Gait normal.   Psychiatric:         Mood and Affect: Mood is not anxious or depressed. Speech: Speech normal.         Behavior: Behavior normal. Behavior is not withdrawn or hyperactive. Behavior is cooperative. Thought Content: Thought content normal.         Judgment: Judgment normal.         No flowsheet data found. Lab Results   Component Value Date    GLUF 86 05/24/2021    GLUCOSE 94 03/16/2021       The ASCVD Risk score (Garret Roberts, et al., 2013) failed to calculate for the following reasons:     The 2013 ASCVD risk score is only valid for ages 36 to 78    Immunization History   Administered Date(s) Administered    DTaP vaccine 1995, 1995, 09/17/1998, 08/31/2001    HPV Bivalent (Cervarix) 08/25/2010    Hepatitis A Ped/Adol (Havrix, Vaqta) 08/25/2010    Hepatitis B Ped/Adol (Engerix-B, Recombivax HB) 1995, 1995, 09/17/1998    Hib (HbOC) 1995, 1995, 09/17/1998    Influenza Virus Vaccine 10/21/2019    MMR 09/17/1998, 08/31/2001    Meningococcal MCV4P (Menactra) 08/25/2010    Polio IPV (IPOL) 08/31/2001    Polio OPV 1997, 1997, 09/17/1998    Tdap (Boostrix, Adacel) 08/25/2010    Varicella (Varivax) 08/25/2010       Health Maintenance   Topic Date Due    Hepatitis A vaccine (2 of 2 - 2-dose series) 02/25/2011    HPV vaccine (2 - 2-dose series) 02/25/2011    Cervical cancer screen  Never done    Chlamydia screen  10/04/2020    COVID-19 Vaccine (1) 08/01/2022 (Originally 5/4/2009)    DTaP/Tdap/Td vaccine (6 - Td or Tdap) 08/02/2030 (Originally 8/25/2020)    Flu vaccine (1) 09/01/2021    Hepatitis B vaccine  Completed    Hib vaccine  Completed    HIV screen  Completed    Meningococcal (ACWY) vaccine  Aged Out    Pneumococcal 0-64 years Vaccine  Aged Out    Varicella vaccine  Discontinued    Hepatitis C screen  Discontinued          ASSESSMENT/PLAN:  1. Annual physical exam  -     Basic Metabolic Panel; Future  -     Hepatic Function Panel; Future  -     CBC Auto Differential; Future  -     Lipid Panel; Future  -     Positive Screen for Clinical Depression with a Documented Follow-up Plan   2. Restless legs  -     TSH without Reflex; Future  -     T4, Free; Future  -     Vitamin B12 & Folate; Future  -     Vitamin D 25 Hydroxy; Future  3. Anxiety  -     TSH without Reflex; Future  -     T4, Free; Future  -     Vitamin B12 & Folate; Future  -     Vitamin D 25 Hydroxy; Future  4. Mild major depression ([address]  5. Positive depression screening  -     Positive Screen for Clinical Depression with a Documented Follow-up Plan     If patient's labs are all normal and not indicative of underlying cause for restless legs we will treat her with medication at that point. Return in about 1 year (around 8/2/2022). An electronic signature was used to authenticate this note. --LEV Buckner - CNP on 8/2/2021 at 1:22 PM     On the basis of positive PHQ-9 screening (PHQ-9 Total Score: 19), the following plan was implemented: patient declines further evaluation/treatment for depression. Patient will follow-up in 1 year(s) with PCP.

## 2021-08-03 LAB
ABSOLUTE BASO #: 0.1 X10E9/L (ref 0–0.2)
ABSOLUTE EOS #: 0.2 X10E9/L (ref 0–0.4)
ABSOLUTE LYMPH #: 2.3 X10E9/L (ref 1–3.5)
ABSOLUTE MONO #: 0.6 X10E9/L (ref 0–0.9)
ABSOLUTE NEUT #: 3.8 X10E9/L (ref 1.5–6.6)
ALBUMIN SERPL-MCNC: 4.3 G/DL (ref 3.2–5.3)
ALK PHOSPHATASE: 52 U/L (ref 39–130)
ALT SERPL-CCNC: 15 U/L (ref 0–31)
ANION GAP SERPL CALCULATED.3IONS-SCNC: 6 MMOL/L (ref 5–15)
AST SERPL-CCNC: 16 U/L (ref 0–41)
BASOPHILS RELATIVE PERCENT: 1 %
BILIRUB SERPL-MCNC: 0.4 MG/DL (ref 0.3–1.2)
BILIRUBIN DIRECT: 0.1 MG/DL (ref 0–0.4)
BUN BLDV-MCNC: 11 MG/DL (ref 5–23)
CALCIUM SERPL-MCNC: 8.8 MG/DL (ref 8.5–10.5)
CHLORIDE BLD-SCNC: 107 MMOL/L (ref 98–109)
CHOLESTEROL/HDL RATIO: 3.9 (ref 1–5)
CHOLESTEROL: 182 MG/DL (ref 150–200)
CO2: 26 MMOL/L (ref 22–32)
CREAT SERPL-MCNC: 1.25 MG/DL (ref 0.4–1)
EGFR AFRICAN AMERICAN: >60 ML/MIN/1.73SQ.M
EGFR IF NONAFRICAN AMERICAN: 53 ML/MIN/1.73SQ.M
EOSINOPHILS RELATIVE PERCENT: 2.2 %
FOLATE: 12.1 NG/ML
GLUCOSE: 76 MG/DL (ref 65–99)
HCT VFR BLD CALC: 38.3 % (ref 35–47)
HDLC SERPL-MCNC: 47 MG/DL
HEMOGLOBIN: 12.7 G/DL (ref 11.7–15.5)
LDL CHOLESTEROL CALCULATED: 88 MG/DL
LDL/HDL RATIO: 1.9
LYMPHOCYTE %: 34.1 %
MCH RBC QN AUTO: 28.6 PG (ref 27–34)
MCHC RBC AUTO-ENTMCNC: 33.2 G/DL (ref 32–36)
MCV RBC AUTO: 86 FL (ref 80–100)
MONOCYTES # BLD: 8 %
NEUTROPHILS RELATIVE PERCENT: 54.7 %
PDW BLD-RTO: 15.1 % (ref 11.5–15)
PLATELETS: 238 X10E9/L (ref 150–450)
PMV BLD AUTO: 9 FL (ref 7–12)
POTASSIUM SERPL-SCNC: 4.2 MMOL/L (ref 3.5–5)
RBC: 4.45 X10E12/L (ref 3.8–5.2)
SODIUM BLD-SCNC: 139 MMOL/L (ref 134–146)
T4 FREE: 0.9 NG/DL (ref 0.61–1.6)
TOTAL PROTEIN: 7.3 G/DL (ref 6–8)
TRIGL SERPL-MCNC: 233 MG/DL (ref 27–150)
TSH SERPL DL<=0.05 MIU/L-ACNC: 3.06 UIU/ML (ref 0.49–4.67)
VITAMIN B-12: 172 PG/ML (ref 180–914)
VITAMIN D 25-HYDROXY: 25.1 NG/ML (ref 30–100)
VLDLC SERPL CALC-MCNC: 47 MG/DL (ref 0–30)
WBC: 6.9 X10E9/L (ref 4–11)

## 2021-08-04 ENCOUNTER — TELEPHONE (OUTPATIENT)
Dept: FAMILY MEDICINE CLINIC | Age: 26
End: 2021-08-04

## 2021-08-04 ENCOUNTER — VIRTUAL VISIT (OUTPATIENT)
Dept: FAMILY MEDICINE CLINIC | Age: 26
End: 2021-08-04
Payer: COMMERCIAL

## 2021-08-04 DIAGNOSIS — F32.0 MILD MAJOR DEPRESSION (HCC): Primary | ICD-10-CM

## 2021-08-04 DIAGNOSIS — E53.8 LOW SERUM VITAMIN B12: ICD-10-CM

## 2021-08-04 PROCEDURE — G8419 CALC BMI OUT NRM PARAM NOF/U: HCPCS | Performed by: NURSE PRACTITIONER

## 2021-08-04 PROCEDURE — G8427 DOCREV CUR MEDS BY ELIG CLIN: HCPCS | Performed by: NURSE PRACTITIONER

## 2021-08-04 PROCEDURE — 1036F TOBACCO NON-USER: CPT | Performed by: NURSE PRACTITIONER

## 2021-08-04 PROCEDURE — 99214 OFFICE O/P EST MOD 30 MIN: CPT | Performed by: NURSE PRACTITIONER

## 2021-08-04 RX ORDER — PAROXETINE 10 MG/1
10 TABLET, FILM COATED ORAL DAILY
Qty: 30 TABLET | Refills: 3 | Status: SHIPPED | OUTPATIENT
Start: 2021-08-04 | End: 2021-12-20

## 2021-08-04 ASSESSMENT — ENCOUNTER SYMPTOMS
COUGH: 0
ABDOMINAL PAIN: 0
SORE THROAT: 0
TROUBLE SWALLOWING: 0
WHEEZING: 0
RHINORRHEA: 0
EYE DISCHARGE: 0
NAUSEA: 0
BACK PAIN: 0
EYE REDNESS: 0
VOMITING: 0
EYE PAIN: 0
ALLERGIC/IMMUNOLOGIC NEGATIVE: 1
DIARRHEA: 0
SHORTNESS OF BREATH: 0
CONSTIPATION: 0

## 2021-08-04 NOTE — TELEPHONE ENCOUNTER
----- Message from LEV Archer CNP sent at 8/4/2021  8:36 AM EDT -----  Please call patient and schedule her for a nurse visit for her first vitamin B 12 injection.   Order can be found in the note dated 8/4/2021

## 2021-08-04 NOTE — PROGRESS NOTES
[address] [place] [address] [address]  [facility] [address]  Dept: [phone]  Dept Fax: [phone]  Loc: [phone]     Visit Date:  8/4/2021      Patient:  Dee Gutierrez  YOB: 1995    HPI:     Chief Complaint   Patient presents with    Abnormal Lab       Patient for video visit for follow-up on recent lab results. Patient's B12 and vitamin D were low, triglycerides were elevated. Patient also requesting medication for depression which we had discussed at her last visit. Medications    Current Outpatient Medications:     PARoxetine (PAXIL) 10 MG tablet, Take 1 tablet by mouth daily, Disp: 30 tablet, Rfl: 3    ibuprofen (ADVIL;MOTRIN) 600 MG tablet, Take 1 tablet by mouth every 6 hours as needed for Pain, Disp: 30 tablet, Rfl: 1    acetaminophen (TYLENOL) 500 MG tablet, Take 1,000 mg by mouth every 6 hours as needed for Pain, Disp: , Rfl:     The patient has No Known Allergies. Past Medical History  Godwin Florez  has a past medical history of Diabetes mellitus ([facility] [address] Kidney stone, Kidney stone, PONV (postoperative nausea and vomiting), and Postpartum depression. Subjective:      Review of Systems   Constitutional: Negative for activity change, fatigue and fever. HENT: Negative for congestion, ear pain, rhinorrhea, sore throat and trouble swallowing. Eyes: Negative for pain, discharge and redness. Respiratory: Negative for cough, shortness of breath and wheezing. Cardiovascular: Negative. Gastrointestinal: Negative for abdominal pain, constipation, diarrhea, nausea and vomiting. Endocrine: Negative. Genitourinary: Negative for dysuria, frequency and urgency. Musculoskeletal: Negative for arthralgias, back pain and myalgias. Skin: Negative for rash. Allergic/Immunologic: Negative. Neurological: Negative for dizziness, tremors, weakness and headaches. Hematological: Negative.     Psychiatric/Behavioral: Positive for dysphoric mood. Negative for sleep disturbance. The patient is not nervous/anxious. Objective:     LMP 07/06/2021     Physical Exam  Constitutional:       General: She is not in acute distress. Appearance: She is not ill-appearing. Neurological:      Mental Status: She is alert and oriented to person, place, and time. Psychiatric:         Mood and Affect: Mood normal.         Behavior: Behavior normal.         Thought Content: Thought content normal.         Judgment: Judgment normal.         Assessment/Plan:      Haris Benitez was seen today for abnormal lab. Diagnoses and all orders for this visit:    Mild major depression (HCC)  -     PARoxetine (PAXIL) 10 MG tablet; Take 1 tablet by mouth daily    Low serum vitamin B12    Patient agreeable to vitamin B12 shots. She will also start taking calcium with vitamin D3 orally daily. Prescription for Paxil sent. Vitamin B12 injection was as follows1000mcg every 2 weeks x 4 injections; then monthly x3 injections; then recheck level. Return in about 4 weeks (around 9/1/2021), or if symptoms worsen or fail to improve. Patient instructions given ronna Mike, was evaluated through a synchronous (real-time) audio-video encounter. The patient (or guardian if applicable) is aware that this is a billable service. Verbal consent to proceed has been obtained within the past 12 months. The visit was conducted pursuant to the emergency declaration under the [facility], [address] [place] [address] authority and the [employer] General Act. Patient identification was verified, and a caregiver was present when appropriate. The patient was located in a state where the provider was credentialed to provide care.     Total time spent for this encounter: 15 min    --LEV Sosa CNP on 8/4/2021 at 9:01 AM    An electronic signature was used to authenticate this note.            Electronically signed by LVE Milian CNP on 8/4/2021 at 8:31 AM

## 2021-08-05 ENCOUNTER — NURSE ONLY (OUTPATIENT)
Dept: FAMILY MEDICINE CLINIC | Age: 26
End: 2021-08-05
Payer: COMMERCIAL

## 2021-08-05 DIAGNOSIS — E53.8 LOW SERUM VITAMIN B12: Primary | ICD-10-CM

## 2021-08-05 PROCEDURE — 96372 THER/PROPH/DIAG INJ SC/IM: CPT | Performed by: NURSE PRACTITIONER

## 2021-08-05 RX ORDER — CYANOCOBALAMIN 1000 UG/ML
1000 INJECTION INTRAMUSCULAR; SUBCUTANEOUS ONCE
Status: COMPLETED | OUTPATIENT
Start: 2021-08-05 | End: 2021-08-05

## 2021-08-05 RX ADMIN — CYANOCOBALAMIN 1000 MCG: 1000 INJECTION INTRAMUSCULAR; SUBCUTANEOUS at 14:30

## 2021-08-05 NOTE — PROGRESS NOTES
Administrations This Visit     cyanocobalamin injection 1,000 mcg     Admin Date  08/05/2021  14:30 Action  Given Dose  1,000 mcg Route  Intramuscular Site  Deltoid Left Administered By  Tomy Mueller RN    Ordering Provider: LEV Nixon CNP    NDC: 06648-695-06    Lot#: [id number]    : [facility]    Patient Supplied?: No            See 8/4/21 encounter for Kaelyn Wolf CNP order.

## 2021-12-13 ENCOUNTER — TELEPHONE (OUTPATIENT)
Dept: UROLOGY | Age: 26
End: 2021-12-13

## 2021-12-13 DIAGNOSIS — N20.0 KIDNEY STONE: Primary | ICD-10-CM

## 2021-12-13 NOTE — TELEPHONE ENCOUNTER
Patient was last seen 6/2020 by Dr Bailee Garcia. She is calling in stating she has had a kidney stone for a while now, but has had back to back pregnancies and has not had it taken care of yet. This past weekend she started have severe left lower back pain, and not feeling well. She said no vomiting, but she does feel hot at times. She said even today she is very sore even to touch her left lower back. Please call her to advise if needs any imaging done at [facility]?  Or if needs office visit first?

## 2021-12-13 NOTE — TELEPHONE ENCOUNTER
Lets get Ua and preg  If preg neg can order Ct Abd  Set up with Dr Elieser Wheatley next available, can be VV    If pain uncontrolled, n/v, fever, she will need to be seen in ER

## 2021-12-14 LAB
PREGNANCY, URINE: NEGATIVE
REPORT STATUS: NORMAL
SITE/TYPE: NORMAL
URINE CULTURE, ROUTINE: NORMAL

## 2021-12-15 RX ORDER — NITROFURANTOIN 25; 75 MG/1; MG/1
100 CAPSULE ORAL 2 TIMES DAILY
Qty: 14 CAPSULE | Refills: 0 | Status: ON HOLD | OUTPATIENT
Start: 2021-12-15 | End: 2021-12-21 | Stop reason: HOSPADM

## 2021-12-15 NOTE — TELEPHONE ENCOUNTER
Patient scheduled for CT ABDOMEN PELVIS WO   at [address] [place] [address] on 12/2921 ARRIVAL  PM FOR A 440 PM SCAN TIME WITH NO PREP. Patient advised of instructions. Order mailed/given to   patient     ATTEMPTED TO CALL THE PATIENT WITH THE INFORMATION BUT HER MAILBOX IF FULL.  PUT THE ORDER IN THE  MAIL

## 2021-12-15 NOTE — TELEPHONE ENCOUNTER
Patient advised of the urine results and Macrobid sent to the pharmacy for treatment. Patient changed follow for after ct scan to review results.

## 2021-12-20 ENCOUNTER — APPOINTMENT (OUTPATIENT)
Dept: CT IMAGING | Age: 26
DRG: 463 | End: 2021-12-20
Payer: MEDICAID

## 2021-12-20 ENCOUNTER — HOSPITAL ENCOUNTER (INPATIENT)
Age: 26
LOS: 1 days | Discharge: HOME | DRG: 463 | End: 2021-12-22
Attending: FAMILY MEDICINE | Admitting: HOSPITALIST
Payer: MEDICAID

## 2021-12-20 DIAGNOSIS — N13.1 HYDRONEPHROSIS DUE TO OBSTRUCTION OF URETER: ICD-10-CM

## 2021-12-20 DIAGNOSIS — F32.0 MILD MAJOR DEPRESSION (HCC): ICD-10-CM

## 2021-12-20 DIAGNOSIS — R10.9 LEFT FLANK PAIN: Primary | ICD-10-CM

## 2021-12-20 LAB
ANION GAP SERPL CALCULATED.3IONS-SCNC: 13 MEQ/L (ref 8–16)
BASOPHILS # BLD: 0.3 %
BASOPHILS ABSOLUTE: 0 THOU/MM3 (ref 0–0.1)
BUN BLDV-MCNC: 11 MG/DL (ref 7–22)
CALCIUM SERPL-MCNC: 9 MG/DL (ref 8.5–10.5)
CHLORIDE BLD-SCNC: 103 MEQ/L (ref 98–111)
CO2: 23 MEQ/L (ref 23–33)
CREAT SERPL-MCNC: 1.1 MG/DL (ref 0.4–1.2)
EOSINOPHIL # BLD: 0.9 %
EOSINOPHILS ABSOLUTE: 0.1 THOU/MM3 (ref 0–0.4)
ERYTHROCYTE [DISTWIDTH] IN BLOOD BY AUTOMATED COUNT: 13.8 % (ref 11.5–14.5)
ERYTHROCYTE [DISTWIDTH] IN BLOOD BY AUTOMATED COUNT: 46.5 FL (ref 35–45)
GFR SERPL CREATININE-BSD FRML MDRD: 61 ML/MIN/1.73M2
GLUCOSE BLD-MCNC: 110 MG/DL (ref 70–108)
HCT VFR BLD CALC: 42.4 % (ref 37–47)
HEMOGLOBIN: 13.8 GM/DL (ref 12–16)
IMMATURE GRANS (ABS): 0.05 THOU/MM3 (ref 0–0.07)
IMMATURE GRANULOCYTES: 0.3 %
LYMPHOCYTES # BLD: 12.8 %
LYMPHOCYTES ABSOLUTE: 1.9 THOU/MM3 (ref 1–4.8)
MCH RBC QN AUTO: 29.7 PG (ref 26–33)
MCHC RBC AUTO-ENTMCNC: 32.5 GM/DL (ref 32.2–35.5)
MCV RBC AUTO: 91.4 FL (ref 81–99)
MONOCYTES # BLD: 6.1 %
MONOCYTES ABSOLUTE: 0.9 THOU/MM3 (ref 0.4–1.3)
NUCLEATED RED BLOOD CELLS: 0 /100 WBC
OSMOLALITY CALCULATION: 277.6 MOSMOL/KG (ref 275–300)
PLATELET # BLD: 254 THOU/MM3 (ref 130–400)
PMV BLD AUTO: 9.9 FL (ref 9.4–12.4)
POTASSIUM SERPL-SCNC: 3.8 MEQ/L (ref 3.5–5.2)
PREGNANCY, SERUM: NEGATIVE
RBC # BLD: 4.64 MILL/MM3 (ref 4.2–5.4)
SEG NEUTROPHILS: 79.6 %
SEGMENTED NEUTROPHILS ABSOLUTE COUNT: 11.7 THOU/MM3 (ref 1.8–7.7)
SODIUM BLD-SCNC: 139 MEQ/L (ref 135–145)
WBC # BLD: 14.7 THOU/MM3 (ref 4.8–10.8)

## 2021-12-20 PROCEDURE — 81001 URINALYSIS AUTO W/SCOPE: CPT

## 2021-12-20 PROCEDURE — 96375 TX/PRO/DX INJ NEW DRUG ADDON: CPT

## 2021-12-20 PROCEDURE — 84703 CHORIONIC GONADOTROPIN ASSAY: CPT

## 2021-12-20 PROCEDURE — 85025 COMPLETE CBC W/AUTO DIFF WBC: CPT

## 2021-12-20 PROCEDURE — 74176 CT ABD & PELVIS W/O CONTRAST: CPT

## 2021-12-20 PROCEDURE — 87086 URINE CULTURE/COLONY COUNT: CPT

## 2021-12-20 PROCEDURE — 83605 ASSAY OF LACTIC ACID: CPT

## 2021-12-20 PROCEDURE — 6360000002 HC RX W HCPCS: Performed by: FAMILY MEDICINE

## 2021-12-20 PROCEDURE — 36415 COLL VENOUS BLD VENIPUNCTURE: CPT

## 2021-12-20 PROCEDURE — 6360000002 HC RX W HCPCS

## 2021-12-20 PROCEDURE — 87186 SC STD MICRODIL/AGAR DIL: CPT

## 2021-12-20 PROCEDURE — 87077 CULTURE AEROBIC IDENTIFY: CPT

## 2021-12-20 PROCEDURE — 80048 BASIC METABOLIC PNL TOTAL CA: CPT

## 2021-12-20 PROCEDURE — 99284 EMERGENCY DEPT VISIT MOD MDM: CPT

## 2021-12-20 RX ORDER — ONDANSETRON 2 MG/ML
INJECTION INTRAMUSCULAR; INTRAVENOUS
Status: COMPLETED
Start: 2021-12-20 | End: 2021-12-20

## 2021-12-20 RX ORDER — ONDANSETRON 2 MG/ML
4 INJECTION INTRAMUSCULAR; INTRAVENOUS ONCE
Status: COMPLETED | OUTPATIENT
Start: 2021-12-21 | End: 2021-12-20

## 2021-12-20 RX ADMIN — ONDANSETRON 4 MG: 2 INJECTION INTRAMUSCULAR; INTRAVENOUS at 23:45

## 2021-12-20 RX ADMIN — HYDROMORPHONE HYDROCHLORIDE 1 MG: 1 INJECTION, SOLUTION INTRAMUSCULAR; INTRAVENOUS; SUBCUTANEOUS at 22:27

## 2021-12-20 ASSESSMENT — PAIN DESCRIPTION - PAIN TYPE: TYPE: ACUTE PAIN

## 2021-12-20 ASSESSMENT — PAIN DESCRIPTION - ORIENTATION: ORIENTATION: LEFT

## 2021-12-20 ASSESSMENT — PAIN DESCRIPTION - FREQUENCY: FREQUENCY: CONTINUOUS

## 2021-12-20 ASSESSMENT — PAIN DESCRIPTION - LOCATION: LOCATION: FLANK

## 2021-12-20 ASSESSMENT — PAIN SCALES - GENERAL
PAINLEVEL_OUTOF10: 0
PAINLEVEL_OUTOF10: 10

## 2021-12-20 ASSESSMENT — PAIN DESCRIPTION - DESCRIPTORS: DESCRIPTORS: CONSTANT

## 2021-12-21 ENCOUNTER — ANESTHESIA (OUTPATIENT)
Dept: OPERATING ROOM | Age: 26
DRG: 463 | End: 2021-12-21
Payer: MEDICAID

## 2021-12-21 ENCOUNTER — ANESTHESIA EVENT (OUTPATIENT)
Dept: OPERATING ROOM | Age: 26
DRG: 463 | End: 2021-12-21
Payer: MEDICAID

## 2021-12-21 VITALS
OXYGEN SATURATION: 98 % | SYSTOLIC BLOOD PRESSURE: 110 MMHG | TEMPERATURE: 97 F | DIASTOLIC BLOOD PRESSURE: 61 MMHG | RESPIRATION RATE: 13 BRPM

## 2021-12-21 PROBLEM — N20.0 NEPHROLITHIASIS: Status: ACTIVE | Noted: 2021-12-21

## 2021-12-21 LAB
ANION GAP SERPL CALCULATED.3IONS-SCNC: 12 MEQ/L (ref 8–16)
BACTERIA: ABNORMAL /HPF
BILIRUBIN URINE: NEGATIVE
BLOOD, URINE: ABNORMAL
BUN BLDV-MCNC: 11 MG/DL (ref 7–22)
CALCIUM SERPL-MCNC: 9.3 MG/DL (ref 8.5–10.5)
CASTS 2: ABNORMAL /LPF
CASTS UA: ABNORMAL /LPF
CHARACTER, URINE: ABNORMAL
CHLORIDE BLD-SCNC: 104 MEQ/L (ref 98–111)
CO2: 21 MEQ/L (ref 23–33)
COLOR: YELLOW
CREAT SERPL-MCNC: 1 MG/DL (ref 0.4–1.2)
CRYSTALS, UA: ABNORMAL
EPITHELIAL CELLS, UA: ABNORMAL /HPF
ERYTHROCYTE [DISTWIDTH] IN BLOOD BY AUTOMATED COUNT: 13.9 % (ref 11.5–14.5)
ERYTHROCYTE [DISTWIDTH] IN BLOOD BY AUTOMATED COUNT: 48.8 FL (ref 35–45)
GFR SERPL CREATININE-BSD FRML MDRD: 68 ML/MIN/1.73M2
GLUCOSE BLD-MCNC: 102 MG/DL (ref 70–108)
GLUCOSE URINE: NEGATIVE MG/DL
HCT VFR BLD CALC: 44.9 % (ref 37–47)
HEMOGLOBIN: 14.1 GM/DL (ref 12–16)
KETONES, URINE: ABNORMAL
LACTIC ACID, SEPSIS: 0.7 MMOL/L (ref 0.5–1.9)
LACTIC ACID, SEPSIS: 0.8 MMOL/L (ref 0.5–1.9)
LEUKOCYTE ESTERASE, URINE: ABNORMAL
MCH RBC QN AUTO: 29.9 PG (ref 26–33)
MCHC RBC AUTO-ENTMCNC: 31.4 GM/DL (ref 32.2–35.5)
MCV RBC AUTO: 95.1 FL (ref 81–99)
MISCELLANEOUS 2: ABNORMAL
NITRITE, URINE: POSITIVE
PH UA: 6 (ref 5–9)
PLATELET # BLD: 251 THOU/MM3 (ref 130–400)
PMV BLD AUTO: 10.4 FL (ref 9.4–12.4)
POTASSIUM REFLEX MAGNESIUM: 5.3 MEQ/L (ref 3.5–5.2)
PROTEIN UA: >= 300
RBC # BLD: 4.72 MILL/MM3 (ref 4.2–5.4)
RBC URINE: > 100 /HPF
RENAL EPITHELIAL, UA: ABNORMAL
SODIUM BLD-SCNC: 137 MEQ/L (ref 135–145)
SPECIFIC GRAVITY, URINE: 1.02 (ref 1–1.03)
UROBILINOGEN, URINE: 0.2 EU/DL (ref 0–1)
WBC # BLD: 10.1 THOU/MM3 (ref 4.8–10.8)
WBC UA: > 200 /HPF
YEAST: ABNORMAL

## 2021-12-21 PROCEDURE — 2580000003 HC RX 258: Performed by: STUDENT IN AN ORGANIZED HEALTH CARE EDUCATION/TRAINING PROGRAM

## 2021-12-21 PROCEDURE — 3600000003 HC SURGERY LEVEL 3 BASE: Performed by: UROLOGY

## 2021-12-21 PROCEDURE — 6360000002 HC RX W HCPCS: Performed by: NURSE ANESTHETIST, CERTIFIED REGISTERED

## 2021-12-21 PROCEDURE — 2580000003 HC RX 258: Performed by: FAMILY MEDICINE

## 2021-12-21 PROCEDURE — 2580000003 HC RX 258: Performed by: UROLOGY

## 2021-12-21 PROCEDURE — 6370000000 HC RX 637 (ALT 250 FOR IP): Performed by: UROLOGY

## 2021-12-21 PROCEDURE — 2709999900 HC NON-CHARGEABLE SUPPLY: Performed by: UROLOGY

## 2021-12-21 PROCEDURE — 85027 COMPLETE CBC AUTOMATED: CPT

## 2021-12-21 PROCEDURE — 36415 COLL VENOUS BLD VENIPUNCTURE: CPT

## 2021-12-21 PROCEDURE — 0T778DZ DILATION OF LEFT URETER WITH INTRALUMINAL DEVICE, VIA NATURAL OR ARTIFICIAL OPENING ENDOSCOPIC: ICD-10-PCS | Performed by: UROLOGY

## 2021-12-21 PROCEDURE — 1200000000 HC SEMI PRIVATE

## 2021-12-21 PROCEDURE — 3700000001 HC ADD 15 MINUTES (ANESTHESIA): Performed by: UROLOGY

## 2021-12-21 PROCEDURE — C2617 STENT, NON-COR, TEM W/O DEL: HCPCS | Performed by: UROLOGY

## 2021-12-21 PROCEDURE — 6360000002 HC RX W HCPCS: Performed by: FAMILY MEDICINE

## 2021-12-21 PROCEDURE — 6360000002 HC RX W HCPCS: Performed by: STUDENT IN AN ORGANIZED HEALTH CARE EDUCATION/TRAINING PROGRAM

## 2021-12-21 PROCEDURE — C1769 GUIDE WIRE: HCPCS | Performed by: UROLOGY

## 2021-12-21 PROCEDURE — 99223 1ST HOSP IP/OBS HIGH 75: CPT | Performed by: HOSPITALIST

## 2021-12-21 PROCEDURE — 3700000000 HC ANESTHESIA ATTENDED CARE: Performed by: UROLOGY

## 2021-12-21 PROCEDURE — 6370000000 HC RX 637 (ALT 250 FOR IP): Performed by: NURSE ANESTHETIST, CERTIFIED REGISTERED

## 2021-12-21 PROCEDURE — 6370000000 HC RX 637 (ALT 250 FOR IP): Performed by: PEDIATRICS

## 2021-12-21 PROCEDURE — 99253 IP/OBS CNSLTJ NEW/EST LOW 45: CPT | Performed by: UROLOGY

## 2021-12-21 PROCEDURE — 6360000002 HC RX W HCPCS: Performed by: UROLOGY

## 2021-12-21 PROCEDURE — 83605 ASSAY OF LACTIC ACID: CPT

## 2021-12-21 PROCEDURE — 80048 BASIC METABOLIC PNL TOTAL CA: CPT

## 2021-12-21 PROCEDURE — 3600000013 HC SURGERY LEVEL 3 ADDTL 15MIN: Performed by: UROLOGY

## 2021-12-21 PROCEDURE — 96365 THER/PROPH/DIAG IV INF INIT: CPT

## 2021-12-21 DEVICE — URETERAL STENT
Type: IMPLANTABLE DEVICE | Site: URETER | Status: FUNCTIONAL
Brand: PERCUFLEX™ PLUS

## 2021-12-21 RX ORDER — HYDROCODONE BITARTRATE AND ACETAMINOPHEN 5; 325 MG/1; MG/1
1 TABLET ORAL EVERY 4 HOURS PRN
Status: DISCONTINUED | OUTPATIENT
Start: 2021-12-21 | End: 2021-12-21

## 2021-12-21 RX ORDER — SODIUM CHLORIDE 9 MG/ML
25 INJECTION, SOLUTION INTRAVENOUS PRN
Status: DISCONTINUED | OUTPATIENT
Start: 2021-12-21 | End: 2021-12-22 | Stop reason: HOSPADM

## 2021-12-21 RX ORDER — SODIUM CHLORIDE 0.9 % (FLUSH) 0.9 %
10 SYRINGE (ML) INJECTION PRN
Status: DISCONTINUED | OUTPATIENT
Start: 2021-12-21 | End: 2021-12-22 | Stop reason: HOSPADM

## 2021-12-21 RX ORDER — OXYCODONE HYDROCHLORIDE AND ACETAMINOPHEN 5; 325 MG/1; MG/1
1 TABLET ORAL EVERY 6 HOURS PRN
Status: DISCONTINUED | OUTPATIENT
Start: 2021-12-21 | End: 2021-12-22 | Stop reason: HOSPADM

## 2021-12-21 RX ORDER — PROPOFOL 10 MG/ML
INJECTION, EMULSION INTRAVENOUS PRN
Status: DISCONTINUED | OUTPATIENT
Start: 2021-12-21 | End: 2021-12-21 | Stop reason: SDUPTHER

## 2021-12-21 RX ORDER — ONDANSETRON 4 MG/1
4 TABLET, ORALLY DISINTEGRATING ORAL EVERY 8 HOURS PRN
Status: DISCONTINUED | OUTPATIENT
Start: 2021-12-21 | End: 2021-12-22 | Stop reason: HOSPADM

## 2021-12-21 RX ORDER — ACETAMINOPHEN 325 MG/1
650 TABLET ORAL EVERY 6 HOURS PRN
Status: DISCONTINUED | OUTPATIENT
Start: 2021-12-21 | End: 2021-12-22 | Stop reason: HOSPADM

## 2021-12-21 RX ORDER — SCOLOPAMINE TRANSDERMAL SYSTEM 1 MG/1
PATCH, EXTENDED RELEASE TRANSDERMAL PRN
Status: DISCONTINUED | OUTPATIENT
Start: 2021-12-21 | End: 2021-12-21 | Stop reason: SDUPTHER

## 2021-12-21 RX ORDER — SODIUM CHLORIDE 0.9 % (FLUSH) 0.9 %
5-40 SYRINGE (ML) INJECTION EVERY 12 HOURS SCHEDULED
Status: DISCONTINUED | OUTPATIENT
Start: 2021-12-21 | End: 2021-12-22 | Stop reason: HOSPADM

## 2021-12-21 RX ORDER — DEXAMETHASONE SODIUM PHOSPHATE 10 MG/ML
INJECTION, EMULSION INTRAMUSCULAR; INTRAVENOUS PRN
Status: DISCONTINUED | OUTPATIENT
Start: 2021-12-21 | End: 2021-12-21 | Stop reason: SDUPTHER

## 2021-12-21 RX ORDER — ACETAMINOPHEN 650 MG/1
650 SUPPOSITORY RECTAL EVERY 6 HOURS PRN
Status: DISCONTINUED | OUTPATIENT
Start: 2021-12-21 | End: 2021-12-22 | Stop reason: HOSPADM

## 2021-12-21 RX ORDER — ONDANSETRON 2 MG/ML
4 INJECTION INTRAMUSCULAR; INTRAVENOUS EVERY 6 HOURS PRN
Status: DISCONTINUED | OUTPATIENT
Start: 2021-12-21 | End: 2021-12-22 | Stop reason: HOSPADM

## 2021-12-21 RX ORDER — CEFDINIR 300 MG/1
300 CAPSULE ORAL 2 TIMES DAILY
Qty: 20 CAPSULE | Refills: 0 | Status: SHIPPED | OUTPATIENT
Start: 2021-12-22 | End: 2021-12-31

## 2021-12-21 RX ORDER — PROCHLORPERAZINE EDISYLATE 5 MG/ML
10 INJECTION INTRAMUSCULAR; INTRAVENOUS EVERY 6 HOURS PRN
Status: DISCONTINUED | OUTPATIENT
Start: 2021-12-21 | End: 2021-12-22 | Stop reason: HOSPADM

## 2021-12-21 RX ORDER — FENTANYL CITRATE 50 UG/ML
INJECTION, SOLUTION INTRAMUSCULAR; INTRAVENOUS PRN
Status: DISCONTINUED | OUTPATIENT
Start: 2021-12-21 | End: 2021-12-21 | Stop reason: SDUPTHER

## 2021-12-21 RX ORDER — PAROXETINE 10 MG/1
10 TABLET, FILM COATED ORAL DAILY
Qty: 30 TABLET | Refills: 3 | Status: SHIPPED | OUTPATIENT
Start: 2021-12-21 | End: 2022-01-26

## 2021-12-21 RX ORDER — PROMETHAZINE HYDROCHLORIDE 25 MG/ML
6.25 INJECTION, SOLUTION INTRAMUSCULAR; INTRAVENOUS EVERY 6 HOURS PRN
Status: DISCONTINUED | OUTPATIENT
Start: 2021-12-21 | End: 2021-12-21 | Stop reason: CLARIF

## 2021-12-21 RX ORDER — MIDAZOLAM HYDROCHLORIDE 1 MG/ML
INJECTION INTRAMUSCULAR; INTRAVENOUS PRN
Status: DISCONTINUED | OUTPATIENT
Start: 2021-12-21 | End: 2021-12-21 | Stop reason: SDUPTHER

## 2021-12-21 RX ORDER — SODIUM CHLORIDE 9 MG/ML
INJECTION, SOLUTION INTRAVENOUS CONTINUOUS
Status: DISCONTINUED | OUTPATIENT
Start: 2021-12-21 | End: 2021-12-22 | Stop reason: HOSPADM

## 2021-12-21 RX ORDER — HYDROCODONE BITARTRATE AND ACETAMINOPHEN 5; 325 MG/1; MG/1
2 TABLET ORAL EVERY 4 HOURS PRN
Status: DISCONTINUED | OUTPATIENT
Start: 2021-12-21 | End: 2021-12-21

## 2021-12-21 RX ORDER — HYDROCODONE BITARTRATE AND ACETAMINOPHEN 7.5; 325 MG/1; MG/1
1 TABLET ORAL ONCE
Status: COMPLETED | OUTPATIENT
Start: 2021-12-21 | End: 2021-12-21

## 2021-12-21 RX ORDER — LIDOCAINE HYDROCHLORIDE 20 MG/ML
INJECTION, SOLUTION INTRAVENOUS PRN
Status: DISCONTINUED | OUTPATIENT
Start: 2021-12-21 | End: 2021-12-21 | Stop reason: SDUPTHER

## 2021-12-21 RX ORDER — POLYETHYLENE GLYCOL 3350 17 G/17G
17 POWDER, FOR SOLUTION ORAL DAILY PRN
Status: DISCONTINUED | OUTPATIENT
Start: 2021-12-21 | End: 2021-12-22 | Stop reason: HOSPADM

## 2021-12-21 RX ORDER — ONDANSETRON 2 MG/ML
INJECTION INTRAMUSCULAR; INTRAVENOUS PRN
Status: DISCONTINUED | OUTPATIENT
Start: 2021-12-21 | End: 2021-12-21 | Stop reason: SDUPTHER

## 2021-12-21 RX ORDER — CEFAZOLIN SODIUM 1 G/3ML
INJECTION, POWDER, FOR SOLUTION INTRAMUSCULAR; INTRAVENOUS PRN
Status: DISCONTINUED | OUTPATIENT
Start: 2021-12-21 | End: 2021-12-21 | Stop reason: SDUPTHER

## 2021-12-21 RX ADMIN — FENTANYL CITRATE 25 MCG: 50 INJECTION, SOLUTION INTRAMUSCULAR; INTRAVENOUS at 08:20

## 2021-12-21 RX ADMIN — HYDROCODONE BITARTRATE AND ACETAMINOPHEN 1 TABLET: 5; 325 TABLET ORAL at 19:59

## 2021-12-21 RX ADMIN — POLYETHYLENE GLYCOL 3350 17 G: 17 POWDER, FOR SOLUTION ORAL at 19:59

## 2021-12-21 RX ADMIN — MIDAZOLAM 2 MG: 1 INJECTION INTRAMUSCULAR; INTRAVENOUS at 08:13

## 2021-12-21 RX ADMIN — OXYCODONE AND ACETAMINOPHEN 1 TABLET: 5; 325 TABLET ORAL at 23:58

## 2021-12-21 RX ADMIN — FENTANYL CITRATE 25 MCG: 50 INJECTION, SOLUTION INTRAMUSCULAR; INTRAVENOUS at 08:15

## 2021-12-21 RX ADMIN — PROPOFOL 30 MG: 10 INJECTION, EMULSION INTRAVENOUS at 08:24

## 2021-12-21 RX ADMIN — ONDANSETRON 4 MG: 2 INJECTION INTRAMUSCULAR; INTRAVENOUS at 06:03

## 2021-12-21 RX ADMIN — CEFTRIAXONE SODIUM 1000 MG: 1 INJECTION, POWDER, FOR SOLUTION INTRAMUSCULAR; INTRAVENOUS at 09:38

## 2021-12-21 RX ADMIN — HYDROCODONE BITARTRATE AND ACETAMINOPHEN 1 TABLET: 7.5; 325 TABLET ORAL at 06:04

## 2021-12-21 RX ADMIN — SODIUM CHLORIDE: 9 INJECTION, SOLUTION INTRAVENOUS at 03:30

## 2021-12-21 RX ADMIN — CEFAZOLIN SODIUM 2000 MG: 1 INJECTION, POWDER, FOR SOLUTION INTRAMUSCULAR; INTRAVENOUS at 08:18

## 2021-12-21 RX ADMIN — HYDROCODONE BITARTRATE AND ACETAMINOPHEN 2 TABLET: 5; 325 TABLET ORAL at 14:47

## 2021-12-21 RX ADMIN — PROPOFOL 30 MG: 10 INJECTION, EMULSION INTRAVENOUS at 08:21

## 2021-12-21 RX ADMIN — HYDROCODONE BITARTRATE AND ACETAMINOPHEN 2 TABLET: 5; 325 TABLET ORAL at 10:27

## 2021-12-21 RX ADMIN — SCOPALAMINE 1 PATCH: 1 PATCH, EXTENDED RELEASE TRANSDERMAL at 08:05

## 2021-12-21 RX ADMIN — LIDOCAINE HYDROCHLORIDE 70 MG: 20 INJECTION, SOLUTION INTRAVENOUS at 08:15

## 2021-12-21 RX ADMIN — PROPOFOL 40 MG: 10 INJECTION, EMULSION INTRAVENOUS at 08:18

## 2021-12-21 RX ADMIN — PROPOFOL 30 MG: 10 INJECTION, EMULSION INTRAVENOUS at 08:15

## 2021-12-21 RX ADMIN — DEXAMETHASONE SODIUM PHOSPHATE 10 MG: 10 INJECTION, EMULSION INTRAMUSCULAR; INTRAVENOUS at 08:18

## 2021-12-21 RX ADMIN — CEFTRIAXONE SODIUM 1000 MG: 1 INJECTION, POWDER, FOR SOLUTION INTRAMUSCULAR; INTRAVENOUS at 00:57

## 2021-12-21 RX ADMIN — SODIUM CHLORIDE: 9 INJECTION, SOLUTION INTRAVENOUS at 09:37

## 2021-12-21 RX ADMIN — ONDANSETRON HYDROCHLORIDE 4 MG: 4 INJECTION, SOLUTION INTRAMUSCULAR; INTRAVENOUS at 08:18

## 2021-12-21 ASSESSMENT — PAIN DESCRIPTION - PAIN TYPE
TYPE: ACUTE PAIN

## 2021-12-21 ASSESSMENT — PULMONARY FUNCTION TESTS
PIF_VALUE: 0
PIF_VALUE: 1

## 2021-12-21 ASSESSMENT — PAIN SCALES - GENERAL
PAINLEVEL_OUTOF10: 7
PAINLEVEL_OUTOF10: 8
PAINLEVEL_OUTOF10: 7
PAINLEVEL_OUTOF10: 6
PAINLEVEL_OUTOF10: 7
PAINLEVEL_OUTOF10: 8
PAINLEVEL_OUTOF10: 10
PAINLEVEL_OUTOF10: 4

## 2021-12-21 ASSESSMENT — PAIN DESCRIPTION - ORIENTATION
ORIENTATION: LEFT
ORIENTATION: LEFT

## 2021-12-21 ASSESSMENT — PAIN DESCRIPTION - LOCATION
LOCATION: FLANK

## 2021-12-21 ASSESSMENT — PAIN DESCRIPTION - DESCRIPTORS
DESCRIPTORS: CONSTANT;SHARP;CRAMPING
DESCRIPTORS: SHARP;CONSTANT

## 2021-12-21 NOTE — ED PROVIDER NOTES
EMERGENCY DEPARTMENT ENCOUNTER     CHIEF COMPLAINT   Chief Complaint   Patient presents with    Flank Pain        HPI   [place] Mejia is a 27 y.o. female with prior hx of kidney stone, diabetes, who presents with acute left flank pain, onset was one week ago, but she has had constant recurrent and intermittent left flank pain due to her stones. The duration has been constant since the onset. The patient has associated dysuria and chills. There are no alleviating factors. The context is that the symptoms started spontaneously, without any known precipitants. REVIEW OF SYSTEMS   GI: no nausea, vomiting or diarrhea  Cardiac: No chest pain or syncope  Pulmonary: No difficulty breathing or new cough  General: +chills; No fevers  : See HPI, no hematuria or dysuria  See HPI for further details. All other review of systems  are reviewed and are otherwise negative. PAST MEDICAL & SURGICAL HISTORY   Past Medical History:   Diagnosis Date    Diabetes mellitus ([address]     diet controlled, gestational with last pregnancy in 2020     Kidney stone 2015    thinks left side and Dr. Cardenas Self blasted it per patient    Kidney stone 12/02/2019    on the left side and 2 cms in size.     PONV (postoperative nausea and vomiting)     nausea with kidney stones     Postpartum depression     with previous pregnancies     Past Surgical History:   Procedure Laterality Date    CYSTO/URETERO/PYELOSCOPY, CALCULUS TX Left 12/2/2019    CYSTOCOPY, LEFT STENT placement  performed by Regina Rea MD at [address] [place] [address] Drive Left 3/21/2020    CYSTOSCOPY, LEFT URETERAL STENT EXCHANGE performed by Morris Borjas MD at [address] / [address] / Keyonna Friedman Left 1/27/2020    CYSTOSCOPY WITH LEFT URETERAL STENT EXCHANGE UNDER ULTRASOUND GUIDANCE performed by Regina Rea MD at [address] / [address] / STONE N/A 3/2/2020    CYSTOSCOPY WITH ULTRASOUND GUIDED LEFT URETERAL STENT EXCHANGE performed by Leola Lema MD at [facility] / [address] / STONE Left 3/26/2020    CYSTOSCOPY, LEFT STENT REMOVAL performed by Lynette Rodgers MD at [address] [facility]  12/21/15    miscarriage    OTHER SURGICAL HISTORY  2015    Cystoscopy with right ureteroscopy basket stone removal stent placement (Dr. Jose Valverde, [facility])    TONSILLECTOMY Bilateral     age 5 or 7    TUBAL LIGATION      TYMPANOSTOMY TUBE PLACEMENT          CURRENT MEDICATIONS   Current Outpatient Rx   Medication Sig Dispense Refill    nitrofurantoin, macrocrystal-monohydrate, (MACROBID) 100 MG capsule Take 1 capsule by mouth 2 times daily for 7 days 14 capsule 0    acetaminophen (TYLENOL) 500 MG tablet Take 1,000 mg by mouth every 6 hours as needed for Pain      ibuprofen (ADVIL;MOTRIN) 600 MG tablet Take 1 tablet by mouth every 6 hours as needed for Pain 30 tablet 1        ALLERGIES   No Known Allergies     SOCIAL AND FAMILY HISTORY   Social History     Socioeconomic History    Marital status: Single     Spouse name: None    Number of children: None    Years of education: None    Highest education level: None   Occupational History    None   Tobacco Use    Smoking status: Former Smoker     Start date:      Quit date: 2019     Years since quittin.0    Smokeless tobacco: Never Used    Tobacco comment: Vape   Vaping Use    Vaping Use: Every day    Substances: Always (3 mg)   Substance and Sexual Activity    Alcohol use: Not Currently    Drug use: Not Currently     Frequency: 2.0 times per week     Types: Marijuana [place])    Sexual activity: Not Currently     Partners: Male   Other Topics Concern    None   Social History Narrative    None     Social Determinants of Health     Financial Resource Strain: Low Risk     Difficulty of Paying Living Expenses: Not hard at all   Food Insecurity: No Food Insecurity    Worried About Running Out of Food in the Last Year: Never true    Ran Out of Food in the Last Year: Never true   Transportation Needs:     Lack of Transportation (Medical): Not on file    Lack of Transportation (Non-Medical):  Not on file   Physical Activity:     Days of Exercise per Week: Not on file    Minutes of Exercise per Session: Not on file   Stress:     Feeling of Stress : Not on file   Social Connections:     Frequency of Communication with Friends and Family: Not on file    Frequency of Social Gatherings with Friends and Family: Not on file    Attends Taoist Services: Not on file    Active Member of Clubs or Organizations: Not on file    Attends Club or Organization Meetings: Not on file    Marital Status: Not on file   Intimate Partner Violence:     Fear of Current or Ex-Partner: Not on file    Emotionally Abused: Not on file    Physically Abused: Not on file    Sexually Abused: Not on file   Housing Stability:     Unable to Pay for Housing in the Last Year: Not on file    Number of [place] in the Last Year: Not on file    Unstable Housing in the Last Year: Not on file     Family History   Problem Relation Age of Onset    Heart Disease Father     Heart Disease Paternal Grandfather         PHYSICAL EXAM   VITAL SIGNS: BP 94/68   Pulse 95   Temp 98.9 °F (37.2 °C) (Oral)   Resp 18   Ht 5' 1\" (1.549 m)   Wt 136 lb (61.7 kg)   SpO2 98%   BMI 25.70 kg/m²   Constitutional: Well developed, well nourished, moderate acute distress  Eyes: Sclera nonicteric, conjunctiva moist  HENT: Atraumatic, nose normal  Neck: Supple, no JVD  Respiratory: No retractions, no accessory muscle use, normal breath sounds   Cardiovascular: Normal rate, normal rhythm, no murmurs  GI: Soft, no abdominal tenderness, no guarding, bowel sounds present, no audible bruits or palpable pulsatile masses  Musculoskeletal: No edema, no deformity, moderate left CVA tenderness to palpation   Integument: No rash, dry skin  Neurologic: Alert & oriented, normal speech  Psychiatric: Cooperative, pleasant affect     RADIOLOGY/PROCEDURES   CT ABDOMEN PELVIS WO CONTRAST Additional Contrast? None   Final Result       1. 9 mm stone lodged at the left ureteropelvic junction with severe hydronephrosis proximal to the stone. 2. Nonobstructive nephrolithiasis elsewhere in the bilateral kidneys. **This report has been created using voice recognition software. It may contain minor errors which are inherent in voice recognition technology. **      Final report electronically signed by Dr. Bea Beckham MD on 12/20/2021 11:51 PM          LABS  Labs Reviewed   CBC WITH AUTO DIFFERENTIAL - Abnormal; Notable for the following components:       Result Value    WBC 14.7 (*)     RDW-SD 46.5 (*)     Segs Absolute 11.7 (*)     All other components within normal limits   BASIC METABOLIC PANEL - Abnormal; Notable for the following components:    Glucose 110 (*)     All other components within normal limits   GLOMERULAR FILTRATION RATE, ESTIMATED - Abnormal; Notable for the following components:    Est, Glom Filt Rate 61 (*)     All other components within normal limits   URINE WITH REFLEXED MICRO - Abnormal; Notable for the following components:    Ketones, Urine TRACE (*)     Blood, Urine LARGE (*)     Protein, UA >= 300 (*)     Nitrite, Urine POSITIVE (*)     Leukocyte Esterase, Urine MODERATE (*)     Character, Urine TURBID (*)     All other components within normal limits   CULTURE, REFLEXED, URINE    Narrative:     Source: urine, clean catch       Site: clean void          Current Antibiotics: not stated   ANION GAP   OSMOLALITY   HCG, SERUM, QUALITATIVE   LACTATE, SEPSIS   LACTATE, SEPSIS       ED COURSE & MEDICAL DECISION MAKING   Pertinent Labs & Imaging studies reviewed and interpreted.  (See chart for details)   See EMR for medications prescribed    Vitals:    12/21/21 0055   BP: 94/68   Pulse: 95   Resp: 18   Temp: 98.9 °F (37.2 °C)   SpO2: 98%     POCUS Kidneys and bladder   Date/Time: 12/21/21, 0100 Performed by: Golden Marcial MD   Indications: left flank pain, hydronephrosis    Views obtained:     Right kidney longitudinal and transverse:  Visualized     Left kidney longitudinal and transverse:  Visualized     Bladder longitudinal and transverse:  Visualized     Ureteral jets:  Not Visualized    Findings:     Right kidney longitudinal and transverse:  No hydronephrosis, no visible echogenic stones     Left kidney longitudinal and transverse:  +severe hydronephrosis, no visible echogenic stones     Bladder longitudinal and transverse:  No visible echogenic stones or polyps. Ureteral jets:  Visualized not visualized  Final impression:   Limited renal/bladder ultrasound, negative for echogenic lithiasis, +severe keft sided hydronephrosis and complete unilateral ureteral obstruction. Images and videos saved to PACS System   Differential diagnosis: Renal colic, non-specific back pain, UTI    FINAL IMPRESSION   1. Left flank pain    2. Hydronephrosis due to obstruction of ureter        PLAN  MDM - per bedside US, pt has severe hydronephrosis noted to left kidney. I do worry about sepsis, will need pain control, admission for intervention in AM.    Pt has a large left hydronephrosis, due to a 9 mm kidney stone in the ureteropelvic junction. Pt subsequently was admitted to medicine with urology consultation with plans for stent placement in the AM. Urology recommendation is NPO (ice chips ok) with rocephin.     FINAL DISPOSITION: ADMIT TO FLOOR  (This note was completed with a voice recognition program)        Elena Cowan MD  12/21/21 5705

## 2021-12-21 NOTE — ED NOTES
Pt to ER with complaints of left sided flank pain. She has a known kidney stone for the past two years. Pt states she is supposed to see Dr. Yordan Olivares on 12/29 for a CT scan, unless the pain becomes worse. Pt states the pain today is 10/10.      [address] RN  12/20/21 2018

## 2021-12-21 NOTE — PROGRESS NOTES
Pt back from OR room, pt is very sleepy, answers appropriately and goes back to sleep, bed alarm on v.s stable

## 2021-12-21 NOTE — ED NOTES
Patient updated with plan of care and moved to room. Given ice chips per order. Aware of NPO status. Boyfriend at bedside and pt talking on phone. Resting comfortably. Awaiting inpatient room assignment. Call light in reach. Will continue to monitor.       Camille Zafar RN  12/21/21 4192

## 2021-12-21 NOTE — ANESTHESIA POSTPROCEDURE EVALUATION
Department of Anesthesiology  Postprocedure Note    Patient: Dahlia Sandovla  MRN: [id number]  YOB: 1995  Date of evaluation: 12/21/2021  Time:  10:13 AM     Procedure Summary     Date: 12/21/21 Room / Location: [facility]    Anesthesia Start: 8628 Anesthesia Stop: 3098    Procedure: CYSTOSCOPY LEFT URETERAL STENT INSERTION (Left ) Diagnosis: (LEFT UPJ STONE)    Surgeons: Mike Ennis MD Responsible Provider: Heladio Miranda MD    Anesthesia Type: MAC ASA Status: 2          Anesthesia Type: MAC    Abhi Phase I:      Abhi Phase II:      Last vitals: Reviewed and per EMR flowsheets.        Anesthesia Post Evaluation    Patient location during evaluation: bedside  Patient participation: complete - patient participated  Level of consciousness: awake  Airway patency: patent  Nausea & Vomiting: no vomiting and no nausea  Complications: no  Cardiovascular status: hemodynamically stable  Respiratory status: acceptable  Hydration status: stable

## 2021-12-21 NOTE — CONSULTS
Robyn Page MD  Urology Consultation    Patient:  Isidro Zamudio  MRN: [id number]  YOB: 1995    CHIEF COMPLAINT:  left ureteral stone    HISTORY OF PRESENT ILLNESS:     The patient is a 27 y.o. female who presents with UTI, left ureteral stone. Urology consulted for left ureteral stone    Hx of stones in the past  + UTI  Has been having left flank pain for some time      Patient's old records reviewed. Pertinent patient notes and patient chart reviewed and summarized above. Past Medical History:    Past Medical History:   Diagnosis Date    Diabetes mellitus ([address]     diet controlled, gestational with last pregnancy in 2020     Kidney stone 2015    thinks left side and Dr. Wayne Aid blasted it per patient    Kidney stone 12/02/2019    on the left side and 2 cms in size.     PONV (postoperative nausea and vomiting)     nausea with kidney stones     Postpartum depression     with previous pregnancies       Past Surgical History:    Past Surgical History:   Procedure Laterality Date    CYSTO/URETERO/PYELOSCOPY, CALCULUS TX Left 12/2/2019    CYSTOCOPY, LEFT STENT placement  performed by Waldemar Gotti MD at [address] [place] [address] Drive Left 3/21/2020    CYSTOSCOPY, LEFT URETERAL STENT EXCHANGE performed [facility] Helena Porras MD at [facility] / [address] / STONE Left 1/27/2020    CYSTOSCOPY WITH LEFT URETERAL STENT EXCHANGE UNDER ULTRASOUND GUIDANCE performed by Waledmar Gotti MD at [address] / [address] / STONE N/A 3/2/2020    CYSTOSCOPY WITH ULTRASOUND GUIDED LEFT URETERAL STENT EXCHANGE performed by Waldemar Gotti MD at [address] / [address] / STONE Left 3/26/2020    CYSTOSCOPY, LEFT STENT REMOVAL performed by Gus Mejia MD at [address]  12/21/15    miscarriage    OTHER SURGICAL HISTORY  16 April 2015    Cystoscopy with right ureteroscopy basket stone removal stent [facility]    TONSILLECTOMY Bilateral     age 5 or 7    TUBAL LIGATION      TYMPANOSTOMY TUBE PLACEMENT         Medications:    Scheduled Meds:   sodium chloride flush  5-40 mL IntraVENous 2 times per day    enoxaparin  40 mg SubCUTAneous Daily    cefTRIAXone (ROCEPHIN) IV  1,000 mg IntraVENous Q24H     Continuous Infusions:   sodium chloride      sodium chloride 75 mL/hr at 21 0330     PRN Meds:.sodium chloride flush, sodium chloride, ondansetron **OR** ondansetron, polyethylene glycol, acetaminophen **OR** acetaminophen, prochlorperazine, HYDROcodone 5 mg - acetaminophen **OR** HYDROcodone 5 mg - acetaminophen    Allergies:  Patient has no known allergies. Social History:    Social History     Socioeconomic History    Marital status: Single     Spouse name: Not on file    Number of children: Not on file    Years of education: Not on file    Highest education level: Not on file   Occupational History    Not on file   Tobacco Use    Smoking status: Former Smoker     Start date:      Quit date: 2019     Years since quittin.0    Smokeless tobacco: Never Used    Tobacco comment: Vape   Vaping Use    Vaping Use: Every day    Substances: Always (3 mg)   Substance and Sexual Activity    Alcohol use: Not Currently    Drug use: Not Currently     Frequency: 2.0 times per week     Types: Marijuana [place])    Sexual activity: Not Currently     Partners: Male   Other Topics Concern    Not on file   Social History Narrative    Not on file     Social Determinants of Health     Financial Resource Strain: Low Risk     Difficulty of Paying Living Expenses: Not hard at all   Food Insecurity: No Food Insecurity    Worried About Running Out of Food in the Last Year: Never true    Lorenza of Food in the Last Year: Never true   Transportation Needs:     Lack of Transportation (Medical): Not on file    Lack of Transportation (Non-Medical):  Not on file   Physical Activity:     Days of Exercise per Week: Not on file    Minutes of Exercise per Session: Not on file   Stress:     Feeling of Stress : Not on file   Social Connections:     Frequency of Communication with Friends and Family: Not on file    Frequency of Social Gatherings with Friends and Family: Not on file    Attends Sabianism Services: Not on file    Active Member of [address] [place] [address] [employer] or Organizations: Not on file    Attends Club or Organization Meetings: Not on file    Marital Status: Not on file   Intimate Partner Violence:     Fear of Current or Ex-Partner: Not on file    Emotionally Abused: Not on file    Physically Abused: Not on file    Sexually Abused: Not on file   Housing Stability:     Unable to Pay for Housing in the Last Year: Not on file    Number of [place] in the Last Year: Not on file    Unstable Housing in the Last Year: Not on file       Family History:    Family History   Problem Relation Age of Onset    Heart Disease Father     Heart Disease Paternal Grandfather        REVIEW OF SYSTEMS:  Constitutional: negative  Eyes: negative  Respiratory: negative  Cardiovascular: negative  Gastrointestinal: negative  Genitourinary: see HPI  Musculoskeletal: negative  Skin: negative   Neurological: negative  Hematological/Lymphatic: negative  Psychological: negative        Physical Exam:      This a 27 y.o. female   Patient Vitals for the past 24 hrs:   BP Temp Temp src Pulse Resp SpO2 Height Weight   12/21/21 0315 95/62 98.2 °F (36.8 °C) Oral 85 18 99 %     12/21/21 0055 94/68 98.9 °F (37.2 °C) Oral 95 18 98 %     12/20/21 2318 111/77 98 °F (36.7 °C)  81 18 99 %     12/20/21 2001 109/70 99.1 °F (37.3 °C) Oral 88 18 100 % 5' 1\" (1.549 m) 136 lb (61.7 kg)     Constitutional: Patient in no acute distress. Neuro: Alert and oriented to person, place and time. Psych: mood and affect normal  HEENT negative  Lungs: Respiratory effort is normal  Cardiovascular: Normal peripheral pulses.  Regular rate  Abdomen: Soft, non-tender, non-distended with no CVA, flank pain or hepatosplenomegaly. No hernias. Kidneys normal.  Lymphatics: No palpable lymphadenopathy. Bladder non-tender and not distended. Pelvic exam: deferred  Rectal exam not indicated  Minimal/no edema in bilateral lower extremities. LABS:   Recent Labs     12/20/21 2016   WBC 14.7*   HGB 13.8   HCT 42.4   MCV 91.4        Recent Labs     12/20/21 2016      K 3.8      CO2 23   BUN 11   CREATININE 1.1       Additional Lab/Culture results: none    Urinalysis:   Recent Labs     12/20/21  2210   COLORU YELLOW   PHUR 6.0   WBCUA > 200   RBCUA > 100   YEAST NONE SEEN   BACTERIA MANY   LEUKOCYTESUR MODERATE*   UROBILINOGEN 0.2   BILIRUBINUR NEGATIVE   BLOODU LARGE*        -----------------------------------------------------------------  Imaging Reviewed during this encounter:   Gamaliel Dave MD independently reviewed the images and verified the radiology reports from:    CT ABDOMEN PELVIS WO CONTRAST Additional Contrast? None    Result Date: 12/20/2021  PROCEDURE: CT ABDOMEN PELVIS WO CONTRAST CLINICAL INFORMATION: left flank pain, severe hydronephrosis . COMPARISON: Renal ultrasound dated 3/25/2020 and CT abdomen pelvis dated 8/21/2018. TECHNIQUE: Axial 5 mm CT images were obtained through the abdomen and pelvis. No contrast was given. Coronal and sagittal reconstructions were created. All CT scans at this facility use dose modulation, iterative reconstruction, and/or weight-based dosing when appropriate to reduce radiation dose to as low as reasonably achievable. FINDINGS:  Visualized portions of the lungs are clear. The visualized portion of the unopacified heart is unremarkable. The unopacified liver and gallbladder are unremarkable. Adrenal glands are unremarkable. There is a 3 mm calculus at the superior pole of the right kidney. There is a 9 mm calculus at the left ureteropelvic junction.  There is a large hydronephrosis proximal to the stone. There is a 3 mm calculus at the superior pole of the left kidney. The spleen and pancreas are unremarkable. No retroperitoneal or mesenteric lymphadenopathy is identified. The unopacified large bowel appears within normal limits. The appendix is normal in appearance. Small bowel also appears within normal limits. The unopacified bladder is unremarkable. The uterus and adnexa are unremarkable. No free fluid is identified. Visualized osseous structures appear intact. 1. 9 mm stone lodged at the left ureteropelvic junction with severe hydronephrosis proximal to the stone. 2. Nonobstructive nephrolithiasis elsewhere in the bilateral kidneys. **This report has been created using voice recognition software. It may contain minor errors which are inherent in voice recognition technology. ** Final report electronically signed by Dr. Tico Segovia MD on 12/20/2021 11:51 PM        Assessment and Plan   Impression:    Patient Active Problem List   Diagnosis    Oligohydramnios    Normocytic anemia    Cannabis abuse    Restless legs    Anxiety    Mild major depression ([address]    Low serum vitamin B12    Nephrolithiasis       Plan:     Cysto left stent emergent  Will need treatment of infection prior to stone treatment    Zaira Bowman MD  8:32 AM 12/21/2021

## 2021-12-21 NOTE — OP NOTE
Patient:  Samantha Coto  MRN: [id number]  YOB: 1995    FACILITY: [facility]    DATE: 12/21/2021    SURGEON: Consuelo Vincent MD     ASSISTANT: none    PREOPERATIVE DIAGNOSIS: LEFT UPJ STONE    POSTOPERATIVE DIAGNOSIS: left upj stone    PROCEDURE PERFORMED:   1. Cystourethroscopy  2. Left stent placement    ANESTHESIA: Monitor Anesthesia Care    ESTIMATED BLOOD LOSS: none    COMPLICATIONS: None immediate    DRAINS: 6 x 26 double j stent    SPECIMENS: none    INDICATIONS FOR PROCEDURE:  The patient is a 27 y.o. female who presents today with LEFT UPJ STONE here for CYSTOSCOPY LEFT URETERAL STENT INSERTION. After risks, benefits and alternatives of the procedure were discussed with the patient, the patient elected to proceed. DETAILS OF THE PROCEDURE:  The patient was brought back to the preoperative holding area to the operating suite, and was transferred to the operating table where the patient lay in supine position. General endotracheal anesthesia was induced, and patient was prepped and draped in standard surgical fashion after being placed in dorsolithotomy position. A proper timeout was performed, preoperative antibiotics were given. A rigid cystoscope with a 22 Georgian sheath with 30 degree lens was inserted in the patient's urethral and into the bladder with ease. We then focused our attention on the Left ureteral orifice, which we cannulated with our glidewire. Over our glidewire we placed a 6 x 26 stent and we noted appropriate placement in the upper collecting system using fluoroscopy. We then removed our wire. There was good proximal and distal curl. We did not leave the string at the end of the stent. We then drained the patient's bladder and removed the scope and the procedure was subsequently terminated. I was present and scrubbed for all critical portions of the procedure. Plan:   The patient will be monitored postop.  Willy Browning will be treated as outpatient

## 2021-12-21 NOTE — H&P
Hospitalist - History & Physical      Patient: Charles Bedolla    Unit/Bed:24/024A  YOB: 1995  MRN: [id number]   Acct: [de-identified]   PCP: Myles Murphy MD    Date of Service: Pt seen/examined on 12/21/21  and Admitted to Inpatient with expected LOS greater than two midnights due to medical therapy. Chief Complaint:  Uteropelvic stone w/ left flank pain    Assessment and Plan:      #Acute on Chronic left flank pain 2/2 to Stone: Chronic left flank pain. Pain mgmt given per Urology (Dr. Benjamin Lamar) who pt sees OP for chronic/recurrent Nephrolithiasis. Alternates between Ibuprofen/Tyelnol. Given 1xdose of Dilaudid in ED, seemed to have resolved pt's pain for the night. - Tylenol PRN for mild-Mod pain   - Can add Norco for pain if needed until Urological procedure    #Hydronephrosis, severe 2/2 Uteroplevic Stone, 9mm, Left: Admitted for acute left flank pain w/known chronic kidney stone. Appears it has migrated into the UPJ and subsequently leading to hydroureteronephrosis. Suspect this is the source to pt's flank pain as the stone moves and the kidney stretches. CT A/P demonstrates the stone location. Pt will likely need a percutaneous nephrostomy tube for fluid/pressure removal along with stent placement. Pt has had prior discussions about and understands what is involved. - Urology consulted, plan for stent placement in AM  - C/w NPO status for procedure  - Avoid NSAIDs at this time given ? YVETTE - will c/w plan as above  - Monitor for s/sxs of Urinary retention    #UTI: Hx of UTI w/Rx in past. UA demonstrates Nitrite/LE (+) and many bacteria. Pt c/o dysuria. Given 1g Rocephin in ED  - Can adjust ABx regimen once cultures return and stent placed    History Of Present Illness:      Charles Bedolla is a 27 y.o. female w/significant PMHx of GDM and Hx of Nephrolithiasis s/p Stent placement 03/2020 who presents to the [facility] ED c/o acute left-sided flank pain.  Pt states onset of acute pain started approximately 1 week ago, but pt has had recurrent/intermittent left flank pain 2/2 to renal stones. Pt states the pain has been constant since onset and is a 10/10, sharp on dull, a/w dysuria, nausea, and chills. Pt was planned to have CT scan and see Dr. Loy Zepeda on 12/29, however the pain was too much to wait. Denies alleviating factors. Pt denies CP, SOB, fever, V/D, hematuria. ED course: VSS and Afebrile. eGFR 61, Cr 1.1 (baseline 2020 0.4-0.6, 2021 0.8-1.25), lactic acid 0.8, WBC 14.7 w/Abs Segs 11.7, UA demonstrates trace ketones, large blood, RBC >100, >300 protein, Nitrites positive w/many Bacteria seen, Moderate leukocyte esterase w/>200 WBCs. CT A/P demonstrates 9mm stone lodged at the left UPJ w/Severe hydronephrosis proximal to the stone and non-obstrictive stones in b/l kidneys. Pt given Zofran, Dilaudid, and Rocephin. Urology consulted and will intervene in the AM. Admitted for further mgmt and evaluation prior to urological procedure. Past Medical History:  has a past medical history of Diabetes mellitus ([address] Kidney stone, Kidney stone, PONV (postoperative nausea and vomiting), and Postpartum depression. Past Surgical History:  has a past surgical history that includes Tympanostomy tube placement; other surgical history (16 April 2015); Dilation and curettage of uterus (12/21/15); cysto/uretero/pyeloscopy, calculus tx (Left, 12/2/2019); CYSTOSCOPY INSERTION / REMOVAL STENT / STONE (Left, 1/27/2020); CYSTOSCOPY INSERTION / REMOVAL STENT / STONE (N/A, 3/2/2020); Cystoscopy (Left, 3/21/2020); CYSTOSCOPY INSERTION / REMOVAL STENT / STONE (Left, 3/26/2020); Tonsillectomy (Bilateral); and Tubal ligation. Home Medications:   No current facility-administered medications on file prior to encounter.      Current Outpatient Medications on File Prior to Encounter   Medication Sig Dispense Refill    nitrofurantoin, macrocrystal-monohydrate, (MACROBID) 100 MG capsule Take 1 capsule by mouth 2 times daily for 7 days 14 capsule 0    acetaminophen (TYLENOL) 500 MG tablet Take 1,000 mg by mouth every 6 hours as needed for Pain      ibuprofen (ADVIL;MOTRIN) 600 MG tablet Take 1 tablet by mouth every 6 hours as needed for Pain 30 tablet 1       Allergies:    Patient has no known allergies. Social History:  reports that she quit smoking about 2 years ago. She started smoking about 5 years ago. She has never used smokeless tobacco. She reports previous alcohol use. She reports previous drug use. Frequency: 2.00 times per week. Drug: Marijuana Darryle Pimple). Family History: family history includes Heart Disease in her father and paternal grandfather. Diet:  Diet NPO Exceptions are: Ice Chips    Review of systems:   Pertinent positives as noted in the HPI. All other systems reviewed and negative. PHYSICAL EXAM:   height is 5' 1\" (1.549 m) and weight is 136 lb (61.7 kg). Her oral temperature is 98.9 °F (37.2 °C). Her blood pressure is 94/68 and her pulse is 95. Her respiration is 18 and oxygen saturation is 98%. Body mass index is 25.7 kg/m². Constitutional: In no acute distress. Patient is oriented to person, place, and time. Head: Normocephalic and atraumatic. Mouth/Throat: Oropharynx is clear and moist.  No oral thrush. Eyes: Conjunctivae are normal. Pupils are equal, round, and reactive to light. No scleral icterus. Neck: Neck supple. No JVD present. No tracheal deviation present. Cardiovascular: Normal rate, regular rhythm, normal heart sounds. No murmur heard. Pulmonary/Chest: Effort normal and breath sounds normal. No stridor. No respiratory distress. No wheezes. No rales. Patient exhibits no tenderness. Abdominal: Soft. Patient exhibits no distension. Bowel sounds present. Pain w/movement, deferred CVA exam for obvious visual discomfort and confirmation of stone on imaging  Musculoskeletal: Normal range of motion.   Extremities: Patient exhibits no edema and no tenderness. Lymphadenopathy: No cervical adenopathy. Neurological: Patient is alert and oriented to person, place, and time. Skin: Skin is warm and dry. Patient is not diaphoretic. No rashes or lesions. Psychiatric: Patient has a normal mood and affect. Patient behavior is normal.       Electronically signed by Ana Little M.D. on 12/21/2021 at 1:24 AM       ATTESTATION: I personally evaluated and examined this patient, and discussed my findings with Dr. Ana Little. I agree with his assessment and plans as detailed above. Additionally, patient denies overuse of vitamin C, but is a self-described \"meat and potatoes\" person. She admits that she does not have much dairy intake, and likely has low calcium intake in her diet. Patient has recurrent kidney stones, and I explained to the patient a eating too many foods high in protein and a diet low in calcium may actually increase chances of developing kidney stones. We may consider dietary consultation for further education.

## 2021-12-21 NOTE — ED NOTES
Pt states feeling better, awaiting ct scan. Pt denies further needs at this time. Will continue to monitor.      Jennifer Patel, RN  12/20/21 [id number]

## 2021-12-21 NOTE — ANESTHESIA PRE PROCEDURE
chloride infusion   IntraVENous Continuous Alta Robledo MD 75 mL/hr at 12/21/21 0330 New Bag at 12/21/21 0330    prochlorperazine (COMPAZINE) injection 10 mg  10 mg IntraVENous Q6H PRN Marty Moreno MD        HYDROcodone-acetaminophen (NORCO) 5-325 MG per tablet 1 tablet  1 tablet Oral Q4H PRN Shirley Barbone, APRN - CNP        Or    HYDROcodone-acetaminophen (NORCO) 5-325 MG per tablet 2 tablet  2 tablet Oral Q4H PRN Shirley Barbone, APRN - CNP           Allergies:  No Known Allergies    Problem List:    Patient Active Problem List   Diagnosis Code    Oligohydramnios O41.00X0    Normocytic anemia D64.9    Cannabis abuse F12.10    Restless legs G25.81    Anxiety F41.9    Mild major depression ([address] F32.0    Low serum vitamin B12 E53.8    Nephrolithiasis N20.0       Past Medical History:        Diagnosis Date    Diabetes mellitus ([address]     diet controlled, gestational with last pregnancy in 2020     Kidney stone 2015    thinks left side and Dr. Bill Epps blasted it per patient    Kidney stone 12/02/2019    on the left side and 2 cms in size.     PONV (postoperative nausea and vomiting)     nausea with kidney stones     Postpartum depression     with previous pregnancies       Past Surgical History:        Procedure Laterality Date    CYSTO/URETERO/PYELOSCOPY, CALCULUS TX Left 12/2/2019    CYSTOCOPY, LEFT STENT placement  performed by Manjeet Marcial MD at [facility] Left 3/21/2020    CYSTOSCOPY, LEFT URETERAL STENT EXCHANGE performed by Mukul Moy MD at [address] / [place] / Norrine Seton Left 1/27/2020    CYSTOSCOPY WITH LEFT URETERAL STENT EXCHANGE UNDER ULTRASOUND GUIDANCE performed by Manjeet Marcial MD at [address] / [place] / STONE N/A 3/2/2020    CYSTOSCOPY WITH ULTRASOUND GUIDED LEFT URETERAL STENT EXCHANGE performed by Manjeet Marcial MD at [address] / [place] / STONE Left 3/26/2020    CYSTOSCOPY, LEFT STENT REMOVAL performed by Alena Eli MD at [address]  12/21/15    miscarriage    OTHER SURGICAL HISTORY  2015    Cystoscopy with right ureteroscopy basket stone removal stent placement (Dr. Whitney Navarro, [facility])   [address] Bilateral     age 5 or 13    TUBAL LIGATION      TYMPANOSTOMY TUBE PLACEMENT         Social History:    Social History     Tobacco Use    Smoking status: Former Smoker     Start date:      Quit date: 2019     Years since quittin.0    Smokeless tobacco: Never Used    Tobacco comment: Vape   Substance Use Topics    Alcohol use: Not Currently                                Counseling given: Not Answered  Comment: Vape      Vital Signs (Current):   Vitals:    21 2001 21 2318 21 0055 21 0315   BP: 109/70 111/77 94/68 95/62   Pulse: 88 81 95 85   Resp: 18 18 18 18   Temp: 99.1 °F (37.3 °C) 98 °F (36.7 °C) 98.9 °F (37.2 °C) 98.2 °F (36.8 °C)   TempSrc: Oral  Oral Oral   SpO2: 100% 99% 98% 99%   Weight: 136 lb (61.7 kg)      Height: 5' 1\" (1.549 m)                                                 BP Readings from Last 3 Encounters:   21 95/62   21 108/70   21 122/83       NPO Status:                                                                                 BMI:   Wt Readings from Last 3 Encounters:   21 136 lb (61.7 kg)   21 152 lb 12.8 oz (69.3 kg)   21 154 lb (69.9 kg)     Body mass index is 25.7 kg/m².     CBC:   Lab Results   Component Value Date    WBC 14.7 2021    RBC 4.64 2021    RBC 4.45 2021    HGB 13.8 2021    HCT 42.4 2021    MCV 91.4 2021    RDW 15.1 2021     2021       CMP:   Lab Results   Component Value Date     2021    K 3.8 2021    K 3.3 2020     2021    CO2 23 2021    BUN 11 2021    CREATININE 1.1 2021    LABGLOM 61 12/20/2021    GLUCOSE 110 12/20/2021    GLUCOSE 76 08/02/2021    PROT 7.3 08/02/2021    CALCIUM 9.0 12/20/2021    BILITOT 0.4 08/02/2021    ALKPHOS 52 08/02/2021    ALKPHOS 77 03/16/2021    AST 16 08/02/2021    ALT 15 08/02/2021       POC Tests: No results for input(s): POCGLU, POCNA, POCK, POCCL, POCBUN, POCHEMO, POCHCT in the last 72 hours. Coags:   Lab Results   Component Value Date    INR 1.09 12/02/2019       HCG (If Applicable):   Lab Results   Component Value Date    PREGTESTUR Negative 12/13/2021    PREGSERUM NEGATIVE 12/20/2021        ABGs: No results found for: PHART, PO2ART, XNG5UCN, QSN5LVB, BEART, U4KHONLQ     Type & Screen (If Applicable):  Lab Results   Component Value Date    LABABO A 12/04/2020    [address] 03/24/2021       Drug/Infectious Status (If Applicable):  No results found for: HIV, HEPCAB    COVID-19 Screening (If Applicable):   Lab Results   Component Value Date    COVID19 NOT DETECTED 05/08/2020           Anesthesia Evaluation     history of anesthetic complications: PONV. Airway: Mallampati: II  TM distance: >3 FB   Neck ROM: full  Mouth opening: > = 3 FB Dental:          Pulmonary: breath sounds clear to auscultation                             Cardiovascular:            Rhythm: regular                      Neuro/Psych:   (+) psychiatric history:            GI/Hepatic/Renal:             Endo/Other:    (+) Diabetes, . Abdominal:             Vascular: Other Findings:             Anesthesia Plan      MAC     ASA 2       Induction: intravenous. MIPS: Postoperative opioids intended and Prophylactic antiemetics administered. Anesthetic plan and risks discussed with patient. Plan discussed with CRNA.                   Jayesh Muro MD   12/21/2021

## 2021-12-21 NOTE — CARE COORDINATION
12/21/21, 1:31 PM EST  DISCHARGE PLANNING EVALUATION:    Sue Mensah       Admitted: 12/20/2021/ [facility] 2050 day: 0   Location: [address] [place] [address]-A Reason for admit: Nephrolithiasis [N20.0]  Left flank pain [R10.9]  Hydronephrosis due to obstruction of ureter [N13.1]   PMH:  has a past medical history of Diabetes mellitus ([address] Kidney stone, Kidney stone, PONV (postoperative nausea and vomiting), and Postpartum depression. Procedure: 12/21/21 surgery by Dr Belle Lawler:   1. Cystourethroscopy  2. Left stent placement  Barriers to Discharge:  Pain management. I&O. Regular diet. Up with assistance. PCP: Misty Crespo MD  Readmission Risk Score: 7.8 ( )%    Patient Goals/Plan/Treatment Preferences: I spoke with Vania Tabor. Planning home with her children - 8 months and 18 months. She wants to go home this evening to be at court with children's father tomorrow morning. No discharge needs voiced. Transportation/Food Security/Housekeeping Addressed:  No issues identified. 12/21/21, 1:34 PM EST    Patient goals/plan/ treatment preferences discussed by  and . Patient goals/plan/ treatment preferences reviewed with patient/ family. Patient/ family verbalize understanding of discharge plan and are in agreement with goal/plan/treatment preferences. Understanding was demonstrated using the teach back method. AVS provided by RN at time of discharge, which includes all necessary medical information pertaining to the patients current course of illness, treatment, post-discharge goals of care, and treatment preferences.

## 2021-12-21 NOTE — CONSULTS
[facility]  [address] [facility] [address]  Dept: [phone]  Loc: [phone]  Visit Date: 12/20/2021    Urology Consult Note    Reason for Consult:  Left UPJ obstructing stone  Requesting Physician:  Marnie Lepe    History Obtained From:  patient    Chief Complaint: left flank pain    HISTORY OF PRESENT ILLNESS:                The patient is a 27 y.o. female with significant past medical history of GDM and Hx of Nephrolithiasis s/p Stent placement 03/2020 who presents to the [facility] ED c/o acute left-sided flank pain. Pt states onset of acute pain started approximately 1 week ago, but pt has had recurrent/intermittent left flank pain 2/2 to renal stones. Pt states the pain has been constant since onset and is a 10/10, sharp on dull, a/w dysuria, nausea, and chills. Pt was planned to have CT scan and see Dr. Almas Gonzalez on 12/29, however the pain was too much to wait. Denies alleviating factors. Pt denies CP, SOB, fever, V/D, hematuria. Urology was consulted for left UPJ obstructing stone. CT shows   Impression       1. 9 mm stone lodged at the left ureteropelvic junction with severe hydronephrosis proximal to the stone. 2. Nonobstructive nephrolithiasis elsewhere in the bilateral kidneys.         Ua +nitrite, large blood, mod leuk, wbc >200      Past Medical History:        Diagnosis Date    Diabetes mellitus ([address]     diet controlled, gestational with last pregnancy in 2020     Kidney stone 2015    thinks left side and Dr. Noel Escalante blasted it per patient    Kidney stone 12/02/2019    on the left side and 2 cms in size.     PONV (postoperative nausea and vomiting)     nausea with kidney stones     Postpartum depression     with previous pregnancies     Past Surgical History:        Procedure Laterality Date    CYSTO/URETERO/PYELOSCOPY, CALCULUS TX Left 12/2/2019    CYSTOCOPY, LEFT STENT placement  performed by Byron William MD at [facility] CYSTOSCOPY Left 3/21/2020    CYSTOSCOPY, LEFT URETERAL STENT EXCHANGE performed by Divya Vasquez MD at [address] / [address] / Rashaad Martinss Left 2020    CYSTOSCOPY WITH LEFT URETERAL STENT EXCHANGE UNDER ULTRASOUND GUIDANCE performed by Redd Arango MD at [address] / [address] / STONE N/A 3/2/2020    CYSTOSCOPY WITH ULTRASOUND GUIDED LEFT URETERAL STENT EXCHANGE performed by Redd Arango MD at [address] / REMOVAL STENT / STONE Left 3/26/2020    CYSTOSCOPY, LEFT STENT REMOVAL performed by Pancho Max MD at [address]  12/21/15    miscarriage    OTHER SURGICAL HISTORY  2015    Cystoscopy with right ureteroscopy basket stone removal stent placement (Dr. Beverly [facility], [facility])    TONSILLECTOMY Bilateral     age 5 or 13    TUBAL LIGATION      TYMPANOSTOMY TUBE PLACEMENT         Social History     Socioeconomic History    Marital status: Single     Spouse name: Not on file    Number of children: Not on file    Years of education: Not on file    Highest education level: Not on file   Occupational History    Not on file   Tobacco Use    Smoking status: Former Smoker     Start date:      Quit date: 2019     Years since quittin.0    Smokeless tobacco: Never Used    Tobacco comment: Vape   Vaping Use    Vaping Use: Every day    Substances: Always (3 mg)   Substance and Sexual Activity    Alcohol use: Not Currently    Drug use: Not Currently     Frequency: 2.0 times per week     Types: Marijuana Joel Blow)    Sexual activity: Not Currently     Partners: Male   Other Topics Concern    Not on file   Social History Narrative    Not on file     Social Determinants of Health     Financial Resource Strain: Low Risk     Difficulty of Paying Living Expenses: Not hard at all   Food Insecurity: No Food Insecurity    Worried About [address] [employer] in the Last Year: Never true   Janelle Abi Ran Out of Food in the Last Year: Never true   Transportation Needs:     Lack of Transportation (Medical): Not on file    Lack of Transportation (Non-Medical): Not on file   Physical Activity:     Days of Exercise per Week: Not on file    Minutes of Exercise per Session: Not on file   Stress:     Feeling of Stress : Not on file   Social Connections:     Frequency of Communication with Friends and Family: Not on file    Frequency of Social Gatherings with Friends and Family: Not on file    Attends Taoism Services: Not on file    Active Member of [address] [place] [address] [employer] or Organizations: Not on file    Attends Club or Organization Meetings: Not on file    Marital Status: Not on file   Intimate Partner Violence:     Fear of Current or Ex-Partner: Not on file    Emotionally Abused: Not on file    Physically Abused: Not on file    Sexually Abused: Not on file   Housing Stability:     Unable to Pay for Housing in the Last Year: Not on file    Number of [place] in the Last Year: Not on file    Unstable Housing in the Last Year: Not on file       AL  Family History   Problem Relation Age of Onset    Heart Disease Father     Heart Disease Paternal Grandfather        Allergies:  No Known Allergies    ROS:  Constitutional: Negative for chills, fatigue, fever, or weight loss. Eyes: Denies reported visual changes. ENT: Denies headache, difficulty swallowing, nose bleeds, ringing in ears, or earaches. Cardiovascular: Negative for chest pain, palpitations, tachycardia or edema. Respiratory: Denies cough or SOB. GI:++left flank pain  : See HPI  Musculoskeletal: Patient denies low back pain or painful or reduced ROM of the back or extremities. Neurological: The patient denies any symptoms of neurological impairment or TIA's; no history of stroke. Lymphatic: Denies swollen glands in neck, axillary or inguinal areas. Psychiatric: Denies anxiety or depression. Skin: Denies rash or lesions.   The remainder of the complete ROS is negative    PHYSICAL EXAM:  VITALS:  BP 95/62   Pulse 85   Temp 98.2 °F (36.8 °C) (Oral)   Resp 18   Ht 5' 1\" (1.549 m)   Wt 136 lb (61.7 kg)   SpO2 99%   BMI 25.70 kg/m² . Nursing note and vitals reviewed. Constitutional:    Alert and oriented times 3, no acute distress and cooperative to examination with appropriate mood and affect. HEENT:   Head:         Normocephalic and atraumatic. Mouth/Throat:          Mucous membranes are normal.   Eyes:         EOM are normal. No scleral icterus. Nose:    The external appearance of the nose is normal  Ears: The ears appear normal to external inspection   Neck:         Supple, symmetrical, trachea midline, no adenopathy, thyroid symmetric, not enlarged and no tenderness. Cardiovascular:        Normal rate, regular rhythm, S1 S2 heart sounds. Pulmonary/Chest:       Chest symmetric with normal A/P diameter, no wheezes, rales, or rhonchi noted. Normal respiratory rate and rhthym. No use of accessory muscles. Abdominal:          Soft. No tenderness. Active bowel sounds. Genitalia: Genitalia shows no irritation or eExternal rythema   Urethral Meatus appears to be normal in size and location   Urethra is normal with no tenderness or masses  Musculoskeletal:    Normal range of motion. She exhibits no edema or tenderness of lower extremities. Extremities:    No cyanosis, clubbing, or edema present. Neurological:    Alert and oriented. No cranial nerve deficit. There are no focalizing motor or sensory deficits.     DATA:  CBC:   Lab Results   Component Value Date    WBC 14.7 12/20/2021    RBC 4.64 12/20/2021    RBC 4.45 08/02/2021    HGB 13.8 12/20/2021    HCT 42.4 12/20/2021    MCV 91.4 12/20/2021    MCH 29.7 12/20/2021    MCHC 32.5 12/20/2021    RDW 15.1 08/02/2021     12/20/2021    MPV 9.9 12/20/2021     BMP:    Lab Results   Component Value Date     12/20/2021    K 3.8 12/20/2021    K 3.3 03/26/2020

## 2021-12-21 NOTE — DISCHARGE SUMMARY
Hospital Medicine Discharge Summary      Patient Identification:   Reggie Monte   : 1995  MRN: [id number]   Account: [de-identified]      Patient's PCP: Katherin Hudson MD    Admit Date: 2021     Discharge Date:   2021    Admitting Physician: Pam Aquino MD     Discharge Physician: Ani Dailey, DO     Discharge Diagnoses:    1. Urethral stone, left sided - per imaging 9 mm in stone obstructing left UV junction. Urology consulted.  underwent emergent cystoscopy with left ureteral stent placement. Will undergo outpatient ureteral stone treatment. Follow up outpatient urology in one week.      2. Hydronephrosis, left sided - see above     3. UTI, complicated - likely secondary to urethral stone obstruction. UA positive for UTI. Started on Rocephin for empiric antibiotic coverage. Will switch to cefdinir 300 mg p.o. twice daily x9. Follow-up outpatient with urology for adjustments.     4. DM2 - per chart review. No reported home antidiabetics.      5. History of Nephrolithiasis - s/p multiple cystoscopies for left-sided stent placement/removal of stone 2020. Follows outpatient with Dr. Bailee Garcia. The patient was seen and examined on day of discharge and this discharge summary is in conjunction with any daily progress note from day of discharge. Hospital Course: The patient is a 27year old female with a past medical history of of DM2, nephrolithiasis s/p cystoscopy for left-sided stent placement 2020 who presented to [facility] ED  for left-sided flank pain. The patient states her pain started 1 week ago and has been constant, described as a sharp to dull pain and rated as 10/10. She reports this was associated with dysuria, nausea, vomiting, fatigue and chills. She follows outpatient with Dr. Bailee Garcia urology and was scheduled for CT scan .  However, her pain continued to worsen prompting her to come to the ED.     Of note, the patient states she was found to have a urethral stone in 2018 and was following outpatient urology to have it removed. This was delayed due her pregnancy. She eventually got a urethral stent placed in 2020 with a plan for stone removal which was again delayed due to a second pregnancy.     In the ED mildly hypotensive at 95/68 but afebrile. Labs reveal WBC 14.7 otherwise WNL. UA ketones trace, protein >300, nitrite positive, leukocyte esterase moderate, WBC >200, RBC >100 and many bacteria. Urine culture pending. CT abdomen revealed 9 mm stone lodged in left UV junction with severe hydronephrosis proximal to the stone  and nonobstructive nephrolithiasis in bilateral kidneys. She was started on Rocephin for empiric antibiotic coverage. Urology was consulted. The patient was admitted for further evaluation management.     The patient underwent emergent cystoscopy with left urethral stent placement this morning. Per urology the patient will require treatment for UTI followed by outpatient treatment for ureteral stone. Patient reports postprocedure she is doing well. She denies dysuria or hematuria. Hemodynamically stable, vitals WNL. She will need to follow-up outpatient with urology in 1 week. The patient is medically stable for discharge. Exam:     Vitals:  Vitals:    12/21/21 1943 12/21/21 2215 12/22/21 0330 12/22/21 0845   BP: 109/71 100/62 104/64 105/60   Pulse: 86 54 53 62   Resp: 16 16 16 16   Temp: 97.9 °F (36.6 °C)   98.3 °F (36.8 °C)   TempSrc: Oral   Oral   SpO2: 99% 98% 97% 100%   Weight:       Height:         Weight: Weight: 136 lb (61.7 kg)     24 hour intake/output:    Intake/Output Summary (Last 24 hours) at 12/22/2021 8101  Last data filed at 12/21/2021 2323  Gross per 24 hour   Intake 1619.97 ml   Output    Net 1619.97 ml         General appearance:  No apparent distress, appears stated age and cooperative. HEENT:  Normal cephalic, atraumatic without obvious deformity. Pupils equal, round, and reactive to light.   Extra ocular muscles intact. Conjunctivae/corneas clear. Neck: Supple, with full range of motion. No jugular venous distention. Trachea midline. Respiratory:  Normal respiratory effort. Clear to auscultation, bilaterally without Rales/Wheezes/Rhonchi. Cardiovascular:  Regular rate and rhythm with normal S1/S2 without murmurs, rubs or gallops. Abdomen: Soft, non-tender, non-distended with normal bowel sounds. Musculoskeletal:  No clubbing, cyanosis or edema bilaterally. Full range of motion without deformity. Skin: Skin color, texture, turgor normal.  No rashes or lesions. Neurologic:  Neurovascularly intact without any focal sensory/motor deficits. Cranial nerves: II-XII intact, grossly non-focal.  Psychiatric:  Alert and oriented, thought content appropriate, normal insight  Capillary Refill: Brisk,< 3 seconds   Peripheral Pulses: +2 palpable, equal bilaterally       Labs: For convenience and continuity at follow-up the following most recent labs are provided:      CBC:    Lab Results   Component Value Date    WBC 9.7 12/22/2021    HGB 12.2 12/22/2021    HCT 37.9 12/22/2021     12/22/2021       Renal:    Lab Results   Component Value Date     12/22/2021    K 4.7 12/22/2021     12/22/2021    CO2 18 12/22/2021    BUN 11 12/22/2021    CREATININE 0.9 12/22/2021    CALCIUM 8.6 12/22/2021         Significant Diagnostic Studies    Radiology:   CT ABDOMEN PELVIS WO CONTRAST Additional Contrast? None   Final Result       1. 9 mm stone lodged at the left ureteropelvic junction with severe hydronephrosis proximal to the stone. 2. Nonobstructive nephrolithiasis elsewhere in the bilateral kidneys. **This report has been created using voice recognition software. It may contain minor errors which are inherent in voice recognition technology. **      Final report electronically signed by Dr. Robbie Rees MD on 12/20/2021 11:51 PM             Consults:     IP CONSULT TO UROLOGY    Disposition: Home  Condition at Discharge: Stable    Code Status:  Full Code     Patient Instructions: Activity: activity as tolerated  Diet: ADULT DIET; Regular      Follow-up visits:   Karan Kawasaki, MD  [address] [place] [address] [phone]    In 1 week  Post hospitalization and urethral stent placement         Discharge Medications:        Medication List      START taking these medications    cefdinir 300 MG capsule  Commonly known as: OMNICEF  Take 1 capsule by mouth 2 times daily for 9 days     oxybutynin 10 MG extended release tablet  Commonly known as: Ditropan XL  Take 1 tablet by mouth daily     oxyCODONE-acetaminophen 5-325 MG per tablet  Commonly known as: PERCOCET  Take 1 tablet by mouth every 6 hours as needed for Pain for up to 3 days.      tamsulosin 0.4 MG capsule  Commonly known as: FLOMAX  Take 1 capsule by mouth daily        CONTINUE taking these medications    acetaminophen 500 MG tablet  Commonly known as: TYLENOL     ibuprofen 600 MG tablet  Commonly known as: ADVIL;MOTRIN  Take 1 tablet by mouth every 6 hours as needed for Pain     PARoxetine 10 MG tablet  Commonly known as: Paxil  Take 1 tablet by mouth daily        STOP taking these medications    nitrofurantoin (macrocrystal-monohydrate) 100 MG capsule  Commonly known as: MACROBID           Where to Get Your Medications      These medications were sent to [address] [place] [address] , [address]  [address] [place], [place] [address]    Phone: [phone]   · cefdinir 300 MG capsule  · oxybutynin 10 MG extended release tablet  · PARoxetine 10 MG tablet  · tamsulosin 0.4 MG capsule     You can get these medications from any pharmacy    Bring a paper prescription for each of these medications  · oxyCODONE-acetaminophen 5-325 MG per tablet          Time Spent on discharge is more than 30 minutes in the examination, evaluation, counseling and review

## 2021-12-21 NOTE — PLAN OF CARE
ureteral stone treatment. Continue with conservative management. 2. Hydronephrosis, left sided - see above    3. UTI, complicated - likely secondary to urethral stone obstruction. UA positive for UTI. Started on Rocephin for empiric antibiotic coverage. Will continue for a total of 10 days. De-escalate per cultures. 4. DM2 - per chart review. No reported home antidiabetics. Will monitor blood glucose with daily BMP. Started insulin low-dose sliding scale and Accu-Cheks. Adjust insulin accordingly. 5. History of Nephrolithiasis - s/p multiple cystoscopies for left-sided stent placement/removal of stone 03/2020. Follows outpatient with Dr. Sigifredo Wharton.

## 2021-12-21 NOTE — ED NOTES
Patient transported to [phone]  by cart in stable condition. IV line is patent.         Daniela Crespo, EMT-P  12/21/21 4991

## 2021-12-22 ENCOUNTER — TELEPHONE (OUTPATIENT)
Dept: UROLOGY | Age: 26
End: 2021-12-22

## 2021-12-22 VITALS
WEIGHT: 136 LBS | HEART RATE: 62 BPM | SYSTOLIC BLOOD PRESSURE: 105 MMHG | HEIGHT: 61 IN | DIASTOLIC BLOOD PRESSURE: 60 MMHG | RESPIRATION RATE: 16 BRPM | BODY MASS INDEX: 25.68 KG/M2 | TEMPERATURE: 98.3 F | OXYGEN SATURATION: 100 %

## 2021-12-22 LAB
ANION GAP SERPL CALCULATED.3IONS-SCNC: 13 MEQ/L (ref 8–16)
BUN BLDV-MCNC: 11 MG/DL (ref 7–22)
CALCIUM SERPL-MCNC: 8.6 MG/DL (ref 8.5–10.5)
CHLORIDE BLD-SCNC: 108 MEQ/L (ref 98–111)
CO2: 18 MEQ/L (ref 23–33)
CREAT SERPL-MCNC: 0.9 MG/DL (ref 0.4–1.2)
ERYTHROCYTE [DISTWIDTH] IN BLOOD BY AUTOMATED COUNT: 14 % (ref 11.5–14.5)
ERYTHROCYTE [DISTWIDTH] IN BLOOD BY AUTOMATED COUNT: 47.7 FL (ref 35–45)
GFR SERPL CREATININE-BSD FRML MDRD: 77 ML/MIN/1.73M2
GLUCOSE BLD-MCNC: 87 MG/DL (ref 70–108)
HCT VFR BLD CALC: 37.9 % (ref 37–47)
HEMOGLOBIN: 12.2 GM/DL (ref 12–16)
MCH RBC QN AUTO: 29.6 PG (ref 26–33)
MCHC RBC AUTO-ENTMCNC: 32.2 GM/DL (ref 32.2–35.5)
MCV RBC AUTO: 92 FL (ref 81–99)
ORGANISM: ABNORMAL
PLATELET # BLD: 216 THOU/MM3 (ref 130–400)
PMV BLD AUTO: 10.9 FL (ref 9.4–12.4)
POTASSIUM REFLEX MAGNESIUM: 4.7 MEQ/L (ref 3.5–5.2)
RBC # BLD: 4.12 MILL/MM3 (ref 4.2–5.4)
SODIUM BLD-SCNC: 139 MEQ/L (ref 135–145)
URINE CULTURE REFLEX: ABNORMAL
WBC # BLD: 9.7 THOU/MM3 (ref 4.8–10.8)

## 2021-12-22 PROCEDURE — 6370000000 HC RX 637 (ALT 250 FOR IP): Performed by: UROLOGY

## 2021-12-22 PROCEDURE — 85027 COMPLETE CBC AUTOMATED: CPT

## 2021-12-22 PROCEDURE — 99233 SBSQ HOSP IP/OBS HIGH 50: CPT | Performed by: UROLOGY

## 2021-12-22 PROCEDURE — 36415 COLL VENOUS BLD VENIPUNCTURE: CPT

## 2021-12-22 PROCEDURE — 6360000002 HC RX W HCPCS: Performed by: UROLOGY

## 2021-12-22 PROCEDURE — 80048 BASIC METABOLIC PNL TOTAL CA: CPT

## 2021-12-22 PROCEDURE — 99239 HOSP IP/OBS DSCHRG MGMT >30: CPT | Performed by: FAMILY MEDICINE

## 2021-12-22 PROCEDURE — 2580000003 HC RX 258: Performed by: UROLOGY

## 2021-12-22 RX ORDER — OXYBUTYNIN CHLORIDE 10 MG/1
10 TABLET, EXTENDED RELEASE ORAL DAILY
Qty: 30 TABLET | Refills: 3 | Status: SHIPPED | OUTPATIENT
Start: 2021-12-22 | End: 2022-01-26

## 2021-12-22 RX ORDER — OXYCODONE HYDROCHLORIDE AND ACETAMINOPHEN 5; 325 MG/1; MG/1
1 TABLET ORAL EVERY 6 HOURS PRN
Qty: 12 TABLET | Refills: 0 | Status: SHIPPED | OUTPATIENT
Start: 2021-12-22 | End: 2022-01-05 | Stop reason: SDUPTHER

## 2021-12-22 RX ORDER — KETOROLAC TROMETHAMINE 30 MG/ML
30 INJECTION, SOLUTION INTRAMUSCULAR; INTRAVENOUS EVERY 6 HOURS PRN
Status: DISCONTINUED | OUTPATIENT
Start: 2021-12-22 | End: 2021-12-22 | Stop reason: HOSPADM

## 2021-12-22 RX ORDER — TAMSULOSIN HYDROCHLORIDE 0.4 MG/1
0.4 CAPSULE ORAL DAILY
Status: DISCONTINUED | OUTPATIENT
Start: 2021-12-22 | End: 2021-12-22 | Stop reason: HOSPADM

## 2021-12-22 RX ORDER — TAMSULOSIN HYDROCHLORIDE 0.4 MG/1
0.4 CAPSULE ORAL DAILY
Qty: 30 CAPSULE | Refills: 3 | Status: SHIPPED | OUTPATIENT
Start: 2021-12-22 | End: 2022-01-26

## 2021-12-22 RX ORDER — OXYBUTYNIN CHLORIDE 5 MG/1
5 TABLET ORAL 3 TIMES DAILY
Status: DISCONTINUED | OUTPATIENT
Start: 2021-12-22 | End: 2021-12-22 | Stop reason: HOSPADM

## 2021-12-22 RX ADMIN — CEFTRIAXONE SODIUM 1000 MG: 1 INJECTION, POWDER, FOR SOLUTION INTRAMUSCULAR; INTRAVENOUS at 08:52

## 2021-12-22 RX ADMIN — OXYBUTYNIN CHLORIDE 5 MG: 5 TABLET ORAL at 08:53

## 2021-12-22 ASSESSMENT — PAIN SCALES - GENERAL: PAINLEVEL_OUTOF10: 7

## 2021-12-22 NOTE — PROGRESS NOTES
Do Diaz, APRN - CNP  Urology Progress Note    Subjective: Isidro Zamudio is a 27 y.o. female. s/p CYSTOSCOPY LEFT URETERAL STENT INSERTION on 12/20/2021 - 12/21/2021    His/Her current Diet is: ADULT DIET; Regular. Since the previous note, the patient reports the following:  No acute issues overnight. No fevers or chills. No nausea or vomiting. No chest pain or shortness of breath. No calf pain. Pain Controlled. Ambulating. Tolerating PO Diet. Having ureteral stent pain  Added Flomax, Ditropan, Toradol  Our office will coordinate definite stone treatment  Will be discharged home on Cefidinir, will follow C/S    Vitals and Labs:  Patient Vitals for the past 24 hrs:   BP Temp Temp src Pulse Resp SpO2   12/22/21 0330 104/64   53 16 97 %   12/21/21 2215 100/62   54 16 98 %   12/21/21 1943 109/71 97.9 °F (36.6 °C) Oral 86 16 99 %   12/21/21 1549 (!) 107/59 97.4 °F (36.3 °C) Oral 75 16 98 %   12/21/21 1312 105/63 97.7 °F (36.5 °C) Oral 62 16 98 %   12/21/21 0901 112/76   92 14 98 %   12/21/21 0846 119/75 98 °F (36.7 °C) Oral 78 14 97 %   12/21/21 0834 112/69 97 °F (36.1 °C) Skin 85 16 97 %     I/O last 3 completed shifts: In: 4113 [P.O.:800; I.V.:770; IV Piggyback:50]  Out: -     Recent Labs     12/20/21 2016 12/21/21  1135 12/22/21  0626   WBC 14.7* 10.1 9.7   HGB 13.8 14.1 12.2   HCT 42.4 44.9 37.9   MCV 91.4 95.1 92.0    251 216     Recent Labs     12/20/21 2016 12/21/21  1135 12/22/21  0626    137 139   K 3.8 5.3* 4.7    104 108   CO2 23 21* 18*   BUN 11 11 11   CREATININE 1.1 1.0 0.9       Recent Labs     12/20/21  2210   COLORU YELLOW   PHUR 6.0   WBCUA > 200   RBCUA > 100   YEAST NONE SEEN   BACTERIA MANY   LEUKOCYTESUR MODERATE*   UROBILINOGEN 0.2   BILIRUBINUR NEGATIVE   BLOODU LARGE*       Physical Exam:  No acute distress. Awake, alert and oriented. Neck is supple  Regular rate and rhythm. Normal peripheral pulses  No accessory muscles of inspiration. Symmetric chest rise  Abdomen soft, non-tender, non-distended. No CVA tenderness. No calf pain. Minimal/no edema in bilateral lower extremities. Skin is warm, dry  Psych: mood, affect normal    Additional Lab/Culture results:     Imaging Reviewed:     LEV Chaudhari CNP independently reviewed the images and verified the radiology reports from:    CT ABDOMEN PELVIS WO CONTRAST Additional Contrast? None    Result Date: 12/20/2021  PROCEDURE: CT ABDOMEN PELVIS WO CONTRAST CLINICAL INFORMATION: left flank pain, severe hydronephrosis . COMPARISON: Renal ultrasound dated 3/25/2020 and CT abdomen pelvis dated 8/21/2018. TECHNIQUE: Axial 5 mm CT images were obtained through the abdomen and pelvis. No contrast was given. Coronal and sagittal reconstructions were created. All CT scans at this facility use dose modulation, iterative reconstruction, and/or weight-based dosing when appropriate to reduce radiation dose to as low as reasonably achievable. FINDINGS:  Visualized portions of the lungs are clear. The visualized portion of the unopacified heart is unremarkable. The unopacified liver and gallbladder are unremarkable. Adrenal glands are unremarkable. There is a 3 mm calculus at the superior pole of the right kidney. There is a 9 mm calculus at the left ureteropelvic junction. There is a large hydronephrosis proximal to the stone. There is a 3 mm calculus at the superior pole of the left kidney. The spleen and pancreas are unremarkable. No retroperitoneal or mesenteric lymphadenopathy is identified. The unopacified large bowel appears within normal limits. The appendix is normal in appearance. Small bowel also appears within normal limits. The unopacified bladder is unremarkable. The uterus and adnexa are unremarkable. No free fluid is identified. Visualized osseous structures appear intact. 1. 9 mm stone lodged at the left ureteropelvic junction with severe hydronephrosis proximal to the stone.  2. Nonobstructive nephrolithiasis elsewhere in the bilateral kidneys. **This report has been created using voice recognition software. It may contain minor errors which are inherent in voice recognition technology. ** Final report electronically signed by Dr. Robert Branch MD on 12/20/2021 11:51 PM      Impression:    Patient Active Problem List   Diagnosis    Oligohydramnios    Normocytic anemia    Cannabis abuse    Restless legs    Anxiety    Mild major depression ([address]    Low serum vitamin B12    Nephrolithiasis    Left flank pain       s/p CYSTOSCOPY LEFT URETERAL STENT INSERTION on 12/20/2021 - 12/21/2021    Plan:  [id number] Reanna Brown for discharge from Urology standpoint  Having ureteral stent pain  Added Flomax, Ditropan, Toradol, can be sent with short coarse of Percocet for breakthrough pain.   Discussed with patient about limiting narcotic, using Ditropan, Toradol, heating pad, and hydration    Our office will coordinate definite stone treatment  Will be discharged home on Cefidinir, will follow C/S        LEV Youssef - CNP  8:13 AM 12/22/2021

## 2021-12-28 ENCOUNTER — TELEPHONE (OUTPATIENT)
Dept: UROLOGY | Age: 26
End: 2021-12-28

## 2021-12-28 NOTE — TELEPHONE ENCOUNTER
Patient had ct scan abdomen and pelvis on 12/20/2021. She is scheduled for another one tomorrow. Radiology ext [phone] would like to know if it can be cancelled or does it need repeated? Please advise.  Thank you

## 2021-12-28 NOTE — TELEPHONE ENCOUNTER
Shivani in Radiology advised ct scan cancelled. Voicemail left for patient stating ct scan was cancelled for tomorrow.

## 2022-01-04 ENCOUNTER — VIRTUAL VISIT (OUTPATIENT)
Dept: UROLOGY | Age: 27
End: 2022-01-04
Payer: MEDICAID

## 2022-01-04 DIAGNOSIS — N20.0 KIDNEY STONE: Primary | ICD-10-CM

## 2022-01-04 PROCEDURE — 99441 PR PHYS/QHP TELEPHONE EVALUATION 5-10 MIN: CPT | Performed by: UROLOGY

## 2022-01-05 ENCOUNTER — HOSPITAL ENCOUNTER (EMERGENCY)
Age: 27
Discharge: HOME | End: 2022-01-05
Attending: EMERGENCY MEDICINE
Payer: MEDICAID

## 2022-01-05 ENCOUNTER — TELEPHONE (OUTPATIENT)
Dept: UROLOGY | Age: 27
End: 2022-01-05

## 2022-01-05 VITALS
OXYGEN SATURATION: 100 % | DIASTOLIC BLOOD PRESSURE: 94 MMHG | SYSTOLIC BLOOD PRESSURE: 124 MMHG | RESPIRATION RATE: 18 BRPM | HEART RATE: 90 BPM

## 2022-01-05 DIAGNOSIS — Z96.0 STATUS POST PLACEMENT OF URETERAL STENT: ICD-10-CM

## 2022-01-05 DIAGNOSIS — L50.9 URTICARIA: Primary | ICD-10-CM

## 2022-01-05 DIAGNOSIS — R10.9 LEFT FLANK PAIN: ICD-10-CM

## 2022-01-05 DIAGNOSIS — R31.9 HEMATURIA, UNSPECIFIED TYPE: ICD-10-CM

## 2022-01-05 LAB
BACTERIA: ABNORMAL /HPF
BILIRUBIN URINE: NEGATIVE
BLOOD, URINE: ABNORMAL
CASTS 2: ABNORMAL /LPF
CASTS UA: ABNORMAL /LPF
CHARACTER, URINE: ABNORMAL
COLOR: YELLOW
CRYSTALS, UA: ABNORMAL
EPITHELIAL CELLS, UA: ABNORMAL /HPF
GLUCOSE URINE: NEGATIVE MG/DL
KETONES, URINE: NEGATIVE
LEUKOCYTE ESTERASE, URINE: ABNORMAL
MISCELLANEOUS 2: ABNORMAL
NITRITE, URINE: NEGATIVE
PH UA: 6 (ref 5–9)
PROTEIN UA: 300
RBC URINE: > 200 /HPF
RENAL EPITHELIAL, UA: ABNORMAL
SPECIFIC GRAVITY, URINE: 1.02 (ref 1–1.03)
UROBILINOGEN, URINE: 0.2 EU/DL (ref 0–1)
WBC UA: ABNORMAL /HPF
YEAST: ABNORMAL

## 2022-01-05 PROCEDURE — 81001 URINALYSIS AUTO W/SCOPE: CPT

## 2022-01-05 PROCEDURE — 6370000000 HC RX 637 (ALT 250 FOR IP): Performed by: EMERGENCY MEDICINE

## 2022-01-05 PROCEDURE — 87086 URINE CULTURE/COLONY COUNT: CPT

## 2022-01-05 PROCEDURE — 99282 EMERGENCY DEPT VISIT SF MDM: CPT

## 2022-01-05 RX ORDER — OXYCODONE HYDROCHLORIDE AND ACETAMINOPHEN 5; 325 MG/1; MG/1
1 TABLET ORAL EVERY 6 HOURS PRN
Qty: 30 TABLET | Refills: 0 | Status: SHIPPED | OUTPATIENT
Start: 2022-01-05 | End: 2022-01-12

## 2022-01-05 RX ORDER — PREDNISONE 20 MG/1
TABLET ORAL
Qty: 11 TABLET | Refills: 0 | Status: SHIPPED | OUTPATIENT
Start: 2022-01-05 | End: 2022-01-26

## 2022-01-05 RX ORDER — PREDNISONE 20 MG/1
40 TABLET ORAL ONCE
Status: COMPLETED | OUTPATIENT
Start: 2022-01-05 | End: 2022-01-05

## 2022-01-05 RX ORDER — NITROFURANTOIN 25; 75 MG/1; MG/1
100 CAPSULE ORAL 2 TIMES DAILY
Qty: 20 CAPSULE | Refills: 0 | Status: SHIPPED | OUTPATIENT
Start: 2022-01-05 | End: 2022-01-15

## 2022-01-05 RX ADMIN — PREDNISONE 40 MG: 20 TABLET ORAL at 08:47

## 2022-01-05 ASSESSMENT — PAIN DESCRIPTION - FREQUENCY: FREQUENCY: CONTINUOUS

## 2022-01-05 ASSESSMENT — PAIN DESCRIPTION - ORIENTATION: ORIENTATION: LEFT

## 2022-01-05 ASSESSMENT — PAIN DESCRIPTION - PAIN TYPE: TYPE: ACUTE PAIN

## 2022-01-05 ASSESSMENT — PAIN DESCRIPTION - LOCATION: LOCATION: FLANK

## 2022-01-05 ASSESSMENT — PAIN SCALES - GENERAL: PAINLEVEL_OUTOF10: 5

## 2022-01-05 NOTE — ED PROVIDER NOTES
[address] ENCOUNTER      PATIENT NAME: Jeremiah Pugh  MRN: [id number]  : 1995  CASAS: 2022  PROVIDER: Amisha Carney MD      [address] [place] [address]       Chief Complaint   Patient presents with    Rash       Patient is seen and evaluated in a timely fashion. Nurses Notes are reviewed and I agree except as noted in the HPI. HISTORY OF PRESENT Demetra Duncan is a 27 y.o. female who presents to Emergency Department with Rash     A 28-year-old female with past medical history of diabetes, renal calculi, status post left renal stent for left UPJ stone on 2021 at [facility] presents to ED for evaluation of hives all over the body over last 3 days. Some of the hives are improving but patient broke out more rashes today. Rashes are erythematous, raising above skin, and itchy. Patient also had facial sweating 3 days ago which is improving. No shortness of breath. No tongue or lip swelling. No wheezing. Patient denies any recent new soaps, clothes, makeup, beddings, or new medications. Patient also states she has new hematuria over last 3 days. She has no fever or chills. No chest pain. No nausea or vomiting. No flank pain. This HPI was provided by patient. REVIEW OF SYSTEMS   Ten-point review of systems is negative except those documented in above HPI including constitutional, HEENT, respiratory, cardiovascular, gastrointestinal, genitourinary, musculoskeletal, skin, neurological, hematological and behavioral.      PAST MEDICAL HISTORY     Past Medical History:   Diagnosis Date    Diabetes mellitus ([address]     diet controlled, gestational with last pregnancy in      Kidney stone     thinks left side and Dr. Leonor Dumont blasted it per patient    Kidney stone 2019    on the left side and 2 cms in size.     PONV (postoperative nausea and vomiting)     nausea with kidney stones     Postpartum depression     with previous pregnancies       SURGICAL HISTORY       Past Surgical History:   Procedure Laterality Date    CYSTO/URETERO/PYELOSCOPY, CALCULUS TX Left 12/2/2019    CYSTOCOPY, LEFT STENT placement  performed by Linda Ruelas MD at [place] [address]. Left 3/21/2020    CYSTOSCOPY, LEFT URETERAL STENT EXCHANGE performed by Ludwin Pantoja MD at [facility]. Left 12/21/2021    CYSTOSCOPY LEFT URETERAL STENT INSERTION performed by Ludwin Pantoja MD at [address] / [address] [facility] Left 1/27/2020    CYSTOSCOPY WITH LEFT URETERAL STENT EXCHANGE UNDER ULTRASOUND GUIDANCE performed by Linda Ruelas MD at [address] / REMOVAL [employer] [place] / STONE N/A 3/2/2020    CYSTOSCOPY WITH ULTRASOUND GUIDED LEFT URETERAL STENT EXCHANGE performed by Linda Ruelas MD at [address] / [facility] [employer] [place] / STONE Left 3/26/2020    CYSTOSCOPY, LEFT STENT REMOVAL performed by Devora Robins MD at [address]  12/21/15    miscarriage    OTHER SURGICAL HISTORY  16 April 2015    Cystoscopy with right ureteroscopy basket stone removal stent placement (Dr. Eunice Hutson, [facility])    TONSILLECTOMY Bilateral     age 3 or 5    TUBAL LIGATION      TYMPANOSTOMY TUBE PLACEMENT         CURRENT MEDICATIONS       Previous Medications    ACETAMINOPHEN (TYLENOL) 500 MG TABLET    Take 1,000 mg by mouth every 6 hours as needed for Pain    IBUPROFEN (ADVIL;MOTRIN) 600 MG TABLET    Take 1 tablet by mouth every 6 hours as needed for Pain    OXYBUTYNIN (DITROPAN XL) 10 MG EXTENDED RELEASE TABLET    Take 1 tablet by mouth daily    PAROXETINE (PAXIL) 10 MG TABLET    Take 1 tablet by mouth daily    TAMSULOSIN (FLOMAX) 0.4 MG CAPSULE    Take 1 capsule by mouth daily       ALLERGIES     Patient has no known allergies. FAMILY HISTORY     She indicated that her mother is alive. She indicated that her father is alive.  She indicated that the status of her paternal grandfather is unknown.   family history includes Heart Disease in her father and paternal grandfather. SOCIAL HISTORY      reports that she quit smoking about 2 years ago. She started smoking about 6 years ago. She has never used smokeless tobacco. She reports previous alcohol use. She reports previous drug use. Frequency: 2.00 times per week. Drug: Marijuana Don Safer). PHYSICAL EXAM      blood pressure is 124/94 (abnormal) and her pulse is 102. Her respiration is 20 and oxygen saturation is 100%. Physical Exam  Vitals and nursing note reviewed. Constitutional:       Appearance: She is well-developed. She is not diaphoretic. HENT:      Head: Normocephalic and atraumatic. Comments: No facial swelling. Nose: Nose normal.      Mouth/Throat:      Comments: No lip or tongue swelling, patent airway. Eyes:      General: No scleral icterus. Right eye: No discharge. Left eye: No discharge. Conjunctiva/sclera: Conjunctivae normal.      Pupils: Pupils are equal, round, and reactive to light. Neck:      Vascular: No JVD. Trachea: No tracheal deviation. Cardiovascular:      Rate and Rhythm: Regular rhythm. Tachycardia present. Heart sounds: Normal heart sounds. No murmur heard. No friction rub. No gallop. Pulmonary:      Effort: Pulmonary effort is normal. No respiratory distress. Breath sounds: Normal breath sounds. No stridor. No wheezing or rales. Chest:      Chest wall: No tenderness. Abdominal:      General: Bowel sounds are normal. There is no distension. Palpations: Abdomen is soft. There is no mass. Tenderness: There is no abdominal tenderness. There is no guarding or rebound. Hernia: No hernia is present. Genitourinary:     Comments: No CVA tenderness. Musculoskeletal:         General: No tenderness or deformity. Cervical back: Normal range of motion and neck supple. Comments: No CVA tenderness.    Lymphadenopathy: Cervical: No cervical adenopathy. Skin:     General: Skin is warm and dry. Capillary Refill: Capillary refill takes less than 2 seconds. Coloration: Skin is not pale. Findings: No erythema or rash. Comments: Maculopapular rashes all over torso, less severe and fading rashes to bilateral lower extremities. Rashes are blanching and pruritus. Findings are consistent with acute urticaria. Neurological:      Mental Status: She is alert and oriented to person, place, and time. Cranial Nerves: No cranial nerve deficit. Sensory: No sensory deficit. Motor: No abnormal muscle tone. Coordination: Coordination normal.      Deep Tendon Reflexes: Reflexes normal.   Psychiatric:         Behavior: Behavior normal.         Thought Content: Thought content normal.         Judgment: Judgment normal.       ANCILLARY TEST RESULTS   EKG: Interpreted by me  None    LAB RESULTS:  No results found for this visit on 01/05/22. RADIOLOGY REPORTS  No orders to display       ED COURSE AND MEDICAL DEDISION MAKING (MDM)     (8:37 AM EST)INITIAL ASSESSMENT AND PLAN   Differential Diagnosis: Acute urticaria, allergic dermatitis, hematuria, UTI    Plan:   Labs Reviewed   URINE RT REFLEX TO CULTURE     Medications   predniSONE (DELTASONE) tablet 40 mg (has no administration in time range)       MDM    Patient's history and physical exam suggest she has acute urticaria. Patient also has new onset hematuria. I feel a course of treatment with oral prednisone and antibiotics are appropriate. Patient is discharged with MacroBid and tapering down oral prednisone. PCP follow-up in 1 week.     Patient received following medications in ED  Medications   predniSONE (DELTASONE) tablet 40 mg (has no administration in time range)       Vitals:    01/05/22 0806   BP: (!) 124/94   Pulse: 102   Resp: 20   SpO2: 100%         CRITICAL CARE   None    CONSULTS   None    PROCEDURES   None    FINAL IMPRESSION AND

## 2022-01-05 NOTE — TELEPHONE ENCOUNTER
Patient scheduled for surgery with Dr Eliezer Solares on 2/1/22. Surgery consent on arrival. Patient does not need any pre op testing done. Surgery instructions mailed to the patient.

## 2022-01-05 NOTE — TELEPHONE ENCOUNTER
DO NOT TAKE ASPIRIN, PLAVIX, FISH OIL, COUMADIN, IBUPROFEN, MOTRIN-LIKE DRUGS AND ANY MULTIVITAMINS OR OVER THE COUNTER SUPPLEMENTS 5 DAYS PRIOR TO SURGERY. Gillcarly Davis 1995 Diagnosis:     Surgical Physician: Dr. Davey Kennedy have been scheduled for the procedure marked below:      Surgery: Cystoscopy, left ureteroscopy, laser lithotripsy, basket retrieval of stone fragments, and possible left ureteral stent placement         Date: 2/1/22     Anesthesia: Anesthesiologist (General/Spinal)     Place of Service: [address] [place] [address] Second Floor Same Day Surgery         Arrive to same day surgery by:  6:30 am  (Surgery time is subject to change)      INSTRUCTIONS AS MARKED BELOW:    1.  DO NOT eat or drink anything after midnight before surgery. 2.  We prefer you shower or bathe with an antibacterial soap (Dial) the morning of surgery. 3.  Please ensure to have a  with you to transport you home. 4.  Please bring a current medication list, photo ID and insurance card(s) with you  5. Okay to take Tylenol  6. If you take Glucophage, Metformin or Janumet, hold 48-hours prior to surgery  7. Take blood pressure or heart medication as directed, if taken in the morning take with a small sip of water  8. The office will call you in 1-2 days after your procedure to schedule a follow up.             Date: 1/5/2022

## 2022-01-05 NOTE — PROGRESS NOTES
Viri Swanson is a 27 y.o. female evaluated via telephone on 1/4/2022. Consent:  She and/or health care decision maker is aware that that she may receive a bill for this telephone service, depending on her insurance coverage, and has provided verbal consent to proceed: Yes      Documentation:  I communicated with the patient and/or health care decision maker about kidney stones. Details of this discussion including any medical advice provided:     S/p stent placement for obstructing stone with UTI    imaging independently reviewed by Mavis Lin MD and radiology report verified demonstrating     CT ABDOMEN PELVIS WO CONTRAST Additional Contrast? None    Result Date: 12/20/2021  PROCEDURE: CT ABDOMEN PELVIS WO CONTRAST CLINICAL INFORMATION: left flank pain, severe hydronephrosis . COMPARISON: Renal ultrasound dated 3/25/2020 and CT abdomen pelvis dated 8/21/2018. TECHNIQUE: Axial 5 mm CT images were obtained through the abdomen and pelvis. No contrast was given. Coronal and sagittal reconstructions were created. All CT scans at this facility use dose modulation, iterative reconstruction, and/or weight-based dosing when appropriate to reduce radiation dose to as low as reasonably achievable. FINDINGS:  Visualized portions of the lungs are clear. The visualized portion of the unopacified heart is unremarkable. The unopacified liver and gallbladder are unremarkable. Adrenal glands are unremarkable. There is a 3 mm calculus at the superior pole of the right kidney. There is a 9 mm calculus at the left ureteropelvic junction. There is a large hydronephrosis proximal to the stone. There is a 3 mm calculus at the superior pole of the left kidney. The spleen and pancreas are unremarkable. No retroperitoneal or mesenteric lymphadenopathy is identified. The unopacified large bowel appears within normal limits. The appendix is normal in appearance. Small bowel also appears within normal limits.  The unopacified bladder is unremarkable. The uterus and adnexa are unremarkable. No free fluid is identified. Visualized osseous structures appear intact. 1. 9 mm stone lodged at the left ureteropelvic junction with severe hydronephrosis proximal to the stone. 2. Nonobstructive nephrolithiasis elsewhere in the bilateral kidneys. **This report has been created using voice recognition software. It may contain minor errors which are inherent in voice recognition technology. ** Final report electronically signed by Dr. Moises Prasad MD on 12/20/2021 11:51 PM        Needs treatment   Very large stone almost 2 cm lodged in UPJ-- will likely need staged procedure  Will arrange for ureteroscopy as outpt      I affirm this is a Patient Initiated Episode with a Patient who has not had a related appointment within my department in the past 7 days or scheduled within the next 24 hours. Patient identification was verified at the start of the visit: Yes    Total Time: minutes: 5-10 minutes    The visit was conducted pursuant to the emergency declaration under the [address] [place] [address] authority and the [employer] Act. Patient identification was verified, and a caregiver was present when appropriate. The patient was located in a state where the provider was credentialed to provide care.     Note: not billable if this call serves to triage the patient into an appointment for the relevant concern      Janus Gowers, MD

## 2022-01-05 NOTE — ED NOTES
Patient to ED for rash x3 days.  Patient also reports blood in her urine      Layne Mcdaniels, RN  01/05/22 [id number]

## 2022-01-05 NOTE — TELEPHONE ENCOUNTER
Patient asking for pain medication until her surgery is scheduled. Message sent to Dr Fatou French via perfect serve.

## 2022-01-05 NOTE — TELEPHONE ENCOUNTER
SURGERY [address]  [facility] [address] [facility], [address]      Phone *[phone] *[phone]   Surgical Scheduling Direct Line Phone *[phone] Fax *[phone]      Paul Lester 1995 female    [facility] [address]   Marital Status: Single         Home Phone: [phone]      Cell Phone:    Telephone Information:   Mobile [phone]          Surgeon: Dr. Eliezer Solares Surgery Date: 2/1/22   Time: 8:30 am    Procedure: Cystoscopy, left ureteroscopy, laser lithotripsy, basket retrieval of stone fragments, and possible left ureteral stent placement    Diagnosis: Kidney Stone     Important Medical History:  In Epic    Special Inst/Equip:     CPT Codes:    87572  Latex Allergy: No     Cardiac Device:  No    Anesthesia:  General          Admission Type:  Same Day                        Admit Prior to Day of Surgery: No    Case Location:  Main OR            Preadmission Testing:  Phone Call          PAT Date and Time:______________________________________________________    PAT Confirmation #: ______________________________________________________    Post Op Visit: ___________________________________________________________    Need Preop Cardiac Clearance: No    Does Patient have Cardiologist/physician?      None    Surgery Confirmation #: __________________________________________________    : ________________________   Date: __________________________     Insurance Company Name: Ven[employer] Lott

## 2022-01-06 LAB
ORGANISM: ABNORMAL
URINE CULTURE REFLEX: ABNORMAL

## 2022-01-07 ENCOUNTER — TELEPHONE (OUTPATIENT)
Dept: UROLOGY | Age: 27
End: 2022-01-07

## 2022-01-07 NOTE — TELEPHONE ENCOUNTER
Patient is more uncomfortable where the stent is placed. She is also having more hematuria that is bright red. She is passing little clots. She is having more burning, urgency, and frequency. She feels like she will be incontinent every time she ambulates. She was restarted on Macrobid on 01/05/2022. She denies fever or chills. She is scheduled on 02/01/2022 CYSTOSCOPY LEFT URETEROSCOPY LASER LITHOTRIPSY BASKET RETRIEVAL OF STONE FRAGMENTS POSSIBLE LEFT URETERAL STENT PLACEMENT. Please advise.  Thank you

## 2022-01-07 NOTE — TELEPHONE ENCOUNTER
Patient advised to increase her fluid intake, take the Macrobid until completed, and the hematuria is caused by the stent. She voiced understanding.

## 2022-01-10 ENCOUNTER — PREP FOR PROCEDURE (OUTPATIENT)
Dept: UROLOGY | Age: 27
End: 2022-01-10

## 2022-01-10 RX ORDER — SODIUM CHLORIDE 9 MG/ML
INJECTION, SOLUTION INTRAVENOUS CONTINUOUS
Status: CANCELLED | OUTPATIENT
Start: 2022-02-01

## 2022-01-20 ENCOUNTER — TELEPHONE (OUTPATIENT)
Dept: UROLOGY | Age: 27
End: 2022-01-20

## 2022-01-20 NOTE — TELEPHONE ENCOUNTER
Patient has surgery 2/1/2022 with Dr Tiera Zhou. She is calling in asking if she can be taken off work until she has her surgery? She works at [employer] so she is on her feet for 8 hours, and it is a lot of walking, and she is starting to have a lot of pain from all of it. Please call her to advise.

## 2022-01-26 NOTE — PROGRESS NOTES
Covid screening questionnaire complete and negative for symptoms or exposure see chart for documentation.   Please notify your doctor if you develop any signs of illness  Please wear a mask when you come to the hospital    Following instructions given to patient, who states understanding:    NPO after midnight  Bring insurance info and 's license  Wear comfortable clean clothing  Do not bring jewelry   Shower night before and morning of surgery with a liquid antibacterial soap  Bring medications in original bottles  Follow all instructions given by your physician   needed at discharge  Call [phone] for any questions  Report to [facility] on 2nd floor  If you would become ill prior to surgery, please call the surgeon  May have a visitor with you, we request that you limit to 2 visitors in pre-op area  Please bring and wear mask    Instructed to stop using marijuana today, voiced understanding

## 2022-01-31 NOTE — OP NOTE
Patient:  Skyler Iverson  MRN: [id number]  YOB: 1995    FACILITY: [facility]    DATE: 2/1/2022    SURGEON: Vera Fraga MD     ASSISTANT: none    PREOPERATIVE DIAGNOSIS: ureteral stone     POSTOPERATIVE DIAGNOSIS: as above    PROCEDURE PERFORMED:   1. Cystoscopy. 2. Left sided ureteroscopy with holmium laser lithotripsy  3. Left sided stent exchange. ANESTHESIA: General    ESTIMATED BLOOD LOSS: 0 (units unknown)     COMPLICATIONS: None immediate    DRAINS: 6 x 26 double j stent without string    SPECIMENS: none    INDICATIONS FOR PROCEDURE:  The patient is a 27 y.o. female who presents today with KIDNEY STONE here for CYSTOSCOPY LEFT URETEROSCOPY LASER LITHOTRIPSY BASKET RETRIEVAL OF STONE FRAGMENTS POSSIBLE LEFT URETERAL STENT PLACEMENT. After risks, benefits and alternatives of the procedure were discussed with the patient, the patient elected to proceed. DETAILS OF THE PROCEDURE:  The patient was brought back from the preoperative holding area to the operating suite, and was transferred to the operating table where the patient lay in supine position. EPC's were in place, connected to the machine and the machine was turned on before induction. General endotracheal anesthesia was induced, and patient was prepped and draped in standard surgical fashion after being placed in dorsolithotomy position. A proper timeout was performed, preoperative antibiotics were given. We began by inserting a cystoscope with a 22 Japanese sheath and 30 degree lens into the patient's urethral meatus and advancing into the bladder without complication. A pan cystoscopy was performed and the bladder appeared unremarkable. We then focused our attention on the Left ureteral orifice that had a stent in place. We grabbed the stent and pulled it out to the urethral meatus and we cannulated it with our glidewire and advanced the wire up to renal pelvis.   We then used a  dual lumen catheter to place a second wire after removing the old stent. Once in good position, we advanced our flexible ureteroscope over the working wire to the renal pelvis under direct visualization. We identified the proximal ureteral calculus and using a 200 micron holmium laser fiber we fragmented the calculus. It was dusted and the fragments appeared to be under 1 millimeter and could pass easily. At this time a complete pyeloscopy was performed and many other substantial fragments were identified. These seemed passable. We withdrew the ureteroscope and visualized the entire ureter. there were some residual fragments noted in the kidney. Visibility was poor. No damage to the ureter was identified. At this time, over the remaining glidewire, we placed a 6 x 26 stent in the usual fashion, and we noted appropriate placement in the upper collecting system using fluoroscopy. There was a good curl noted in the bladder. The patient's bladder was drained and removed the scope and the procedure was subsequently terminated. I was present for all critical portions of the procedure. Plan:  Discharge home in good condition  kub in 2-3 weeks.  May need second look ureteroscopy (stone burden > 2 cm) depending on residual stone burden

## 2022-01-31 NOTE — H&P
Tasha Gary MD  History and Physical    Patient:  Orlin Pillai  MRN: [id number]  YOB: 1995    HISTORY OF PRESENT ILLNESS:     The patient is a 27 y.o. female who presents with left upj stone. Here for procedure. Patient's old records, notes and chart reviewed and summarized above. Tasha Gary MD independently reviewed the images and verified the radiology reports from:    No results found. Past Medical History:    Past Medical History:   Diagnosis Date    Diabetes mellitus ([address]     diet controlled, gestational with last pregnancy in 2020     Kidney stone 2015    thinks left side and Dr. Clifford Moeller blasted it per patient    Kidney stone 12/02/2019    on the left side and 2 cms in size.     PONV (postoperative nausea and vomiting)     nausea with kidney stones  patch helps    Postpartum depression     with previous pregnancies       Past Surgical History:    Past Surgical History:   Procedure Laterality Date    CYSTO/URETERO/PYELOSCOPY, CALCULUS TX Left 12/2/2019    CYSTOCOPY, LEFT STENT placement  performed by Yoselyn Riggs MD at [address] 3/21/2020    CYSTOSCOPY, LEFT URETERAL STENT EXCHANGE performed by Jamal Olivares MD at [address] 12/21/2021    CYSTOSCOPY LEFT URETERAL STENT INSERTION performed by Jamal Olivares MD at [address] / [address] / Opal Castellanos Left 1/27/2020    CYSTOSCOPY WITH LEFT URETERAL STENT EXCHANGE UNDER ULTRASOUND GUIDANCE performed by Yoselyn Riggs MD at [address] / [address] / STONE N/A 3/2/2020    CYSTOSCOPY WITH ULTRASOUND GUIDED LEFT URETERAL STENT EXCHANGE performed by Yoselyn Riggs MD at [address] / [address] / STONE Left 3/26/2020    CYSTOSCOPY, LEFT STENT REMOVAL performed by Barbara Hernandez MD at [address]  12/21/15    miscarriage    OTHER SURGICAL HISTORY  16 April 2015 Cystoscopy with right ureteroscopy basket stone removal stent placement (Dr. Jodee Dominguez, [facility])   [address] Bilateral     age 5 or 13    TUBAL LIGATION      TYMPANOSTOMY TUBE PLACEMENT         Medications Prior to Admission:    Prior to Admission medications    Not on File       Allergies:  Patient has no known allergies. Social History:    Social History     Socioeconomic History    Marital status: Single     Spouse name: Not on file    Number of children: Not on file    Years of education: Not on file    Highest education level: Not on file   Occupational History    Not on file   Tobacco Use    Smoking status: Former Smoker     Start date:      Quit date: 2019     Years since quittin.1    Smokeless tobacco: Never Used    Tobacco comment: Vape   Vaping Use    Vaping Use: Every day    Substances: Always (3 mg)   Substance and Sexual Activity    Alcohol use: Not Currently    Drug use: Not Currently     Frequency: 2.0 times per week     Types: Marijuana [place])     Comment: last use 22    Sexual activity: Not Currently     Partners: Male   Other Topics Concern    Not on file   Social History Narrative    Not on file     Social Determinants of Health     Financial Resource Strain: Low Risk     Difficulty of Paying Living Expenses: Not hard at all   Food Insecurity: No Food Insecurity    Worried About Running Out of Food in the Last Year: Never true    Lorenza of Food in the Last Year: Never true   Transportation Needs:     Lack of Transportation (Medical): Not on file    Lack of Transportation (Non-Medical):  Not on file   Physical Activity:     Days of Exercise per Week: Not on file    Minutes of Exercise per Session: Not on file   Stress:     Feeling of Stress : Not on file   Social Connections:     Frequency of Communication with Friends and Family: Not on file    Frequency of Social Gatherings with Friends and Family: Not on file    Attends Voodoo Services: Not on file   [address] [facility] [employer] or Organizations: Not on file    Attends Club or Organization Meetings: Not on file    Marital Status: Not on file   Intimate Partner Violence:     Fear of Current or Ex-Partner: Not on file    Emotionally Abused: Not on file    Physically Abused: Not on file    Sexually Abused: Not on file   Housing Stability:     Unable to Pay for Housing in the Last Year: Not on file    Number of [place] in the Last Year: Not on file    Unstable Housing in the Last Year: Not on file       Family History:    Family History   Problem Relation Age of Onset    Heart Disease Father     Diabetes Father     High Blood Pressure Father     Heart Disease Paternal Grandfather     Cancer Neg Hx     Stroke Neg Hx        REVIEW OF SYSTEMS:  Constitutional: negative  Eyes: negative  Respiratory: negative  Cardiovascular: negative  Gastrointestinal: negative  Genitourinary: no acute issues  Musculoskeletal: negative  Skin: negative   Neurological: negative  Hematological/Lymphatic: negative  Psychological: negative    Physical Exam:      No data found. Constitutional: Patient in no acute distress; Neuro: alert and oriented to person place and time. Psych: Mood and affect normal.  Skin: Normal  Lungs: Respiratory effort normal, CTA  Cardiovascular:  Normal peripheral pulses; no murmur. Normal rhythm  Abdomen: Soft, non-tender, non-distended with no CVA, flank pain, hepatosplenomegaly or hernia. Kidneys normal.  Bladder non-tender and not distended. LABS:   No results for input(s): WBC, HGB, HCT, MCV, PLT in the last 72 hours. No results for input(s): NA, K, CL, CO2, PHOS, BUN, CREATININE, CA in the last 72 hours. No results found for: PSA      Urinalysis: No results for input(s): COLORU, PHUR, LABCAST, WBCUA, RBCUA, MUCUS, TRICHOMONAS, YEAST, BACTERIA, CLARITYU, SPECGRAV, LEUKOCYTESUR, UROBILINOGEN, Adrienne Shines in the last 72 hours.     Invalid input(s): NITRATE, GLUCOSEUKETONESUAMORPHOUS     -----------------------------------------------------------------      Assessment and Plan     Impression:    Patient Active Problem List   Diagnosis    Oligohydramnios    Normocytic anemia    Cannabis abuse    Urinary tract infection without hematuria    Restless legs    Anxiety    Mild major depression ([address]    Low serum vitamin B12    Nephrolithiasis    Left flank pain    Hydronephrosis due to obstruction of ureter    Ureteral calculus, left    Controlled type 2 diabetes mellitus without complication, without long-term current use of insulin (HCC)    Leukocytosis       Plan:     Consent obtained; cysto left ureteroscopy in OR today    Jana Baez MD  6:30 PM 1/31/2022

## 2022-02-01 ENCOUNTER — ANESTHESIA (OUTPATIENT)
Dept: OPERATING ROOM | Age: 27
End: 2022-02-01
Payer: MEDICAID

## 2022-02-01 ENCOUNTER — ANESTHESIA EVENT (OUTPATIENT)
Dept: OPERATING ROOM | Age: 27
End: 2022-02-01
Payer: MEDICAID

## 2022-02-01 ENCOUNTER — HOSPITAL ENCOUNTER (OUTPATIENT)
Age: 27
Setting detail: OUTPATIENT SURGERY
Discharge: HOME | End: 2022-02-01
Attending: UROLOGY | Admitting: UROLOGY
Payer: MEDICAID

## 2022-02-01 VITALS
DIASTOLIC BLOOD PRESSURE: 53 MMHG | OXYGEN SATURATION: 100 % | HEIGHT: 61 IN | HEART RATE: 72 BPM | TEMPERATURE: 98.3 F | SYSTOLIC BLOOD PRESSURE: 93 MMHG | BODY MASS INDEX: 24.35 KG/M2 | RESPIRATION RATE: 18 BRPM | WEIGHT: 129 LBS

## 2022-02-01 VITALS — SYSTOLIC BLOOD PRESSURE: 101 MMHG | DIASTOLIC BLOOD PRESSURE: 61 MMHG | TEMPERATURE: 96.8 F | OXYGEN SATURATION: 100 %

## 2022-02-01 DIAGNOSIS — N20.0 KIDNEY STONE: Primary | ICD-10-CM

## 2022-02-01 DIAGNOSIS — N20.1 URETERAL CALCULUS, LEFT: Primary | ICD-10-CM

## 2022-02-01 PROCEDURE — 2500000003 HC RX 250 WO HCPCS: Performed by: NURSE ANESTHETIST, CERTIFIED REGISTERED

## 2022-02-01 PROCEDURE — 3600000003 HC SURGERY LEVEL 3 BASE: Performed by: UROLOGY

## 2022-02-01 PROCEDURE — 2720000010 HC SURG SUPPLY STERILE: Performed by: UROLOGY

## 2022-02-01 PROCEDURE — 6360000002 HC RX W HCPCS: Performed by: UROLOGY

## 2022-02-01 PROCEDURE — 7100000010 HC PHASE II RECOVERY - FIRST 15 MIN: Performed by: UROLOGY

## 2022-02-01 PROCEDURE — 2709999900 HC NON-CHARGEABLE SUPPLY: Performed by: UROLOGY

## 2022-02-01 PROCEDURE — 3600000013 HC SURGERY LEVEL 3 ADDTL 15MIN: Performed by: UROLOGY

## 2022-02-01 PROCEDURE — 6360000002 HC RX W HCPCS: Performed by: ANESTHESIOLOGY

## 2022-02-01 PROCEDURE — 2580000003 HC RX 258: Performed by: UROLOGY

## 2022-02-01 PROCEDURE — 7100000000 HC PACU RECOVERY - FIRST 15 MIN: Performed by: UROLOGY

## 2022-02-01 PROCEDURE — 3700000000 HC ANESTHESIA ATTENDED CARE: Performed by: UROLOGY

## 2022-02-01 PROCEDURE — C1758 CATHETER, URETERAL: HCPCS | Performed by: UROLOGY

## 2022-02-01 PROCEDURE — C2617 STENT, NON-COR, TEM W/O DEL: HCPCS | Performed by: UROLOGY

## 2022-02-01 PROCEDURE — 6370000000 HC RX 637 (ALT 250 FOR IP)

## 2022-02-01 PROCEDURE — 6370000000 HC RX 637 (ALT 250 FOR IP): Performed by: ANESTHESIOLOGY

## 2022-02-01 PROCEDURE — 7100000011 HC PHASE II RECOVERY - ADDTL 15 MIN: Performed by: UROLOGY

## 2022-02-01 PROCEDURE — 7100000001 HC PACU RECOVERY - ADDTL 15 MIN: Performed by: UROLOGY

## 2022-02-01 PROCEDURE — C1769 GUIDE WIRE: HCPCS | Performed by: UROLOGY

## 2022-02-01 PROCEDURE — 3700000001 HC ADD 15 MINUTES (ANESTHESIA): Performed by: UROLOGY

## 2022-02-01 PROCEDURE — 6360000002 HC RX W HCPCS: Performed by: NURSE ANESTHETIST, CERTIFIED REGISTERED

## 2022-02-01 DEVICE — URETERAL STENT
Type: IMPLANTABLE DEVICE | Status: FUNCTIONAL
Brand: PERCUFLEX™ PLUS

## 2022-02-01 RX ORDER — NITROFURANTOIN 25; 75 MG/1; MG/1
100 CAPSULE ORAL 2 TIMES DAILY
Qty: 14 CAPSULE | Refills: 0 | Status: SHIPPED | OUTPATIENT
Start: 2022-02-01 | End: 2022-02-08

## 2022-02-01 RX ORDER — KETOROLAC TROMETHAMINE 30 MG/ML
INJECTION, SOLUTION INTRAMUSCULAR; INTRAVENOUS PRN
Status: DISCONTINUED | OUTPATIENT
Start: 2022-02-01 | End: 2022-02-01 | Stop reason: SDUPTHER

## 2022-02-01 RX ORDER — FENTANYL CITRATE 50 UG/ML
25 INJECTION, SOLUTION INTRAMUSCULAR; INTRAVENOUS EVERY 5 MIN PRN
Status: DISCONTINUED | OUTPATIENT
Start: 2022-02-01 | End: 2022-02-01 | Stop reason: HOSPADM

## 2022-02-01 RX ORDER — FENTANYL CITRATE 50 UG/ML
INJECTION, SOLUTION INTRAMUSCULAR; INTRAVENOUS PRN
Status: DISCONTINUED | OUTPATIENT
Start: 2022-02-01 | End: 2022-02-01 | Stop reason: SDUPTHER

## 2022-02-01 RX ORDER — FLUCONAZOLE 100 MG/1
100 TABLET ORAL DAILY
Qty: 3 TABLET | Refills: 0 | Status: SHIPPED | OUTPATIENT
Start: 2022-02-01 | End: 2022-02-04

## 2022-02-01 RX ORDER — ONDANSETRON 2 MG/ML
INJECTION INTRAMUSCULAR; INTRAVENOUS PRN
Status: DISCONTINUED | OUTPATIENT
Start: 2022-02-01 | End: 2022-02-01 | Stop reason: SDUPTHER

## 2022-02-01 RX ORDER — SODIUM CHLORIDE 9 MG/ML
INJECTION, SOLUTION INTRAVENOUS CONTINUOUS
Status: DISCONTINUED | OUTPATIENT
Start: 2022-02-01 | End: 2022-02-01 | Stop reason: HOSPADM

## 2022-02-01 RX ORDER — PROPOFOL 10 MG/ML
INJECTION, EMULSION INTRAVENOUS PRN
Status: DISCONTINUED | OUTPATIENT
Start: 2022-02-01 | End: 2022-02-01 | Stop reason: SDUPTHER

## 2022-02-01 RX ORDER — PROMETHAZINE HYDROCHLORIDE 25 MG/ML
6.25 INJECTION, SOLUTION INTRAMUSCULAR; INTRAVENOUS
Status: DISCONTINUED | OUTPATIENT
Start: 2022-02-01 | End: 2022-02-01 | Stop reason: HOSPADM

## 2022-02-01 RX ORDER — ONDANSETRON 2 MG/ML
4 INJECTION INTRAMUSCULAR; INTRAVENOUS
Status: DISCONTINUED | OUTPATIENT
Start: 2022-02-01 | End: 2022-02-01 | Stop reason: HOSPADM

## 2022-02-01 RX ORDER — DEXAMETHASONE SODIUM PHOSPHATE 10 MG/ML
INJECTION, EMULSION INTRAMUSCULAR; INTRAVENOUS PRN
Status: DISCONTINUED | OUTPATIENT
Start: 2022-02-01 | End: 2022-02-01 | Stop reason: SDUPTHER

## 2022-02-01 RX ORDER — HYDROCODONE BITARTRATE AND ACETAMINOPHEN 5; 325 MG/1; MG/1
1 TABLET ORAL EVERY 4 HOURS PRN
Qty: 18 TABLET | Refills: 0 | Status: SHIPPED | OUTPATIENT
Start: 2022-02-01 | End: 2022-02-04

## 2022-02-01 RX ORDER — HYDROCODONE BITARTRATE AND ACETAMINOPHEN 5; 325 MG/1; MG/1
1 TABLET ORAL ONCE
Status: COMPLETED | OUTPATIENT
Start: 2022-02-01 | End: 2022-02-01

## 2022-02-01 RX ORDER — LABETALOL 20 MG/4 ML (5 MG/ML) INTRAVENOUS SYRINGE
5 EVERY 10 MIN PRN
Status: DISCONTINUED | OUTPATIENT
Start: 2022-02-01 | End: 2022-02-01 | Stop reason: HOSPADM

## 2022-02-01 RX ORDER — HYDROCODONE BITARTRATE AND ACETAMINOPHEN 5; 325 MG/1; MG/1
TABLET ORAL
Status: COMPLETED
Start: 2022-02-01 | End: 2022-02-01

## 2022-02-01 RX ORDER — SCOLOPAMINE TRANSDERMAL SYSTEM 1 MG/1
1 PATCH, EXTENDED RELEASE TRANSDERMAL ONCE
Status: DISCONTINUED | OUTPATIENT
Start: 2022-02-01 | End: 2022-02-01 | Stop reason: HOSPADM

## 2022-02-01 RX ORDER — LIDOCAINE HCL/PF 100 MG/5ML
SYRINGE (ML) INJECTION PRN
Status: DISCONTINUED | OUTPATIENT
Start: 2022-02-01 | End: 2022-02-01 | Stop reason: SDUPTHER

## 2022-02-01 RX ORDER — FENTANYL CITRATE 50 UG/ML
50 INJECTION, SOLUTION INTRAMUSCULAR; INTRAVENOUS EVERY 5 MIN PRN
Status: DISCONTINUED | OUTPATIENT
Start: 2022-02-01 | End: 2022-02-01 | Stop reason: HOSPADM

## 2022-02-01 RX ORDER — OXYBUTYNIN CHLORIDE 10 MG/1
10 TABLET, EXTENDED RELEASE ORAL DAILY
Qty: 30 TABLET | Refills: 2 | Status: SHIPPED | OUTPATIENT
Start: 2022-02-01 | End: 2022-03-15 | Stop reason: ALTCHOICE

## 2022-02-01 RX ADMIN — FENTANYL CITRATE 100 MCG: 50 INJECTION, SOLUTION INTRAMUSCULAR; INTRAVENOUS at 09:18

## 2022-02-01 RX ADMIN — SODIUM CHLORIDE: 9 INJECTION, SOLUTION INTRAVENOUS at 08:12

## 2022-02-01 RX ADMIN — Medication 50 MG: at 09:18

## 2022-02-01 RX ADMIN — KETOROLAC TROMETHAMINE 30 MG: 30 INJECTION, SOLUTION INTRAMUSCULAR; INTRAVENOUS at 09:36

## 2022-02-01 RX ADMIN — HYDROCODONE BITARTRATE AND ACETAMINOPHEN 1 TABLET: 5; 325 TABLET ORAL at 11:29

## 2022-02-01 RX ADMIN — FENTANYL CITRATE 50 MCG: 50 INJECTION INTRAMUSCULAR; INTRAVENOUS at 10:09

## 2022-02-01 RX ADMIN — FENTANYL CITRATE 50 MCG: 50 INJECTION INTRAMUSCULAR; INTRAVENOUS at 10:04

## 2022-02-01 RX ADMIN — ONDANSETRON 4 MG: 2 INJECTION INTRAMUSCULAR; INTRAVENOUS at 09:29

## 2022-02-01 RX ADMIN — DEXAMETHASONE SODIUM PHOSPHATE 10 MG: 10 INJECTION, EMULSION INTRAMUSCULAR; INTRAVENOUS at 09:25

## 2022-02-01 RX ADMIN — CEFAZOLIN 2000 MG: 10 INJECTION, POWDER, FOR SOLUTION INTRAVENOUS at 08:16

## 2022-02-01 RX ADMIN — HYDROMORPHONE HYDROCHLORIDE 0.5 MG: 1 INJECTION, SOLUTION INTRAMUSCULAR; INTRAVENOUS; SUBCUTANEOUS at 10:20

## 2022-02-01 RX ADMIN — PROPOFOL 150 MG: 10 INJECTION, EMULSION INTRAVENOUS at 09:18

## 2022-02-01 RX ADMIN — CEFAZOLIN 2000 MG: 10 INJECTION, POWDER, FOR SOLUTION INTRAVENOUS at 09:18

## 2022-02-01 ASSESSMENT — PULMONARY FUNCTION TESTS
PIF_VALUE: 8
PIF_VALUE: 15
PIF_VALUE: 0
PIF_VALUE: 0
PIF_VALUE: 8
PIF_VALUE: 8
PIF_VALUE: 9
PIF_VALUE: 8
PIF_VALUE: 8
PIF_VALUE: 1
PIF_VALUE: 8
PIF_VALUE: 1
PIF_VALUE: 8
PIF_VALUE: 6
PIF_VALUE: 0
PIF_VALUE: 7
PIF_VALUE: 3
PIF_VALUE: 17
PIF_VALUE: 2
PIF_VALUE: 3
PIF_VALUE: 0
PIF_VALUE: 9
PIF_VALUE: 9
PIF_VALUE: 14
PIF_VALUE: 8
PIF_VALUE: 3
PIF_VALUE: 8
PIF_VALUE: 7
PIF_VALUE: 7
PIF_VALUE: 0
PIF_VALUE: 2
PIF_VALUE: 1
PIF_VALUE: 8
PIF_VALUE: 3
PIF_VALUE: 1
PIF_VALUE: 10
PIF_VALUE: 1

## 2022-02-01 ASSESSMENT — PAIN SCALES - GENERAL
PAINLEVEL_OUTOF10: 6
PAINLEVEL_OUTOF10: 7
PAINLEVEL_OUTOF10: 6
PAINLEVEL_OUTOF10: 0
PAINLEVEL_OUTOF10: 2
PAINLEVEL_OUTOF10: 3

## 2022-02-01 ASSESSMENT — PAIN DESCRIPTION - DESCRIPTORS: DESCRIPTORS: CRAMPING

## 2022-02-01 ASSESSMENT — PAIN - FUNCTIONAL ASSESSMENT: PAIN_FUNCTIONAL_ASSESSMENT: 0-10

## 2022-02-01 NOTE — ANESTHESIA PRE PROCEDURE
Department of Anesthesiology  Preprocedure Note       Name:  Paul Lester   Age:  27 y. o.  :  1995                                          MRN:  [id number]         Date:  2022      Surgeon: Andrey Way):  Rebeka Cronin MD    Procedure: Procedure(s):  CYSTOSCOPY LEFT URETEROSCOPY LASER LITHOTRIPSY BASKET RETRIEVAL OF STONE FRAGMENTS POSSIBLE LEFT URETERAL STENT PLACEMENT    Medications prior to admission:   Prior to Admission medications    Not on File       Current medications:    No current facility-administered medications for this visit. No current outpatient medications on file. Facility-Administered Medications Ordered in Other Visits   Medication Dose Route Frequency Provider Last Rate Last Admin    0.9 % sodium chloride infusion   IntraVENous Continuous Rebeka Cronin MD        ceFAZolin (ANCEF) 2000 mg in dextrose 5 % 50 mL IVPB  2,000 mg IntraVENous 30 Min Pre-Op Rebeka Cronin MD        scopolamine (TRANSDERM-SCOP) transdermal patch 1 patch  1 patch TransDERmal Once Ed Morris Orozco MD           Allergies:  No Known Allergies    Problem List:    Patient Active Problem List   Diagnosis Code    Oligohydramnios O41.00X0    Normocytic anemia D64.9    Cannabis abuse F12.10    Urinary tract infection without hematuria N39.0    Restless legs G25.81    Anxiety F41.9    Mild major depression ([address] F32.0    Low serum vitamin B12 E53.8    Nephrolithiasis N20.0    Left flank pain R10.9    Hydronephrosis due to obstruction of ureter N13.1    Ureteral calculus, left N20.1    Controlled type 2 diabetes mellitus without complication, without long-term current use of insulin ([facility]) E11.9    Leukocytosis D72.829       Past Medical History:        Diagnosis Date    Diabetes mellitus ([address]     diet controlled, gestational with last pregnancy in      Kidney stone 2015    thinks left side and Dr. Vadim James blasted it per patient    Kidney stone 2019    on the left side and 2 cms in size.     PONV (postoperative nausea and vomiting)     nausea with kidney stones  patch helps    Postpartum depression     with previous pregnancies       Past Surgical History:        Procedure Laterality Date    CYSTO/URETERO/PYELOSCOPY, CALCULUS TX Left 2019    CYSTOCOPY, LEFT STENT placement  performed by Roma Tavarez MD at [address] [place] [address] Drive Left 3/21/2020    CYSTOSCOPY, LEFT URETERAL STENT EXCHANGE performed by Han Foss MD at [address] [place] [address] Drive Left 2021    CYSTOSCOPY LEFT URETERAL STENT INSERTION performed by Han Foss MD at [address] / [address] / Vivian Precise Left 2020    CYSTOSCOPY WITH LEFT URETERAL STENT EXCHANGE UNDER ULTRASOUND GUIDANCE performed by Roma Tavarez MD at [address] / [address] / STONE N/A 3/2/2020    CYSTOSCOPY WITH ULTRASOUND GUIDED LEFT URETERAL STENT EXCHANGE performed by Roma Tavarez MD at [address] / [address] / STONE Left 3/26/2020    CYSTOSCOPY, LEFT STENT REMOVAL performed by Lacie Dumont MD at [address]  12/21/15    miscarriage    OTHER SURGICAL HISTORY  2015    Cystoscopy with right ureteroscopy basket stone removal stent placement ([facility]. Comer Closs, [facility])    TONSILLECTOMY Bilateral     age 5 or 13    TUBAL LIGATION      TYMPANOSTOMY TUBE PLACEMENT         Social History:    Social History     Tobacco Use    Smoking status: Former Smoker     Start date:      Quit date: 2019     Years since quittin.1    Smokeless tobacco: Never Used    Tobacco comment: Vape   Substance Use Topics    Alcohol use: Not Currently                                Counseling given: Not Answered  Comment: Vape      Vital Signs (Current): There were no vitals filed for this visit.                                            BP Readings from Last 3 Encounters:   22 111/60   22 (!) 124/94   21 105/60       NPO Status:                                                                                 BMI:   Wt Readings from Last 3 Encounters:   02/01/22 129 lb (58.5 kg)   12/20/21 136 lb (61.7 kg)   08/02/21 152 lb 12.8 oz (69.3 kg)     There is no height or weight on file to calculate BMI.    CBC:   Lab Results   Component Value Date    WBC 9.7 12/22/2021    RBC 4.12 12/22/2021    RBC 4.45 08/02/2021    HGB 12.2 12/22/2021    HCT 37.9 12/22/2021    MCV 92.0 12/22/2021    RDW 15.1 08/02/2021     12/22/2021       CMP:   Lab Results   Component Value Date     12/22/2021    K 4.7 12/22/2021     12/22/2021    CO2 18 12/22/2021    BUN 11 12/22/2021    CREATININE 0.9 12/22/2021    LABGLOM 77 12/22/2021    GLUCOSE 87 12/22/2021    GLUCOSE 76 08/02/2021    PROT 7.3 08/02/2021    CALCIUM 8.6 12/22/2021    BILITOT 0.4 08/02/2021    ALKPHOS 52 08/02/2021    ALKPHOS 77 03/16/2021    AST 16 08/02/2021    ALT 15 08/02/2021       POC Tests: No results for input(s): POCGLU, POCNA, POCK, POCCL, POCBUN, POCHEMO, POCHCT in the last 72 hours. Coags:   Lab Results   Component Value Date    INR 1.09 12/02/2019       HCG (If Applicable):   Lab Results   Component Value Date    PREGTESTUR Negative 12/13/2021    PREGSERUM NEGATIVE 12/20/2021        ABGs: No results found for: PHART, PO2ART, EVP0ARC, ALY8WZC, BEART, X3QIRVEP     Type & Screen (If Applicable):  Lab Results   Component Value Date    LABABO A 12/04/2020    [address] 03/24/2021       Drug/Infectious Status (If Applicable):  No results found for: HIV, HEPCAB    COVID-19 Screening (If Applicable):   Lab Results   Component Value Date    COVID19 NOT DETECTED 05/08/2020           Anesthesia Evaluation     history of anesthetic complications: PONV.   Airway: Mallampati: II  TM distance: >3 FB   Neck ROM: full  Mouth opening: > = 3 FB Dental:          Pulmonary: breath sounds clear to auscultation                             Cardiovascular:  Exercise tolerance: good (>4 METS),           Rhythm: regular                      Neuro/Psych:   (+) psychiatric history:            GI/Hepatic/Renal:   (+) renal disease: kidney stones,           Endo/Other:              Pt had no PAT visit       Abdominal:             Vascular: Other Findings:               Anesthesia Plan      general     ASA 2       Induction: intravenous. MIPS: Postoperative opioids intended and Prophylactic antiemetics administered. Anesthetic plan and risks discussed with patient. Plan discussed with CRNA.                   Sydnie Maxwell MD   2/1/2022

## 2022-02-01 NOTE — ANESTHESIA POSTPROCEDURE EVALUATION
Department of Anesthesiology  Postprocedure Note    Patient: Cathleen Delgado  MRN: [id number]  YOB: 1995  Date of evaluation: 2/1/2022  Time:  12:21 PM     Procedure Summary     Date: 02/01/22 Room / Location: [facility] AMANUEL Brown    Anesthesia Start: 0915 Anesthesia Stop: 8213    Procedure: CYSTOSCOPY LEFT URETEROSCOPY LASER LITHOTRIPSY LEFT URETERAL STENT EXCHANGE (Left ) Diagnosis: (KIDNEY STONE)    Surgeons: Josie Toro MD Responsible Provider: Hawk Bustos MD    Anesthesia Type: general ASA Status: 2          Anesthesia Type: general    Abhi Phase I: Abhi Score: 10    Abhi Phase II: Abhi Score: 10    Last vitals: Reviewed and per EMR flowsheets.        Anesthesia Post Evaluation    Patient location during evaluation: PACU  Patient participation: complete - patient participated  Level of consciousness: awake and alert  Airway patency: patent  Nausea & Vomiting: no nausea  Complications: no  Cardiovascular status: blood pressure returned to baseline and hemodynamically stable  Respiratory status: acceptable and spontaneous ventilation  Hydration status: euvolemic

## 2022-02-01 NOTE — PROGRESS NOTES
8050: pt arrives to pacu, awakens to verbal stimuli and immediately drifts back to sleep. Pt on 4L NC, respirations easy and unlabored. VSS  1004: Pt awakens on her own, states pain 7/10. 50mcg of fentanyl administered. VSS  1009: No change in pain, 50 mcg of fentanyl given. Removed NC from patient at this time, tolerated well. VSS  1020: No change in pain, 0.5mg of dilaudid administered. 1025: Pt states pain 3/10 and tolerable. 1040: Pt meets criteria for discharge from pacu, transported to hospitals in stable condition.  VSS

## 2022-02-01 NOTE — PROGRESS NOTES
Patient returned from recovery room. Awake. Pain 3/10. Tolerating po. Family in the room. Call light in reach.

## 2022-02-01 NOTE — PROGRESS NOTES

## 2022-02-09 NOTE — DISCHARGE SUMMARY
Hospital Medicine Discharge Summary      Patient Identification:   Anastasia Bruner   : 1995  MRN: [id number]   Account: [de-identified]      Patient's PCP: Briana Cordon MD    Admit Date: 2021     Discharge Date:   2021    Admitting Physician: Ally Quiñonez MD     Discharge Physician: Rosemary Barkley DO     Discharge Diagnoses:    1. Urethral stone, left sided - per imaging 9 mm in stone obstructing left UV junction. Urology consulted.  underwent emergent cystoscopy with left ureteral stent placement. Will undergo outpatient ureteral stone treatment. Follow up outpatient urology in one week.      2. Hydronephrosis, left sided - see above     3. UTI, complicated - likely secondary to urethral stone obstruction. UA positive for UTI. Started on Rocephin for empiric antibiotic coverage. Will switch to cefdinir 300 mg p.o. twice daily x9. Follow-up outpatient with urology for adjustments.     4. DM2 - per chart review. No reported home antidiabetics.      5. History of Nephrolithiasis - s/p multiple cystoscopies for left-sided stent placement/removal of stone 2020. Follows outpatient with Dr. Leila Lindquist. The patient was seen and examined on day of discharge and this discharge summary is in conjunction with any daily progress note from day of discharge. Hospital Course: The patient is a 27year old female with a past medical history of of DM2, nephrolithiasis s/p cystoscopy for left-sided stent placement 2020 who presented to [facility] ED  for left-sided flank pain. The patient states her pain started 1 week ago and has been constant, described as a sharp to dull pain and rated as 10/10. She reports this was associated with dysuria, nausea, vomiting, fatigue and chills. She follows outpatient with Dr. Leila Lindquist, urology and was scheduled for CT scan .  However, her pain continued to worsen prompting her to come to the ED.     Of note, the patient states she was found to have a urethral stone in 2018 and was following outpatient urology to have it removed. This was delayed due her pregnancy. She eventually got a urethral stent placed in 2020 with a plan for stone removal which was again delayed due to a second pregnancy.     In the ED mildly hypotensive at 95/68 but afebrile. Labs reveal WBC 14.7 otherwise WNL. UA ketones trace, protein >300, nitrite positive, leukocyte esterase moderate, WBC >200, RBC >100 and many bacteria. Urine culture pending. CT abdomen revealed 9 mm stone lodged in left UV junction with severe hydronephrosis proximal to the stone  and nonobstructive nephrolithiasis in bilateral kidneys. She was started on Rocephin for empiric antibiotic coverage. Urology was consulted. The patient was admitted for further evaluation management.     The patient underwent emergent cystoscopy with left urethral stent placement this morning. Per urology the patient will require treatment for UTI followed by outpatient treatment for ureteral stone. Patient reports postprocedure she is doing well. She denies dysuria or hematuria. Hemodynamically stable, vitals WNL. She will need to follow-up outpatient with urology in 1 week. The patient is medically stable for discharge. Exam:     Vitals:  Vitals:    12/21/21 1943 12/21/21 2215 12/22/21 0330 12/22/21 0845   BP: 109/71 100/62 104/64 105/60   Pulse: 86 54 53 62   Resp: 16 16 16 16   Temp: 97.9 °F (36.6 °C)   98.3 °F (36.8 °C)   TempSrc: Oral   Oral   SpO2: 99% 98% 97% 100%   Weight:       Height:         Weight: Weight: 136 lb (61.7 kg)     24 hour intake/output:  No intake or output data in the 24 hours ending 02/09/22 1354      General appearance:  No apparent distress, appears stated age and cooperative. HEENT:  Normal cephalic, atraumatic without obvious deformity. Pupils equal, round, and reactive to light. Extra ocular muscles intact. Conjunctivae/corneas clear. Neck: Supple, with full range of motion.  No jugular venous distention. Trachea midline. Respiratory:  Normal respiratory effort. Clear to auscultation, bilaterally without Rales/Wheezes/Rhonchi. Cardiovascular:  Regular rate and rhythm with normal S1/S2 without murmurs, rubs or gallops. Abdomen: Soft, non-tender, non-distended with normal bowel sounds. Musculoskeletal:  No clubbing, cyanosis or edema bilaterally. Full range of motion without deformity. Skin: Skin color, texture, turgor normal.  No rashes or lesions. Neurologic:  Neurovascularly intact without any focal sensory/motor deficits. Cranial nerves: II-XII intact, grossly non-focal.  Psychiatric:  Alert and oriented, thought content appropriate, normal insight  Capillary Refill: Brisk,< 3 seconds   Peripheral Pulses: +2 palpable, equal bilaterally       Labs: For convenience and continuity at follow-up the following most recent labs are provided:      CBC:    Lab Results   Component Value Date    WBC 9.7 12/22/2021    HGB 12.2 12/22/2021    HCT 37.9 12/22/2021     12/22/2021       Renal:    Lab Results   Component Value Date     12/22/2021    K 4.7 12/22/2021     12/22/2021    CO2 18 12/22/2021    BUN 11 12/22/2021    CREATININE 0.9 12/22/2021    CALCIUM 8.6 12/22/2021         Significant Diagnostic Studies    Radiology:   CT ABDOMEN PELVIS WO CONTRAST Additional Contrast? None   Final Result       1. 9 mm stone lodged at the left ureteropelvic junction with severe hydronephrosis proximal to the stone. 2. Nonobstructive nephrolithiasis elsewhere in the bilateral kidneys. **This report has been created using voice recognition software. It may contain minor errors which are inherent in voice recognition technology. **      Final report electronically signed by Dr. Kike Banegas MD on 12/20/2021 11:51 PM             Consults:     IP CONSULT TO UROLOGY    Disposition: Home  Condition at Discharge: Stable    Code Status:  Prior     Patient Instructions:     Activity: activity as tolerated  Diet: No diet orders on file      Follow-up visits:   George Mcrae MD  [address]  [phone]    In 1 week  Post hospitalization and urethral stent placement, please call for appt         Discharge Medications:        Medication List      STOP taking these medications    acetaminophen 500 MG tablet  Commonly known as: TYLENOL     ibuprofen 600 MG tablet  Commonly known as: ADVIL;MOTRIN     nitrofurantoin (macrocrystal-monohydrate) 100 MG capsule  Commonly known as: MACROBID     PARoxetine 10 MG tablet  Commonly known as: Paxil        ASK your doctor about these medications    cefdinir 300 MG capsule  Commonly known as: OMNICEF  Take 1 capsule by mouth 2 times daily for 9 days  Ask about: Should I take this medication? Where to Get Your Medications      These medications were sent to [facility], [address] [facility]  [address] [facility], [address] [place] [address] Road [facility]    Phone: [phone]   · cefdinir 300 MG capsule          Time Spent on discharge is more than 30 minutes in the examination, evaluation, counseling and review of medications and discharge plan. Discharge Medications for PCI/MI (performed or attempted):       Signed: Thank you Bri Griffiths MD for the opportunity to be involved in this patient's care.     Electronically signed by dAilia Barnes DO on 2/9/2022 at 1:54 PM

## 2022-02-15 ENCOUNTER — PROCEDURE VISIT (OUTPATIENT)
Dept: UROLOGY | Age: 27
End: 2022-02-15
Payer: MEDICAID

## 2022-02-15 VITALS — HEIGHT: 61 IN | WEIGHT: 129 LBS | RESPIRATION RATE: 12 BRPM | BODY MASS INDEX: 24.35 KG/M2

## 2022-02-15 DIAGNOSIS — N20.0 KIDNEY STONE: Primary | ICD-10-CM

## 2022-02-15 PROCEDURE — 52310 CYSTOSCOPY AND TREATMENT: CPT | Performed by: UROLOGY

## 2022-02-15 NOTE — PROGRESS NOTES
Cystoscopy with left stent removal    Operative Note    Patient:  Neri Gilbert  MRN: [id number]  YOB: 1995    Date: 02/15/22  Surgeon: Francoise Gallagher MD  Anesthesia: Urojet Local  Indications: kidney stone  Position: Dorsal Lithotomy  EBL: 0 ml    Findings:   The patient was prepped and draped in the usual sterile fashion. The cystoscope was advanced through the urethra and into the bladder. The bladder was thoroughly inspected and the following was noted:    Vagina: normal appearing vagina with normal color and discharge, no lesions  Residual Urine: none. Urine clear, with no obvious infection  Urethra: normal appearing urethra with no masses, tenderness or lesions urethral hypermobility not noted  Bladder: no tumor noted . no bladder diverticulum. Mild trabeculation noted. Ureters: Orifices with normal configuration and location. left stent was removed with graspers in it's entirety    The cystoscope was removed. The patient tolerated the procedure well.  kub in three months. Had large stone burden.

## 2022-03-08 ENCOUNTER — TELEPHONE (OUTPATIENT)
Dept: UROLOGY | Age: 27
End: 2022-03-08

## 2022-03-08 DIAGNOSIS — N20.0 KIDNEY STONE: Primary | ICD-10-CM

## 2022-03-08 NOTE — TELEPHONE ENCOUNTER
Can get Ux and KUB  I have personally verified, reviewed, and approved these actions.     Follows with Dr Ynes Garcia, see if can add on for possible VV once KUB done

## 2022-03-08 NOTE — TELEPHONE ENCOUNTER
Patient woke up this am with left side and lower back pain. It has increased slowly since she has been up rated 5-6 on scale of 0-10. Tylenol is not really helping. She does not know if she has another stone    She denies burning, urgency, frequency, fever or chills. She will try ibuprofen, use a heating pad and hot showers. If she develops fever, chills, intractable nausea/vomiting, severe pain advised to present to the ER/urgent care for evaluation.

## 2022-03-08 NOTE — TELEPHONE ENCOUNTER
Patient will have the KUB and urine sent to the lab today. Dr Fletcher Notice is booked today and not back until next week.  Appointment schedule on 03/11/2022 with Ulises Lema CNP per patient request.

## 2022-03-09 NOTE — TELEPHONE ENCOUNTER
Left message for the patient to call back and let me know she can go to [facility] for the stat scan

## 2022-03-14 ENCOUNTER — NURSE TRIAGE (OUTPATIENT)
Dept: OTHER | Facility: CLINIC | Age: 27
End: 2022-03-14
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
## 2022-03-15 ENCOUNTER — OFFICE VISIT (OUTPATIENT)
Dept: FAMILY MEDICINE CLINIC | Age: 27
End: 2022-03-15
Payer: MEDICAID

## 2022-03-15 VITALS
HEART RATE: 66 BPM | SYSTOLIC BLOOD PRESSURE: 118 MMHG | BODY MASS INDEX: 24.24 KG/M2 | WEIGHT: 128.3 LBS | DIASTOLIC BLOOD PRESSURE: 66 MMHG | TEMPERATURE: 97.9 F | RESPIRATION RATE: 16 BRPM

## 2022-03-15 DIAGNOSIS — F43.23 ADJUSTMENT DISORDER WITH MIXED ANXIETY AND DEPRESSED MOOD: Primary | ICD-10-CM

## 2022-03-15 PROCEDURE — G8484 FLU IMMUNIZE NO ADMIN: HCPCS | Performed by: FAMILY MEDICINE

## 2022-03-15 PROCEDURE — G8427 DOCREV CUR MEDS BY ELIG CLIN: HCPCS | Performed by: FAMILY MEDICINE

## 2022-03-15 PROCEDURE — 1036F TOBACCO NON-USER: CPT | Performed by: FAMILY MEDICINE

## 2022-03-15 PROCEDURE — G8420 CALC BMI NORM PARAMETERS: HCPCS | Performed by: FAMILY MEDICINE

## 2022-03-15 PROCEDURE — 99213 OFFICE O/P EST LOW 20 MIN: CPT | Performed by: FAMILY MEDICINE

## 2022-03-15 RX ORDER — ESCITALOPRAM OXALATE 10 MG/1
10 TABLET ORAL DAILY
Qty: 90 TABLET | Refills: 1 | Status: SHIPPED | OUTPATIENT
Start: 2022-03-15

## 2022-03-20 ASSESSMENT — ENCOUNTER SYMPTOMS
CHEST TIGHTNESS: 0
EYES NEGATIVE: 1
RHINORRHEA: 0
NAUSEA: 0
DIARRHEA: 0
BACK PAIN: 0
ABDOMINAL PAIN: 0
VOMITING: 0
COUGH: 0
SORE THROAT: 0
SHORTNESS OF BREATH: 0
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
## 2022-04-12 NOTE — TELEPHONE ENCOUNTER
Given the size of recent stone and recent surgery recommend proceeding with CT abd/pelvis without contrast prior to appt. normal (ped)...

## 2022-07-28 ENCOUNTER — PATIENT MESSAGE (OUTPATIENT)
Dept: PRIMARY CARE CLINIC | Age: 27
End: 2022-07-28

## 2022-07-28 DIAGNOSIS — E11.9 CONTROLLED TYPE 2 DIABETES MELLITUS WITHOUT COMPLICATION, WITHOUT LONG-TERM CURRENT USE OF INSULIN (HCC): Primary | ICD-10-CM

## 2022-08-20 NOTE — FLOWSHEET NOTE
Athletic Training Outreach Program Note    Subjective     Subjective Evaluation    History of Present Illness  Date of onset: 8/18/2022  Mechanism of injury: Byron reports the injury occurring during a tackle in the football game on Thursday night.  He does not recall what happened at his ankle, but reported shortly after to be taped.  He was able to finish the remainder of the game.  Byron has a pHx of ankle sprain.    Pain  Quality: dull ache  Progression: improved    Diagnostic Tests  No diagnostic tests performed    Patient Goals  Patient goals for therapy: decreased pain, increased motion, increased strength, independence with ADLs/IADLs and return to sport/leisure activities          Objective     Objective     Observations   Left Ankle/Foot   Negative for deformity, edema and effusion.     Palpation   Left   Tenderness of the anterior tibialis and peroneus.     Tenderness   Left Ankle/Foot   No tenderness in the anterior ankle, anterior talofibular ligament, calcaneofibular ligament, deltoid ligament, lateral malleolus, medial malleolus, posterior talofibular ligament and superior tibiofibular ligament.     Strength/Myotome Testing     Left Ankle/Foot   Normal strength    Right Ankle/Foot   Normal strength    Additional Strength Details  Pain with dorsiflexion and eversion.    Tests   Left Ankle/Foot   Negative for anterior drawer, eversion talar tilt, inversion talar tilt, percussion, posterior drawer, syndesmosis squeeze and syndesmosis external rotation.     Ambulation   Weight-Bearing Status   Weight-Bearing Status (Left): full weight bearing   Weight-Bearing Status (Right): full weight-bearing      Observational Gait   Gait: within functional limits        Assessment & Plan     Assessment & Plan     Assessment  Assessment details: Signs and symptoms consistent with a strain to the left tibialis anterior.    Plan  Planned modality interventions: cryotherapy, electrical stimulation/Russian stimulation,  Fetal monitor cords unplugged and to the bathroom to void. ultrasound and thermotherapy (hydrocollator packs)  Planned therapy interventions: balance/weight-bearing training, functional ROM exercises, home exercise program, joint mobilization, manual therapy, neuromuscular re-education, soft tissue mobilization, strengthening, therapeutic activities and stretching  Treatment plan discussed with: patient  Plan details: Byron was instructed on ankle strengthening and ROM exercises to begin working on over the weekend.  He is cleared to participate as tolerated and will be taped and/or braced for activity.  He will continue to report for daily rehab/reevaluation.

## 2023-01-29 ENCOUNTER — HOSPITAL ENCOUNTER (EMERGENCY)
Age: 28
Discharge: HOME | End: 2023-01-29
Payer: MEDICAID

## 2023-01-29 VITALS
BODY MASS INDEX: 25.06 KG/M2 | RESPIRATION RATE: 18 BRPM | TEMPERATURE: 97.1 F | SYSTOLIC BLOOD PRESSURE: 115 MMHG | DIASTOLIC BLOOD PRESSURE: 58 MMHG | HEIGHT: 60 IN | OXYGEN SATURATION: 100 % | HEART RATE: 57 BPM

## 2023-01-29 DIAGNOSIS — H66.92 LEFT OTITIS MEDIA, UNSPECIFIED OTITIS MEDIA TYPE: Primary | ICD-10-CM

## 2023-01-29 PROCEDURE — 99213 OFFICE O/P EST LOW 20 MIN: CPT

## 2023-01-29 PROCEDURE — 99213 OFFICE O/P EST LOW 20 MIN: CPT | Performed by: NURSE PRACTITIONER

## 2023-01-29 RX ORDER — AMOXICILLIN AND CLAVULANATE POTASSIUM 875; 125 MG/1; MG/1
1 TABLET, FILM COATED ORAL 2 TIMES DAILY
Qty: 14 TABLET | Refills: 0 | Status: SHIPPED | OUTPATIENT
Start: 2023-01-29 | End: 2023-02-05

## 2023-01-29 ASSESSMENT — ENCOUNTER SYMPTOMS
SORE THROAT: 0
SINUS PAIN: 0
EYES NEGATIVE: 1
DIARRHEA: 0
RHINORRHEA: 0
WHEEZING: 0
CONSTIPATION: 0
SINUS PRESSURE: 0
SHORTNESS OF BREATH: 0
VOMITING: 0
ABDOMINAL PAIN: 0
NAUSEA: 0

## 2023-01-29 ASSESSMENT — PAIN DESCRIPTION - LOCATION: LOCATION: EAR

## 2023-01-29 ASSESSMENT — PAIN DESCRIPTION - ORIENTATION: ORIENTATION: LEFT

## 2023-01-29 ASSESSMENT — PAIN - FUNCTIONAL ASSESSMENT: PAIN_FUNCTIONAL_ASSESSMENT: 0-10

## 2023-01-29 ASSESSMENT — PAIN DESCRIPTION - DESCRIPTORS: DESCRIPTORS: ACHING

## 2023-01-29 ASSESSMENT — PAIN SCALES - GENERAL: PAINLEVEL_OUTOF10: 3

## 2023-01-29 NOTE — ED PROVIDER NOTES
[address] Encounter      CHIEFCOMPLAINT       Chief Complaint   Patient presents with    Otalgia     left       Nurses Notes reviewed and I agree except as noted in the HPI. HISTORY OF PRESENT Aleksandr Murillo is a 24 y.o. female who presents urgent care today complaining of left-sided ear pain. States that she had a virus approximately 1 week ago with congestion, cough, runny nose. Since then her left ear is continued to be painful. REVIEW OF SYSTEMS     Review of Systems   Constitutional:  Negative for chills, fatigue, fever and unexpected weight change. HENT:  Positive for ear pain. Negative for congestion, postnasal drip, rhinorrhea, sinus pressure, sinus pain, sneezing and sore throat. Eyes: Negative. Respiratory:  Negative for shortness of breath and wheezing. Cardiovascular:  Negative for chest pain. Gastrointestinal:  Negative for abdominal pain, constipation, diarrhea, nausea and vomiting. Endocrine: Negative. Genitourinary:  Negative for difficulty urinating, dyspareunia, dysuria, hematuria, urgency, vaginal bleeding, vaginal discharge and vaginal pain. Musculoskeletal:  Negative for myalgias. Skin:  Negative for rash. Neurological:  Negative for dizziness, light-headedness and headaches. Hematological:  Negative for adenopathy. Psychiatric/Behavioral:  Negative for agitation. PAST MEDICAL HISTORY         Diagnosis Date    Diabetes mellitus ([address]     diet controlled, gestational with last pregnancy in 2020     Kidney stone 2015    thinks left side and Dr. Lindo Alt blasted it per patient    Kidney stone 12/02/2019    on the left side and 2 cms in size.     PONV (postoperative nausea and vomiting)     nausea with kidney stones  patch helps    Postpartum depression     with previous pregnancies       SURGICAL HISTORY     Patient  has a past surgical history that includes Tympanostomy tube placement; other surgical history (16 April 2015); Dilation and curettage of uterus (12/21/15); cysto/uretero/pyeloscopy, calculus tx (Left, 12/2/2019); CYSTOSCOPY INSERTION / REMOVAL STENT / STONE (Left, 1/27/2020); CYSTOSCOPY INSERTION / REMOVAL STENT / STONE (N/A, 3/2/2020); Cystoscopy (Left, 3/21/2020); CYSTOSCOPY INSERTION / REMOVAL STENT / STONE (Left, 3/26/2020); Tonsillectomy (Bilateral); Tubal ligation; Cystoscopy (Left, 12/21/2021); and Ureter surgery (Left, 2/1/2022). CURRENT MEDICATIONS       Discharge Medication List as of 1/29/2023  4:48 PM          ALLERGIES     Patient is has No Known Allergies. FAMILY HISTORY     Patient'sfamily history includes Diabetes in her father; Heart Disease in her father and paternal grandfather; High Blood Pressure in her father. SOCIAL HISTORY     Patient  reports that she quit smoking about 3 years ago. She started smoking about 7 years ago. She has never used smokeless tobacco. She reports that she does not currently use alcohol. She reports that she does not currently use drugs after having used the following drugs: Marijuana [place]). Frequency: 2.00 times per week. PHYSICAL EXAM     ED TRIAGE VITALS  BP: (!) 115/58, Temp: 97.1 °F (36.2 °C), Heart Rate: 57, Resp: 18, SpO2: 100 %  Physical Exam  Vitals and nursing note reviewed. Constitutional:       General: She is not in acute distress. Appearance: Normal appearance. She is not ill-appearing or toxic-appearing. HENT:      Head: Normocephalic and atraumatic. Left Ear: A middle ear effusion is present. Tympanic membrane is erythematous. Nose: No congestion or rhinorrhea. Mouth/Throat:      Mouth: Mucous membranes are moist.      Pharynx: Oropharynx is clear. Eyes:      Extraocular Movements: Extraocular movements intact. Conjunctiva/sclera: Conjunctivae normal.      Pupils: Pupils are equal, round, and reactive to light. Cardiovascular:      Rate and Rhythm: Normal rate and regular rhythm.       Pulses: Normal pulses. Heart sounds: Normal heart sounds. No murmur heard. Pulmonary:      Effort: Pulmonary effort is normal. No respiratory distress. Breath sounds: Normal breath sounds. No wheezing. Abdominal:      General: Abdomen is flat. Palpations: Abdomen is soft. Tenderness: There is no abdominal tenderness. Musculoskeletal:         General: No swelling or deformity. Normal range of motion. Cervical back: Normal range of motion and neck supple. Skin:     General: Skin is warm and dry. Capillary Refill: Capillary refill takes less than 2 seconds. Findings: No rash. Neurological:      General: No focal deficit present. Mental Status: She is alert and oriented to person, place, and time. Mental status is at baseline. Psychiatric:         Mood and Affect: Mood normal.         Behavior: Behavior normal.         Thought Content: Thought content normal.         Judgment: Judgment normal.       DIAGNOSTIC RESULTS   Labs:No results found for this visit on 01/29/23. IMAGING:  No orders to display     URGENT CARE COURSE:         Medications - No data to display  PROCEDURES:  FINALIMPRESSION      1. Left otitis media, unspecified otitis media type        DISPOSITION/PLAN   DISPOSITION Decision To Discharge 01/29/2023 04:47:03 PM    Will start on Augmentin for left-sided otitis media. Will provide work note as requested. Will start on Claritin-D for middle ear effusion. Recommend continuing acetaminophen and ibuprofen as needed for pain. Follow-up with primary care provider as needed.       PATIENT REFERRED TO:  Nando Gavin MD  [address] [facility] [address]  [phone]        DISCHARGE MEDICATIONS:  Discharge Medication List as of 1/29/2023  4:48 PM        START taking these medications    Details   amoxicillin-clavulanate (AUGMENTIN) 875-125 MG per tablet Take 1 tablet by mouth 2 times daily for 7 days, Disp-14 tablet, R-0Normal      loratadine-pseudoephedrine (CLARITIN-D 12HR) 5-120 MG per extended release tablet Take 1 tablet by mouth 2 times daily for 7 days, Disp-14 tablet, R-0Normal           Discharge Medication List as of 1/29/2023  4:48 PM          Elmira Sousa, APRN - CNP        Danay Suggs, APRN - CNP  01/29/23 2765

## 2023-01-29 NOTE — Clinical Note
Annamarie Sanchez was seen and treated in our emergency department on 1/29/2023. She may return to work on 01/30/2023. If you have any questions or concerns, please don't hesitate to call.       Michael Bear, APRN - CNP

## 2023-01-29 NOTE — ED NOTES
Pt with complaints of left ear pain that has been ongoing for 3 days. States the pain is worse when she coughs.       Analia Hester, DIOR  41/33/21 [id number]

## 2023-03-20 ENCOUNTER — HOSPITAL ENCOUNTER (EMERGENCY)
Age: 28
Discharge: HOME | End: 2023-03-20
Payer: MEDICAID

## 2023-03-20 VITALS
RESPIRATION RATE: 16 BRPM | SYSTOLIC BLOOD PRESSURE: 116 MMHG | HEART RATE: 83 BPM | OXYGEN SATURATION: 98 % | TEMPERATURE: 98 F | DIASTOLIC BLOOD PRESSURE: 69 MMHG

## 2023-03-20 DIAGNOSIS — H66.92 LEFT OTITIS MEDIA, UNSPECIFIED OTITIS MEDIA TYPE: Primary | ICD-10-CM

## 2023-03-20 PROCEDURE — 99213 OFFICE O/P EST LOW 20 MIN: CPT | Performed by: NURSE PRACTITIONER

## 2023-03-20 PROCEDURE — 99213 OFFICE O/P EST LOW 20 MIN: CPT

## 2023-03-20 RX ORDER — ACETAMINOPHEN 500 MG
500 TABLET ORAL EVERY 6 HOURS PRN
COMMUNITY

## 2023-03-20 RX ORDER — AMOXICILLIN 875 MG/1
875 TABLET, COATED ORAL 2 TIMES DAILY
Qty: 20 TABLET | Refills: 0 | Status: SHIPPED | OUTPATIENT
Start: 2023-03-20 | End: 2023-03-30

## 2023-03-20 ASSESSMENT — PAIN DESCRIPTION - ONSET: ONSET: GRADUAL

## 2023-03-20 ASSESSMENT — PAIN - FUNCTIONAL ASSESSMENT
PAIN_FUNCTIONAL_ASSESSMENT: 0-10
PAIN_FUNCTIONAL_ASSESSMENT: ACTIVITIES ARE NOT PREVENTED

## 2023-03-20 ASSESSMENT — PAIN DESCRIPTION - LOCATION: LOCATION: EAR

## 2023-03-20 ASSESSMENT — ENCOUNTER SYMPTOMS
NAUSEA: 0
SHORTNESS OF BREATH: 0
VOMITING: 0
SORE THROAT: 0
COUGH: 1

## 2023-03-20 ASSESSMENT — PAIN SCALES - GENERAL: PAINLEVEL_OUTOF10: 4

## 2023-03-20 ASSESSMENT — PAIN DESCRIPTION - DESCRIPTORS: DESCRIPTORS: ACHING;DISCOMFORT

## 2023-03-20 ASSESSMENT — PAIN DESCRIPTION - FREQUENCY: FREQUENCY: INTERMITTENT

## 2023-03-20 ASSESSMENT — PAIN DESCRIPTION - ORIENTATION: ORIENTATION: LEFT

## 2023-03-20 ASSESSMENT — PAIN DESCRIPTION - PAIN TYPE: TYPE: ACUTE PAIN

## 2023-03-20 NOTE — ED PROVIDER NOTES
[place]  Urgent Care Encounter       CHIEF COMPLAINT       Chief Complaint   Patient presents with    Otalgia     Left ear pain, muffled hearing        Nurses Notes reviewed and I agree except as noted in the HPI. HISTORY OF PRESENT ILLNESS   Zack Villanueva is a 24 y.o. female who presents for evaluation of left ear pain that is been ongoing for the past 3 days. Patient states that she did have a mild cough over the past week but denies any other symptoms. Denies any medications other than Tylenol for the ear pain. She denies any drainage but states that she has had some muffled hearing. She denies any fever or chills. The history is provided by the patient. REVIEW OF SYSTEMS     Review of Systems   Constitutional:  Negative for chills and fever. HENT:  Positive for ear pain. Negative for congestion, ear discharge and sore throat. Respiratory:  Positive for cough. Negative for shortness of breath. Cardiovascular:  Negative for chest pain. Gastrointestinal:  Negative for nausea and vomiting. Musculoskeletal:  Negative for arthralgias and myalgias. Skin:  Negative for rash. Allergic/Immunologic: Negative for immunocompromised state. Neurological:  Negative for headaches. PAST MEDICAL HISTORY         Diagnosis Date    Diabetes mellitus ([address]     diet controlled, gestational with last pregnancy in 2020     Kidney stone 2015    thinks left side and Dr. Desiree Miller blasted it per patient    Kidney stone 12/02/2019    on the left side and 2 cms in size. PONV (postoperative nausea and vomiting)     nausea with kidney stones  patch helps    Postpartum depression     with previous pregnancies       SURGICALHISTORY     Patient  has a past surgical history that includes Tympanostomy tube placement; other surgical history (16 April 2015);  Dilation and curettage of uterus (12/21/15); cysto/uretero/pyeloscopy, calculus tx (Left, 12/2/2019); CYSTOSCOPY INSERTION /

## 2023-07-12 ENCOUNTER — OFFICE VISIT (OUTPATIENT)
Dept: FAMILY MEDICINE CLINIC | Age: 28
End: 2023-07-12
Payer: MEDICAID

## 2023-07-12 VITALS
SYSTOLIC BLOOD PRESSURE: 104 MMHG | HEIGHT: 62 IN | OXYGEN SATURATION: 99 % | HEART RATE: 83 BPM | DIASTOLIC BLOOD PRESSURE: 64 MMHG | RESPIRATION RATE: 10 BRPM | BODY MASS INDEX: 21.9 KG/M2 | WEIGHT: 119 LBS

## 2023-07-12 DIAGNOSIS — F41.9 ANXIETY: ICD-10-CM

## 2023-07-12 DIAGNOSIS — F32.0 MILD MAJOR DEPRESSION (HCC): ICD-10-CM

## 2023-07-12 DIAGNOSIS — G47.9 SLEEP DISTURBANCE: ICD-10-CM

## 2023-07-12 DIAGNOSIS — F43.21 GRIEF: Primary | ICD-10-CM

## 2023-07-12 PROCEDURE — 99214 OFFICE O/P EST MOD 30 MIN: CPT | Performed by: NURSE PRACTITIONER

## 2023-07-12 PROCEDURE — G8420 CALC BMI NORM PARAMETERS: HCPCS | Performed by: NURSE PRACTITIONER

## 2023-07-12 PROCEDURE — G8427 DOCREV CUR MEDS BY ELIG CLIN: HCPCS | Performed by: NURSE PRACTITIONER

## 2023-07-12 PROCEDURE — 1036F TOBACCO NON-USER: CPT | Performed by: NURSE PRACTITIONER

## 2023-07-12 RX ORDER — BUSPIRONE HYDROCHLORIDE 5 MG/1
5 TABLET ORAL 2 TIMES DAILY
Qty: 60 TABLET | Refills: 1 | Status: SHIPPED | OUTPATIENT
Start: 2023-07-12 | End: 2023-09-10

## 2023-07-12 RX ORDER — ALPRAZOLAM 0.25 MG/1
0.25 TABLET ORAL 3 TIMES DAILY PRN
Qty: 90 TABLET | Refills: 0 | Status: SHIPPED | OUTPATIENT
Start: 2023-07-12 | End: 2023-08-11

## 2023-07-12 RX ORDER — ESCITALOPRAM OXALATE 10 MG/1
10 TABLET ORAL DAILY
Qty: 30 TABLET | Refills: 3 | Status: SHIPPED | OUTPATIENT
Start: 2023-07-12

## 2023-07-12 RX ORDER — PANTOPRAZOLE SODIUM 40 MG/1
40 TABLET, DELAYED RELEASE ORAL
Qty: 60 TABLET | Refills: 0 | Status: SHIPPED | OUTPATIENT
Start: 2023-07-12

## 2023-07-12 SDOH — ECONOMIC STABILITY: HOUSING INSECURITY
IN THE LAST 12 MONTHS, WAS THERE A TIME WHEN YOU DID NOT HAVE A STEADY PLACE TO SLEEP OR SLEPT IN A SHELTER (INCLUDING NOW)?: YES

## 2023-07-12 SDOH — ECONOMIC STABILITY: FOOD INSECURITY: WITHIN THE PAST 12 MONTHS, THE FOOD YOU BOUGHT JUST DIDN'T LAST AND YOU DIDN'T HAVE MONEY TO GET MORE.: NEVER TRUE

## 2023-07-12 SDOH — ECONOMIC STABILITY: FOOD INSECURITY: WITHIN THE PAST 12 MONTHS, YOU WORRIED THAT YOUR FOOD WOULD RUN OUT BEFORE YOU GOT MONEY TO BUY MORE.: NEVER TRUE

## 2023-07-12 SDOH — ECONOMIC STABILITY: INCOME INSECURITY: HOW HARD IS IT FOR YOU TO PAY FOR THE VERY BASICS LIKE FOOD, HOUSING, MEDICAL CARE, AND HEATING?: NOT HARD AT ALL

## 2023-07-12 ASSESSMENT — PATIENT HEALTH QUESTIONNAIRE - PHQ9
6. FEELING BAD ABOUT YOURSELF - OR THAT YOU ARE A FAILURE OR HAVE LET YOURSELF OR YOUR FAMILY DOWN: SEVERAL DAYS
8. MOVING OR SPEAKING SO SLOWLY THAT OTHER PEOPLE COULD HAVE NOTICED. OR THE OPPOSITE, BEING SO FIGETY OR RESTLESS THAT YOU HAVE BEEN MOVING AROUND A LOT MORE THAN USUAL: 2
6. FEELING BAD ABOUT YOURSELF - OR THAT YOU ARE A FAILURE OR HAVE LET YOURSELF OR YOUR FAMILY DOWN: 1
10. IF YOU CHECKED OFF ANY PROBLEMS, HOW DIFFICULT HAVE THESE PROBLEMS MADE IT FOR YOU TO DO YOUR WORK, TAKE CARE OF THINGS AT HOME, OR GET ALONG WITH OTHER PEOPLE: SOMEWHAT DIFFICULT
SUM OF ALL RESPONSES TO PHQ QUESTIONS 1-9: 13
SUM OF ALL RESPONSES TO PHQ QUESTIONS 1-9: 13
2. FEELING DOWN, DEPRESSED OR HOPELESS: SEVERAL DAYS
3. TROUBLE FALLING OR STAYING ASLEEP: 1
SUM OF ALL RESPONSES TO PHQ QUESTIONS 1-9: 13
4. FEELING TIRED OR HAVING LITTLE ENERGY: SEVERAL DAYS
7. TROUBLE CONCENTRATING ON THINGS, SUCH AS READING THE NEWSPAPER OR WATCHING TELEVISION: NEARLY EVERY DAY
5. POOR APPETITE OR OVEREATING: 3
2. FEELING DOWN, DEPRESSED OR HOPELESS: 1
10. IF YOU CHECKED OFF ANY PROBLEMS, HOW DIFFICULT HAVE THESE PROBLEMS MADE IT FOR YOU TO DO YOUR WORK, TAKE CARE OF THINGS AT HOME, OR GET ALONG WITH OTHER PEOPLE: 1
1. LITTLE INTEREST OR PLEASURE IN DOING THINGS: SEVERAL DAYS
SUM OF ALL RESPONSES TO PHQ9 QUESTIONS 1 & 2: 2
1. LITTLE INTEREST OR PLEASURE IN DOING THINGS: 1
9. THOUGHTS THAT YOU WOULD BE BETTER OFF DEAD, OR OF HURTING YOURSELF: NOT AT ALL
SUM OF ALL RESPONSES TO PHQ QUESTIONS 1-9: 13
7. TROUBLE CONCENTRATING ON THINGS, SUCH AS READING THE NEWSPAPER OR WATCHING TELEVISION: 3
8. MOVING OR SPEAKING SO SLOWLY THAT OTHER PEOPLE COULD HAVE NOTICED. OR THE OPPOSITE - BEING SO FIDGETY OR RESTLESS THAT YOU HAVE BEEN MOVING AROUND A LOT MORE THAN USUAL: MORE THAN HALF THE DAYS
SUM OF ALL RESPONSES TO PHQ QUESTIONS 1-9: 13
9. THOUGHTS THAT YOU WOULD BE BETTER OFF DEAD, OR OF HURTING YOURSELF: 0
5. POOR APPETITE OR OVEREATING: NEARLY EVERY DAY
3. TROUBLE FALLING OR STAYING ASLEEP: SEVERAL DAYS
4. FEELING TIRED OR HAVING LITTLE ENERGY: 1

## 2023-07-12 ASSESSMENT — ENCOUNTER SYMPTOMS
NAUSEA: 1
CONSTIPATION: 0
ABDOMINAL PAIN: 1
DIARRHEA: 0
COUGH: 0
SHORTNESS OF BREATH: 0
BACK PAIN: 0
ABDOMINAL DISTENTION: 0
BLOOD IN STOOL: 0
VOMITING: 1
CHEST TIGHTNESS: 0
WHEEZING: 0

## 2023-08-02 RX ORDER — PANTOPRAZOLE SODIUM 40 MG/1
TABLET, DELAYED RELEASE ORAL
Qty: 60 TABLET | Refills: 0 | Status: SHIPPED | OUTPATIENT
Start: 2023-08-02

## 2023-11-07 ENCOUNTER — OFFICE VISIT (OUTPATIENT)
Dept: FAMILY MEDICINE CLINIC | Age: 28
End: 2023-11-07
Payer: COMMERCIAL

## 2023-11-07 VITALS
HEART RATE: 88 BPM | RESPIRATION RATE: 16 BRPM | TEMPERATURE: 97.9 F | SYSTOLIC BLOOD PRESSURE: 106 MMHG | OXYGEN SATURATION: 97 % | WEIGHT: 124 LBS | BODY MASS INDEX: 22.68 KG/M2 | DIASTOLIC BLOOD PRESSURE: 58 MMHG

## 2023-11-07 DIAGNOSIS — F41.9 ANXIETY: ICD-10-CM

## 2023-11-07 DIAGNOSIS — G47.9 SLEEP DISTURBANCE: ICD-10-CM

## 2023-11-07 DIAGNOSIS — R63.4 UNINTENTIONAL WEIGHT LOSS: Primary | ICD-10-CM

## 2023-11-07 DIAGNOSIS — R45.1 RESTLESSNESS: ICD-10-CM

## 2023-11-07 DIAGNOSIS — F43.21 GRIEF: ICD-10-CM

## 2023-11-07 PROCEDURE — 1036F TOBACCO NON-USER: CPT | Performed by: FAMILY MEDICINE

## 2023-11-07 PROCEDURE — 99214 OFFICE O/P EST MOD 30 MIN: CPT | Performed by: FAMILY MEDICINE

## 2023-11-07 PROCEDURE — G8427 DOCREV CUR MEDS BY ELIG CLIN: HCPCS | Performed by: FAMILY MEDICINE

## 2023-11-07 PROCEDURE — G8420 CALC BMI NORM PARAMETERS: HCPCS | Performed by: FAMILY MEDICINE

## 2023-11-07 PROCEDURE — G8484 FLU IMMUNIZE NO ADMIN: HCPCS | Performed by: FAMILY MEDICINE

## 2023-11-07 RX ORDER — ESCITALOPRAM OXALATE 10 MG/1
10 TABLET ORAL DAILY
Qty: 90 TABLET | Refills: 1 | Status: SHIPPED | OUTPATIENT
Start: 2023-11-07

## 2023-11-07 RX ORDER — ALPRAZOLAM 0.25 MG/1
0.25 TABLET ORAL 3 TIMES DAILY PRN
Qty: 90 TABLET | Refills: 0 | Status: SHIPPED | OUTPATIENT
Start: 2023-11-07 | End: 2023-12-07

## 2023-11-08 LAB
ABSOLUTE BASO #: 0.05 K/UL (ref 0–0.2)
ABSOLUTE EOS #: 0.12 K/UL (ref 0–0.5)
ABSOLUTE LYMPH #: 1.98 K/UL (ref 1–4)
ABSOLUTE MONO #: 0.39 K/UL (ref 0.2–1)
ABSOLUTE NEUT #: 3.72 K/UL (ref 1.5–7.5)
ALBUMIN SERPL-MCNC: 4.6 G/DL (ref 3.5–5.2)
ALK PHOSPHATASE: 38 U/L (ref 40–112)
ALT SERPL-CCNC: 11 U/L (ref 5–40)
ANION GAP SERPL CALCULATED.3IONS-SCNC: 10 MEQ/L (ref 7–16)
AST SERPL-CCNC: 18 U/L (ref 9–40)
BASOPHILS RELATIVE PERCENT: 0.8 %
BILIRUB SERPL-MCNC: 0.3 MG/DL
BUN BLDV-MCNC: 13 MG/DL (ref 6–20)
CALCIUM SERPL-MCNC: 9.2 MG/DL (ref 8.5–10.5)
CHLORIDE BLD-SCNC: 104 MEQ/L (ref 95–107)
CO2: 24 MEQ/L (ref 19–31)
CREAT SERPL-MCNC: 1.01 MG/DL (ref 0.6–1.3)
EGFR IF NONAFRICAN AMERICAN: 79 ML/MIN/1.73
EOSINOPHILS RELATIVE PERCENT: 1.9 %
GLUCOSE: 91 MG/DL (ref 70–99)
HCT VFR BLD CALC: 44.2 % (ref 34–45)
HEMOGLOBIN: 15 G/DL (ref 11.5–15.5)
LYMPHOCYTE %: 31.5 %
MAGNESIUM: 1.8 MG/DL (ref 1.6–2.6)
MCH RBC QN AUTO: 31.7 PG (ref 25–33)
MCHC RBC AUTO-ENTMCNC: 33.9 G/DL (ref 31–36)
MCV RBC AUTO: 93.4 FL (ref 80–99)
MONOCYTES # BLD: 6.2 %
NEUTROPHILS RELATIVE PERCENT: 59.1 %
PDW BLD-RTO: 12.2 % (ref 11.5–15)
PLATELETS: 258 K/UL (ref 130–400)
PMV BLD AUTO: 10.8 FL (ref 9.3–13)
POTASSIUM SERPL-SCNC: 4.3 MEQ/L (ref 3.5–5.4)
RBC: 4.73 M/UL (ref 3.8–5.4)
SODIUM BLD-SCNC: 138 MEQ/L (ref 133–146)
T4 FREE: 1.2 NG/DL (ref 0.8–1.9)
TOTAL PROTEIN: 6.7 G/DL (ref 6.1–8.3)
TSH SERPL DL<=0.05 MIU/L-ACNC: 0.8 UIU/ML (ref 0.4–4.1)
WBC: 6.3 K/UL (ref 3.5–11)

## 2023-11-12 ASSESSMENT — ENCOUNTER SYMPTOMS
VOMITING: 0
DIARRHEA: 0
COUGH: 0
RHINORRHEA: 0
CHEST TIGHTNESS: 0
ABDOMINAL PAIN: 0
EYES NEGATIVE: 1
SHORTNESS OF BREATH: 0
BACK PAIN: 0
SORE THROAT: 0
NAUSEA: 0

## 2024-01-10 ENCOUNTER — HOSPITAL ENCOUNTER (EMERGENCY)
Age: 29
Discharge: HOME | End: 2024-01-10
Payer: COMMERCIAL

## 2024-01-10 VITALS
DIASTOLIC BLOOD PRESSURE: 85 MMHG | HEIGHT: 61 IN | BODY MASS INDEX: 23.41 KG/M2 | WEIGHT: 124 LBS | SYSTOLIC BLOOD PRESSURE: 118 MMHG | HEART RATE: 103 BPM | RESPIRATION RATE: 18 BRPM | TEMPERATURE: 98.3 F | OXYGEN SATURATION: 97 %

## 2024-01-10 DIAGNOSIS — J06.9 VIRAL URI WITH COUGH: Primary | ICD-10-CM

## 2024-01-10 LAB — SARS-COV-2 RDRP RESP QL NAA+PROBE: NOT  DETECTED

## 2024-01-10 PROCEDURE — 99213 OFFICE O/P EST LOW 20 MIN: CPT

## 2024-01-10 PROCEDURE — 87635 SARS-COV-2 COVID-19 AMP PRB: CPT

## 2024-01-10 RX ORDER — GUAIFENESIN 600 MG/1
600 TABLET, EXTENDED RELEASE ORAL 2 TIMES DAILY PRN
Qty: 30 TABLET | Refills: 0 | Status: SHIPPED | OUTPATIENT
Start: 2024-01-10

## 2024-01-10 RX ORDER — DEXTROMETHORPHAN HYDROBROMIDE AND PROMETHAZINE HYDROCHLORIDE 15; 6.25 MG/5ML; MG/5ML
5 SYRUP ORAL 4 TIMES DAILY PRN
Qty: 120 ML | Refills: 0 | Status: SHIPPED | OUTPATIENT
Start: 2024-01-10 | End: 2024-01-17

## 2024-01-10 RX ORDER — FLUTICASONE PROPIONATE 50 MCG
2 SPRAY, SUSPENSION (ML) NASAL DAILY
Qty: 16 G | Refills: 0 | Status: SHIPPED | OUTPATIENT
Start: 2024-01-10

## 2024-01-10 RX ORDER — PREDNISONE 20 MG/1
20 TABLET ORAL 2 TIMES DAILY
Qty: 10 TABLET | Refills: 0 | Status: SHIPPED | OUTPATIENT
Start: 2024-01-10 | End: 2024-01-15

## 2024-01-10 RX ORDER — BENZONATATE 100 MG/1
100 CAPSULE ORAL 3 TIMES DAILY PRN
Qty: 21 CAPSULE | Refills: 0 | Status: SHIPPED | OUTPATIENT
Start: 2024-01-10 | End: 2024-01-17

## 2024-01-10 ASSESSMENT — ENCOUNTER SYMPTOMS: COUGH: 1

## 2024-01-10 ASSESSMENT — PAIN DESCRIPTION - LOCATION: LOCATION: GENERALIZED

## 2024-01-10 ASSESSMENT — PAIN SCALES - GENERAL: PAINLEVEL_OUTOF10: 6

## 2024-01-10 ASSESSMENT — PAIN DESCRIPTION - DESCRIPTORS: DESCRIPTORS: TIGHTNESS

## 2024-01-10 ASSESSMENT — PAIN - FUNCTIONAL ASSESSMENT: PAIN_FUNCTIONAL_ASSESSMENT: 0-10

## 2024-01-10 NOTE — ED PROVIDER NOTES
[facility] URGENT CARE  Urgent Care Encounter       CHIEF COMPLAINT       Chief Complaint   Patient presents with    Cough    Nasal Congestion    Generalized Body Aches       Nurses Notes reviewed and I agree except as noted in the HPI.  HISTORY OF PRESENT ILLNESS   Milton Gibbons is a 28 y.o. female who presents with complaints of cough, congestion, body aches, and fatigue.  Patient reports that symptoms started on Monday.  Patient reports that she took decongestions without relief.  Patient reports body aches 6/10 at this time which is bothering her the most.    The history is provided by the patient.       REVIEW OF SYSTEMS     Review of Systems   Constitutional:  Positive for fatigue.   HENT:  Positive for congestion.    Respiratory:  Positive for cough.    Musculoskeletal:  Positive for myalgias.   All other systems reviewed and are negative.      PAST MEDICAL HISTORY         Diagnosis Date    Kidney stone 2015    thinks left side and Dr. Suárez blasted it per patient    Kidney stone 12/02/2019    on the left side and 2 cms in size.    PONV (postoperative nausea and vomiting)     nausea with kidney stones  patch helps    Postpartum depression     with previous pregnancies       SURGICALHISTORY     Patient  has a past surgical history that includes Tympanostomy tube placement; other surgical history (16 April 2015); Dilation and curettage of uterus (12/21/15); cysto/uretero/pyeloscopy, calculus tx (Left, 12/2/2019); CYSTOSCOPY INSERTION / REMOVAL STENT / STONE (Left, 1/27/2020); CYSTOSCOPY INSERTION / REMOVAL STENT / STONE (N/A, 3/2/2020); Cystoscopy (Left, 3/21/2020); CYSTOSCOPY INSERTION / REMOVAL STENT / STONE (Left, 3/26/2020); Tonsillectomy (Bilateral); Tubal ligation; Cystoscopy (Left, 12/21/2021); and Ureter surgery (Left, 2/1/2022).    CURRENT MEDICATIONS       Previous Medications    ESCITALOPRAM (LEXAPRO) 10 MG TABLET    Take 1 tablet by mouth daily       ALLERGIES     Patient is has  PREDNISONE (DELTASONE) 20 MG TABLET    Take 1 tablet by mouth 2 times daily for 5 days    PROMETHAZINE-DEXTROMETHORPHAN (PROMETHAZINE-DM) 6.25-15 MG/5ML SYRUP    Take 5 mLs by mouth 4 times daily as needed for Cough       Discontinued Medications    No medications on file       Current Discharge Medication List          LEV Hernandez - CNP    (Please note that portions of this note were completed with a voice recognition program. Efforts were made to edit the dictations but occasionally words are mis-transcribed.)            Janiya Carlton APRN - CNP  01/10/24 0896

## 2024-01-10 NOTE — DISCHARGE INSTR - COC
1995, 1995, 09/17/1998, 08/31/2001    HPV Bivalent (Cervarix) 08/25/2010    Hep A, HAVRIX, VAQTA, (age 12m-18y), IM, 0.5mL 08/25/2010    Hep B, ENGERIX-B, RECOMBIVAX-HB, (age Birth - 19y), IM, 0.5mL 1995, 1997, 09/17/1998    Hib (HbOC) 1995, 1997, 09/17/1998    Influenza Virus Vaccine 10/21/2019    MMR, PRIORIX, M-M-R II, (age 12m+), SC, 0.5mL 09/17/1998, 08/31/2001    Meningococcal ACWY, MENACTRA (MenACWY-D), (age 9m-55y), IM, 0.5mL 08/25/2010    Polio OPV 1995, 1995, 09/17/1998    Poliovirus, IPOL, (age 6w+), SC/IM, 0.5mL 08/31/2001    TDaP, ADACEL (age 10y-64y), BOOSTRIX (age 10y+), IM, 0.5mL 08/25/2010    Varicella, VARIVAX, (age 12m+), SC, 0.5mL 08/25/2010       Active Problems:  Patient Active Problem List   Diagnosis Code    Oligohydramnios O41.00X0    Normocytic anemia D64.9    Cannabis abuse F12.10    Urinary tract infection without hematuria N39.0    Restless legs G25.81    Anxiety F41.9    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    Low serum vitamin B12 E53.8    Nephrolithiasis N20.0    Left flank pain R10.9    Hydronephrosis due to obstruction of ureter N13.1    Ureteral calculus, left N20.1    Controlled type 2 diabetes mellitus without complication, without long-term current use of insulin (HCC) E11.9    Leukocytosis D72.829       Isolation/Infection:   Isolation            No Isolation          Patient Infection Status       None to display                     Nurse Assessment:  Last Vital Signs: /85   Pulse (!) 103   Temp 98.3 °F (36.8 °C) (Oral)   Resp 18   Ht 1.549 m (5' 1\")   Wt 56.2 kg (124 lb)   LMP 12/28/2023 (Approximate)   SpO2 97%   BMI 23.43 kg/m²     Last documented pain score (0-10 scale): Pain Level: 6  Last Weight:   Wt Readings from Last 1 Encounters:   01/10/24 56.2 kg (124 lb)     Mental Status:  {IP PT MENTAL STATUS:20030}    IV Access:  {[facility] IV ACCESS:679669171}    Nursing Mobility/ADLs:  Walking   {[facility] DME  ADLs:817737455}  Transfer  {[facility] DME ADLs:499924512}  Bathing  {[facility] DME ADLs:815916380}  Dressing  {[facility] DME ADLs:127262662}  Toileting  {[facility] DME ADLs:150539853}  Feeding  {[facility] DME ADLs:972087689}  Med Admin  {[facility] DME ADLs:769937231}  Med Delivery   { REANNA MED Delivery:947912626}    Wound Care Documentation and Therapy:  Incision 20 Vagina (Active)   Number of days: 1444       Incision 20 Vagina (Active)   Number of days: 1408        Elimination:  Continence:   Bowel: {YES / NO:}  Bladder: {YES / NO:}  Urinary Catheter: {Urinary Catheter:362351367}   Colostomy/Ileostomy/Ileal Conduit: {YES / NO:}       Date of Last BM: ***  No intake or output data in the 24 hours ending 01/10/24 0844  No intake/output data recorded.    Safety Concerns:     { REANNA Safety Concerns:684746811}    Impairments/Disabilities:      {[facility] Impairments/Disabilities:221266668}    Nutrition Therapy:  Current Nutrition Therapy:   { REANNA Diet List:451294603}    Routes of Feeding: {[facility] DME Other Feedings:957404816}  Liquids: {Slp liquid thickness:90523}  Daily Fluid Restriction: {P DME Yes amt example:934613481}  Last Modified Barium Swallow with Video (Video Swallowing Test): {Done Not Done Date:}    Treatments at the Time of Hospital Discharge:   Respiratory Treatments: ***  Oxygen Therapy:  {Therapy; copd oxygen:90104}  Ventilator:    { CC Vent List:809224021}    Rehab Therapies: {THERAPEUTIC INTERVENTION:7644000817}  Weight Bearing Status/Restrictions: {[facility] Weight Bearin}  Other Medical Equipment (for information only, NOT a DME order):  {EQUIPMENT:573493526}  Other Treatments: ***    Patient's personal belongings (please select all that are sent with patient):  {[facility] DME Belongings:301614356}    RN SIGNATURE:  {Esignature:542319669}    CASE MANAGEMENT/SOCIAL WORK SECTION    Inpatient Status Date: ***    Readmission Risk Assessment Score:  Readmission Risk              Risk of Unplanned

## 2024-01-10 NOTE — ED NOTES
Pt with complaints of a cough, nasal congestion and body aches that started on Monday. Denies being around anyone ill. States she has tried decongestants but they have not helped. States she felt like this with Bronchitis last.     Philip Robert, DIOR  01/10/24 8687

## 2024-04-15 ENCOUNTER — OFFICE VISIT (OUTPATIENT)
Dept: PSYCHOLOGY | Age: 29
End: 2024-04-15
Payer: COMMERCIAL

## 2024-04-15 DIAGNOSIS — F41.9 ANXIETY DISORDER, UNSPECIFIED TYPE: ICD-10-CM

## 2024-04-15 DIAGNOSIS — F43.21 GRIEF: Primary | ICD-10-CM

## 2024-04-15 PROCEDURE — 1036F TOBACCO NON-USER: CPT | Performed by: PSYCHOLOGIST

## 2024-04-15 PROCEDURE — 90791 PSYCH DIAGNOSTIC EVALUATION: CPT | Performed by: PSYCHOLOGIST

## 2024-04-15 NOTE — PATIENT INSTRUCTIONS
normal reaction to loss. Crying doesn’t mean you are weak. You don’t need to “protect” your family or friends by putting on a brave front. Showing your true feelings can help them and you.  MYTH: If you don’t cry, it means you aren’t sorry about the loss.   Fact: Crying is a normal response to sadness, but it’s not the only one. Those who don’t cry may feel the pain just as deeply as others. They may simply have other ways of showing it.  MYTH: Grief should last about a year.   Fact: There is no right or wrong time frame for grieving. How long it takes can differ from person to person.  Source: [facility]         References  Jasmin,  VERÓNICA, Van Claudio T, KAYLEEN Charles, Fabien HC, & SHYANN Grijalva.  (2005).  Does widowhood affect memory performance of older persons?  Psychological Medicine, 35(2), 217-226.  NICKI Rodríguez, Marta TM, & ROMA Russo.  (2005).  Health behaviors associated with better quality of life for older bereaved persons.  Journal of Palliative Medicine, 8(1), .  GABRIELE Rodriguez  (2004).  Working with grieving adults.  Advances in Psychiatric Treatment, 10, 164-170.  Marcy, PT, SCOTT Mackay, MEÑO Ewing, & EDMOND Parikh.  (2004).  Life after death: Greif therapy after the suddent traumatic death of a family member.  Perspectives in Psychiatric Care, 40(4), 149-154.  RACHEL Ware, MAURICE Coleman, & ARMAND Elliott.  (2000).  Bereavement services in acute care settings.  Death Studies, 24, 51-64.  Yesica, S, & Rafaela H.  (2000).  Psychotherapy of traumatic grief:  A review of evidence for psychotherapeutic treatment.  Death Studies, 24, 479-495.  HOLLIS Pedroza.  (2004).  Connections between counseling theories and current theories of grief and mourning.  Journal of Mental Health Counseling, 26(2), 125-145.      Grief Tips - Help for Those Who Mourn    The following are many ideas to help people who are mourning a loved one’s death.  Different kinds of losses dictate different responses, so not
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
## 2024-05-07 ENCOUNTER — TELEMEDICINE (OUTPATIENT)
Dept: PSYCHOLOGY | Age: 29
End: 2024-05-07
Payer: COMMERCIAL

## 2024-05-07 DIAGNOSIS — F43.21 GRIEF: Primary | ICD-10-CM

## 2024-05-07 DIAGNOSIS — F41.9 ANXIETY DISORDER, UNSPECIFIED TYPE: ICD-10-CM

## 2024-05-07 PROCEDURE — 90832 PSYTX W PT 30 MINUTES: CPT | Performed by: PSYCHOLOGIST
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
## 2024-05-26 ENCOUNTER — APPOINTMENT (OUTPATIENT)
Dept: GENERAL RADIOLOGY | Age: 29
End: 2024-05-26
Payer: COMMERCIAL

## 2024-05-26 ENCOUNTER — HOSPITAL ENCOUNTER (EMERGENCY)
Age: 29
Discharge: HOME | End: 2024-05-26
Payer: COMMERCIAL

## 2024-05-26 VITALS
SYSTOLIC BLOOD PRESSURE: 104 MMHG | OXYGEN SATURATION: 99 % | DIASTOLIC BLOOD PRESSURE: 70 MMHG | RESPIRATION RATE: 18 BRPM | TEMPERATURE: 98.6 F | HEART RATE: 72 BPM

## 2024-05-26 DIAGNOSIS — S90.01XA CONTUSION OF RIGHT ANKLE, INITIAL ENCOUNTER: Primary | ICD-10-CM

## 2024-05-26 PROCEDURE — 99213 OFFICE O/P EST LOW 20 MIN: CPT

## 2024-05-26 PROCEDURE — 73610 X-RAY EXAM OF ANKLE: CPT

## 2024-05-26 ASSESSMENT — PAIN SCALES - GENERAL: PAINLEVEL_OUTOF10: 8

## 2024-05-26 ASSESSMENT — PAIN - FUNCTIONAL ASSESSMENT: PAIN_FUNCTIONAL_ASSESSMENT: 0-10

## 2024-05-26 ASSESSMENT — PAIN DESCRIPTION - ORIENTATION: ORIENTATION: RIGHT

## 2024-05-26 ASSESSMENT — PAIN DESCRIPTION - LOCATION: LOCATION: ANKLE

## 2024-05-26 NOTE — ED PROVIDER NOTES
[facility] URGENT CARE  Urgent Care Encounter       CHIEF COMPLAINT       Chief Complaint   Patient presents with    Ankle Pain       Nurses Notes reviewed and I agree except as noted in the HPI.  HISTORY OF PRESENT ILLNESS   Milton Gibbons is a 29 y.o. female who presents right ankle pain.  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  Symptoms started today.    The history is provided by the patient. No  was used.       REVIEW OF SYSTEMS     Review of Systems   Constitutional: Negative.    HENT: Negative.     Respiratory: Negative.     Cardiovascular: Negative.    Gastrointestinal: Negative.    Musculoskeletal:  Positive for arthralgias (right ankle).   Skin: Negative.    Neurological: Negative.    Psychiatric/Behavioral: Negative.         PAST MEDICAL HISTORY         Diagnosis Date    Kidney stone 2015    thinks left side and Dr. Suárez blasted it per patient    Kidney stone 12/02/2019    on the left side and 2 cms in size.    PONV (postoperative nausea and vomiting)     nausea with kidney stones  patch helps    Postpartum depression     with previous pregnancies       SURGICALHISTORY     Patient  has a past surgical history that includes Tympanostomy tube placement; other surgical history (16 April 2015); Dilation and curettage of uterus (12/21/15); cysto/uretero/pyeloscopy, calculus tx (Left, 12/2/2019); CYSTOSCOPY INSERTION / REMOVAL STENT / STONE (Left, 1/27/2020); CYSTOSCOPY INSERTION / REMOVAL STENT / STONE (N/A, 3/2/2020); Cystoscopy (Left, 3/21/2020); CYSTOSCOPY INSERTION / REMOVAL STENT / STONE (Left, 3/26/2020); Tonsillectomy (Bilateral); Tubal ligation; Cystoscopy (Left, 12/21/2021); and Ureter surgery (Left, 2/1/2022).    CURRENT MEDICATIONS       Discharge Medication List as of 5/26/2024  4:54 PM        CONTINUE these medications which have NOT CHANGED    Details   escitalopram (LEXAPRO) 10 MG  tablet Take 1 tablet by mouth daily, Disp-90 tablet, R-1Normal             ALLERGIES     Patient is has No Known Allergies.    Patients   Immunization History   Administered Date(s) Administered    DTaP vaccine 1997, 1997, 09/17/1998, 08/31/2001    HPV Bivalent (Cervarix) 08/25/2010    Hep A, HAVRIX, VAQTA, (age 12m-18y), IM, 0.5mL 08/25/2010    Hep B, ENGERIX-B, RECOMBIVAX-HB, (age Birth - 19y), IM, 0.5mL 1997, 1997, 09/17/1998    Hib (HbOC) 1997, 1997, 09/17/1998    Influenza Virus Vaccine 10/21/2019    MMR, PRIORIX, M-M-R II, (age 12m+), SC, 0.5mL 09/17/1998, 08/31/2001    Meningococcal ACWY, MENACTRA (MenACWY-D), (age 9m-55y), IM, 0.5mL 08/25/2010    Polio OPV 1997, 1997, 09/17/1998    Poliovirus, IPOL, (age 6w+), SC/IM, 0.5mL 08/31/2001    TDaP, ADACEL (age 10y-64y), BOOSTRIX (age 10y+), IM, 0.5mL 08/25/2010    Varicella, VARIVAX, (age 12m+), SC, 0.5mL 08/25/2010       FAMILY HISTORY     Patient's family history includes Diabetes in her father; Heart Disease in her father and paternal grandfather; High Blood Pressure in her father.    SOCIAL HISTORY     Patient  reports that she quit smoking about 4 years ago. Her smoking use included cigarettes. She started smoking about 8 years ago. She has never used smokeless tobacco. She reports that she does not currently use alcohol. She reports that she does not currently use drugs after having used the following drugs: Marijuana (Weed). Frequency: 2.00 times per week.    PHYSICAL EXAM     ED TRIAGE VITALS  BP: 104/70, Temp: 98.6 °F (37 °C), Pulse: 72, Respirations: 18, SpO2: 99 %,Estimated body mass index is 23.43 kg/m² as calculated from the following:    Height as of 1/10/24: 1.549 m (5' 1\").    Weight as of 1/10/24: 56.2 kg (124 lb).,Patient's last menstrual period was 05/03/2024.    Physical Exam  Vitals and nursing note reviewed.   Constitutional:       General: She is not in acute distress.     Appearance: Normal

## 2024-05-26 NOTE — ED TRIAGE NOTES
Around 1030 this morning at work approached a bed where a bed rail was sticking out and hit right ankle on, continues to hurt and move foot/ankle

## 2024-08-19 ENCOUNTER — HOSPITAL ENCOUNTER (EMERGENCY)
Age: 29
Discharge: HOME | End: 2024-08-19
Payer: COMMERCIAL

## 2024-08-19 VITALS
SYSTOLIC BLOOD PRESSURE: 108 MMHG | TEMPERATURE: 98.7 F | RESPIRATION RATE: 16 BRPM | OXYGEN SATURATION: 98 % | DIASTOLIC BLOOD PRESSURE: 70 MMHG | HEART RATE: 81 BPM | WEIGHT: 131 LBS | BODY MASS INDEX: 24.75 KG/M2

## 2024-08-19 DIAGNOSIS — L50.9 URTICARIA: Primary | ICD-10-CM

## 2024-08-19 PROCEDURE — 99213 OFFICE O/P EST LOW 20 MIN: CPT | Performed by: NURSE PRACTITIONER

## 2024-08-19 PROCEDURE — 99213 OFFICE O/P EST LOW 20 MIN: CPT

## 2024-08-19 RX ORDER — PREDNISONE 20 MG/1
20 TABLET ORAL DAILY
Qty: 5 TABLET | Refills: 0 | Status: SHIPPED | OUTPATIENT
Start: 2024-08-19 | End: 2024-08-24

## 2024-08-19 ASSESSMENT — ENCOUNTER SYMPTOMS
SHORTNESS OF BREATH: 0
CHEST TIGHTNESS: 0
DIARRHEA: 0
VOMITING: 0
SORE THROAT: 0
RHINORRHEA: 0
NAUSEA: 0
COUGH: 0

## 2024-08-19 ASSESSMENT — PAIN DESCRIPTION - DESCRIPTORS: DESCRIPTORS: ITCHING

## 2024-08-19 ASSESSMENT — PAIN - FUNCTIONAL ASSESSMENT: PAIN_FUNCTIONAL_ASSESSMENT: 0-10

## 2024-08-19 ASSESSMENT — PAIN SCALES - GENERAL: PAINLEVEL_OUTOF10: 3

## 2024-08-19 ASSESSMENT — PAIN DESCRIPTION - PAIN TYPE: TYPE: ACUTE PAIN

## 2024-08-19 NOTE — ED NOTES
PT GIVEN DISCHARGE INSTRUCTIONS, VERBALIZES UNDERSTANDING.  PT ASSESSMENT UNCHANGED, DISCHARGED IN STABLE CONDITION.        Attila Lezama RN  08/19/24 [id number]

## 2024-08-19 NOTE — ED NOTES
Pt to [facility] with c/o urticaria on arms, stomach and thighs. Pt reports having these symptoms x1 year ago, dx with \"stress hives\" and told to take ASA 81mg. Pt states \"they went away. But this morning, they looked like I had chicken pox, without them being chicken pox.\" No other concerns noted. Denies any new detergents, soaps, or any other possible factors.      Cherelle Callahan, RN  08/19/24 [id number]

## 2024-08-19 NOTE — ED PROVIDER NOTES
[facility] URGENT CARE  Urgent Care Encounter       CHIEF COMPLAINT       Chief Complaint   Patient presents with    Urticaria       Nurses Notes reviewed and I agree except as noted in the HPI.  HISTORY OF PRESENT ILLNESS   Milton Gibbons is a 29 y.o. female who presents to the [place] urgent care for evaluation of rash.  Patient reports over the last several days she has developed urticaria.  She reports that the rash normally last for roughly 1 hour, but then resolves.  She reports she was at work when the urticaria returned.  She reports that she had to leave work due to the symptoms.  At this time there is no rash noted.    The history is provided by the patient. No  was used.       REVIEW OF SYSTEMS     Review of Systems   Constitutional:  Negative for activity change, appetite change, chills, fatigue and fever.   HENT:  Negative for ear discharge, ear pain, rhinorrhea and sore throat.    Respiratory:  Negative for cough, chest tightness and shortness of breath.    Cardiovascular:  Negative for chest pain.   Gastrointestinal:  Negative for diarrhea, nausea and vomiting.   Genitourinary:  Negative for dysuria.   Skin:  Positive for rash.   Allergic/Immunologic: Negative for environmental allergies and food allergies.   Neurological:  Negative for dizziness and headaches.       PAST MEDICAL HISTORY         Diagnosis Date    Kidney stone 2015    thinks left side and Dr. Suárez blasted it per patient    Kidney stone 12/02/2019    on the left side and 2 cms in size.    PONV (postoperative nausea and vomiting)     nausea with kidney stones  patch helps    Postpartum depression     with previous pregnancies       SURGICALHISTORY     Patient  has a past surgical history that includes Tympanostomy tube placement; other surgical history (16 April 2015); Dilation and curettage of uterus (12/21/15); cysto/uretero/pyeloscopy, calculus tx (Left, 12/2/2019); CYSTOSCOPY INSERTION /

## 2025-07-21 ENCOUNTER — APPOINTMENT (OUTPATIENT)
Dept: GENERAL RADIOLOGY | Age: 30
End: 2025-07-21
Payer: COMMERCIAL

## 2025-07-21 ENCOUNTER — HOSPITAL ENCOUNTER (EMERGENCY)
Age: 30
Discharge: HOME | End: 2025-07-21
Payer: COMMERCIAL

## 2025-07-21 VITALS
WEIGHT: 152 LBS | SYSTOLIC BLOOD PRESSURE: 111 MMHG | BODY MASS INDEX: 28.72 KG/M2 | RESPIRATION RATE: 16 BRPM | TEMPERATURE: 98.6 F | DIASTOLIC BLOOD PRESSURE: 72 MMHG | OXYGEN SATURATION: 100 % | HEART RATE: 65 BPM

## 2025-07-21 DIAGNOSIS — S93.401A SPRAIN OF RIGHT ANKLE, UNSPECIFIED LIGAMENT, INITIAL ENCOUNTER: Primary | ICD-10-CM

## 2025-07-21 PROCEDURE — 99213 OFFICE O/P EST LOW 20 MIN: CPT

## 2025-07-21 PROCEDURE — 73610 X-RAY EXAM OF ANKLE: CPT

## 2025-07-21 ASSESSMENT — PAIN DESCRIPTION - FREQUENCY: FREQUENCY: INTERMITTENT

## 2025-07-21 ASSESSMENT — PAIN - FUNCTIONAL ASSESSMENT
PAIN_FUNCTIONAL_ASSESSMENT: ACTIVITIES ARE NOT PREVENTED
PAIN_FUNCTIONAL_ASSESSMENT: 0-10

## 2025-07-21 ASSESSMENT — PAIN SCALES - GENERAL: PAINLEVEL_OUTOF10: 6

## 2025-07-21 ASSESSMENT — PAIN DESCRIPTION - ORIENTATION: ORIENTATION: RIGHT

## 2025-07-21 ASSESSMENT — PAIN DESCRIPTION - LOCATION: LOCATION: ANKLE

## 2025-07-21 NOTE — ED PROVIDER NOTES
[facility] URGENT CARE  Urgent Care Encounter      CHIEF COMPLAINT       Chief Complaint   Patient presents with    Ankle Injury       Nurses Notes reviewed and I agree except as noted in the HPI.  HISTORY OF PRESENT ILLNESS   Milton Gibbons is a 30 y.o. female who presents to urgent care with complaints of right ankle pain.  Patient reports she rolled her ankle approximately 2 weeks ago.  Reports since that she has continued having ankle pain and swelling.  Patient denies numbness, tingling, loss sensation.  Patient reports she has been taking Tylenol without relief.    REVIEW OF SYSTEMS     Review of Systems   Musculoskeletal:  Positive for gait problem and joint swelling.       PAST MEDICAL HISTORY         Diagnosis Date    Kidney stone 2015    thinks left side and Dr. Suárez blasted it per patient    Kidney stone 12/02/2019    on the left side and 2 cms in size.    PONV (postoperative nausea and vomiting)     nausea with kidney stones  patch helps    Postpartum depression     with previous pregnancies       SURGICAL HISTORY     Patient  has a past surgical history that includes Tympanostomy tube placement; other surgical history (16 April 2015); Dilation and curettage of uterus (12/21/15); cysto/uretero/pyeloscopy, calculus tx (Left, 12/2/2019); CYSTOSCOPY INSERTION / REMOVAL STENT / STONE (Left, 1/27/2020); CYSTOSCOPY INSERTION / REMOVAL STENT / STONE (N/A, 3/2/2020); Cystoscopy (Left, 3/21/2020); CYSTOSCOPY INSERTION / REMOVAL STENT / STONE (Left, 3/26/2020); Tonsillectomy (Bilateral); Tubal ligation; Cystoscopy (Left, 12/21/2021); and Ureter surgery (Left, 2/1/2022).    CURRENT MEDICATIONS       Discharge Medication List as of 7/21/2025  8:49 AM          ALLERGIES     Patient is has no known allergies.    FAMILY HISTORY     Patient'sfamily history includes Diabetes in her father; Heart Disease in her father and paternal grandfather; High Blood Pressure in her father.    SOCIAL HISTORY     Patient

## 2025-07-21 NOTE — ED TRIAGE NOTES
Milton arrives to room with complaint of fall 2 weeks ago injuring right ankle.  Popping, painful, giving out on pt off and on     Taking tylenol     Work note

## 2025-08-17 ENCOUNTER — HOSPITAL ENCOUNTER (EMERGENCY)
Age: 30
Discharge: HOME | End: 2025-08-17
Payer: COMMERCIAL

## 2025-08-17 VITALS
HEART RATE: 86 BPM | OXYGEN SATURATION: 98 % | WEIGHT: 144.8 LBS | BODY MASS INDEX: 27.36 KG/M2 | TEMPERATURE: 98.6 F | RESPIRATION RATE: 16 BRPM | DIASTOLIC BLOOD PRESSURE: 77 MMHG | SYSTOLIC BLOOD PRESSURE: 115 MMHG

## 2025-08-17 DIAGNOSIS — J06.9 VIRAL URI WITH COUGH: Primary | ICD-10-CM

## 2025-08-17 LAB — S PYO AG THROAT QL: NEGATIVE

## 2025-08-17 PROCEDURE — 99213 OFFICE O/P EST LOW 20 MIN: CPT

## 2025-08-17 PROCEDURE — 87651 STREP A DNA AMP PROBE: CPT

## 2025-08-17 PROCEDURE — 99212 OFFICE O/P EST SF 10 MIN: CPT | Performed by: NURSE PRACTITIONER

## 2025-08-17 ASSESSMENT — PAIN DESCRIPTION - LOCATION: LOCATION: THROAT

## 2025-08-17 ASSESSMENT — PAIN - FUNCTIONAL ASSESSMENT
PAIN_FUNCTIONAL_ASSESSMENT: 0-10
PAIN_FUNCTIONAL_ASSESSMENT: ACTIVITIES ARE NOT PREVENTED

## 2025-08-17 ASSESSMENT — PAIN DESCRIPTION - FREQUENCY: FREQUENCY: CONTINUOUS

## 2025-08-17 ASSESSMENT — PAIN SCALES - GENERAL: PAINLEVEL_OUTOF10: 4

## (undated) DEVICE — Z INACTIVE USE 2660664 SOLUTION IRRIG 3000ML 0.9% SOD CHL USP UROMATIC PLAS CONT

## (undated) DEVICE — URETERAL STENT
Type: IMPLANTABLE DEVICE | Status: NON-FUNCTIONAL
Brand: PERCUFLEX™ PLUS

## (undated) DEVICE — BAG DRNGE (SEE COMMENT) UROLOGY TBL 15.5X31 IN W/HOSE

## (undated) DEVICE — SUTURE VCRL SZ 3-0 L27IN ABSRB UD FS-2 L19MM 1/2 CIR J423H

## (undated) DEVICE — SOLUTION IRRIG 3000ML STRL H2O USP UROMATIC PLAS CONT

## (undated) DEVICE — 1010 S-DRAPE TOWEL DRAPE 10/BX: Brand: STERI-DRAPE™

## (undated) DEVICE — GARMENT,MEDLINE,DVT,INT,CALF,MED, GEN2: Brand: MEDLINE

## (undated) DEVICE — SOLUTION IV IRRIG WATER 1000ML POUR BRL 2F7114

## (undated) DEVICE — SPONGE GZ W4XL4IN COT 12 PLY TYP VII WVN C FLD DSGN

## (undated) DEVICE — SOLUTION IV 1000ML 0.9% SOD CHL PH 5 INJ USP VIAFLX PLAS

## (undated) DEVICE — DUAL LUMEN URETERAL CATHETER

## (undated) DEVICE — ADAPTER URO SCP UROLOK LL

## (undated) DEVICE — GOWN,AURORA,NON-REINFORCED,2XL: Brand: MEDLINE

## (undated) DEVICE — GLOVE SURG SZ 65 THK91MIL LTX FREE SYN POLYISOPRENE

## (undated) DEVICE — GLOVE ORANGE PI 7   MSG9070

## (undated) DEVICE — Device

## (undated) DEVICE — SINGLE ACTION PUMPING SYSTEM

## (undated) DEVICE — Y-TYPE TUR/BLADDER IRRIGATION SET, REGULATING CLAMP

## (undated) DEVICE — GLOVE ORANGE PI 7 1/2   MSG9075

## (undated) DEVICE — SYSTEM PMP VAC SYR 10CC CONT FLO SGL ACT 1 W VLV SAPS

## (undated) DEVICE — GOWN,SIRUS,NON REINFRCD,LARGE,SET IN SL: Brand: MEDLINE

## (undated) DEVICE — CYSTO PACK: Brand: MEDLINE INDUSTRIES, INC.

## (undated) DEVICE — GLOVE ORANGE PI 8 1/2   MSG9085

## (undated) DEVICE — GUIDEWIRE URO L150CM DIA0.035IN STIFF NIT HYDRPHLC STR TIP

## (undated) DEVICE — Z DISCONTINUED USE 2272117 DRAPE SURG 3 QTR N INVASIVE 2 LAYR DISP

## (undated) DEVICE — SOLUTION SCRB 4OZ 4% CHG H2O AIDED FOR PREOPERATIVE SKIN

## (undated) DEVICE — Z DISCONTINUED USE 2139346 GUIDEWIRE KAYAK URO STIFF 0.035 IN

## (undated) DEVICE — PLUG,CATHETER,DRAINAGE PROTECTOR,TUBE: Brand: MEDLINE

## (undated) DEVICE — SINGLE-USE DIGITAL FLEXIBLE URETEROSCOPE: Brand: LITHOVUE

## (undated) DEVICE — SOLUTION IRRIGATION STRL H2O 1000 ML UROMATIC CONTAINER